# Patient Record
Sex: FEMALE | Race: WHITE | HISPANIC OR LATINO | Employment: OTHER | URBAN - METROPOLITAN AREA
[De-identification: names, ages, dates, MRNs, and addresses within clinical notes are randomized per-mention and may not be internally consistent; named-entity substitution may affect disease eponyms.]

---

## 2023-02-10 PROBLEM — R26.89 DECREASED MOBILITY: Status: ACTIVE | Noted: 2023-02-10

## 2023-02-10 PROBLEM — M54.50 LOWER BACK PAIN: Status: ACTIVE | Noted: 2023-02-10

## 2023-02-10 PROBLEM — M79.605 LEFT LEG PAIN: Status: ACTIVE | Noted: 2023-02-10

## 2023-02-10 PROBLEM — E78.00 HYPERCHOLESTEREMIA: Status: ACTIVE | Noted: 2023-02-10

## 2023-02-10 PROBLEM — L03.90 CELLULITIS: Status: ACTIVE | Noted: 2023-02-10

## 2023-02-10 PROBLEM — H52.03 HYPERMETROPIA OF BOTH EYES: Status: ACTIVE | Noted: 2023-02-10

## 2023-02-10 PROBLEM — N63.10 BREAST MASS, RIGHT: Status: ACTIVE | Noted: 2023-02-10

## 2023-02-10 PROBLEM — N18.30 CKD (CHRONIC KIDNEY DISEASE), STAGE III (MULTI): Status: ACTIVE | Noted: 2023-02-10

## 2023-02-10 PROBLEM — D69.6 THROMBOCYTOPENIA (CMS-HCC): Status: ACTIVE | Noted: 2023-02-10

## 2023-02-10 PROBLEM — E66.01 OBESITY, CLASS III, BMI 40-49.9 (MORBID OBESITY) (MULTI): Status: ACTIVE | Noted: 2023-02-10

## 2023-02-10 PROBLEM — M20.12 HAV (HALLUX ABDUCTO VALGUS), LEFT: Status: ACTIVE | Noted: 2023-02-10

## 2023-02-10 PROBLEM — H52.203 ASTIGMATISM, BILATERAL: Status: ACTIVE | Noted: 2023-02-10

## 2023-02-10 PROBLEM — I83.11 VARICOSE VEINS OF BOTH LOWER EXTREMITIES WITH INFLAMMATION: Status: ACTIVE | Noted: 2023-02-10

## 2023-02-10 PROBLEM — E11.9 DM2 (DIABETES MELLITUS, TYPE 2) (MULTI): Status: ACTIVE | Noted: 2023-02-10

## 2023-02-10 PROBLEM — J30.2 SEASONAL ALLERGIES: Status: ACTIVE | Noted: 2023-02-10

## 2023-02-10 PROBLEM — I87.303 CHRONIC VENOUS HYPERTENSION INVOLVING BOTH SIDES: Status: ACTIVE | Noted: 2023-02-10

## 2023-02-10 PROBLEM — J44.9 COPD (CHRONIC OBSTRUCTIVE PULMONARY DISEASE) (MULTI): Status: ACTIVE | Noted: 2023-02-10

## 2023-02-10 PROBLEM — I83.12 VARICOSE VEINS OF BOTH LOWER EXTREMITIES WITH INFLAMMATION: Status: ACTIVE | Noted: 2023-02-10

## 2023-02-10 PROBLEM — M79.604 BILATERAL LEG PAIN: Status: ACTIVE | Noted: 2023-02-10

## 2023-02-10 PROBLEM — E27.8 LEFT ADRENAL MASS (MULTI): Status: ACTIVE | Noted: 2023-02-10

## 2023-02-10 PROBLEM — R13.10 DIFFICULTY SWALLOWING: Status: ACTIVE | Noted: 2023-02-10

## 2023-02-10 PROBLEM — C64.1 RENAL CELL CARCINOMA OF RIGHT KIDNEY (MULTI): Status: ACTIVE | Noted: 2023-02-10

## 2023-02-10 PROBLEM — F41.9 ANXIETY: Status: ACTIVE | Noted: 2023-02-10

## 2023-02-10 PROBLEM — L02.419 AXILLARY ABSCESS: Status: ACTIVE | Noted: 2023-02-10

## 2023-02-10 PROBLEM — I87.2 CHRONIC VENOUS STASIS DERMATITIS OF BOTH LOWER EXTREMITIES: Status: ACTIVE | Noted: 2023-02-10

## 2023-02-10 PROBLEM — H43.813 PVD (POSTERIOR VITREOUS DETACHMENT), BOTH EYES: Status: ACTIVE | Noted: 2023-02-10

## 2023-02-10 PROBLEM — N25.81 HYPERPARATHYROIDISM, SECONDARY (MULTI): Status: ACTIVE | Noted: 2023-02-10

## 2023-02-10 PROBLEM — K76.9 LIVER LESION: Status: ACTIVE | Noted: 2023-02-10

## 2023-02-10 PROBLEM — E11.41 DIABETIC MONONEUROPATHY ASSOCIATED WITH TYPE 2 DIABETES MELLITUS (MULTI): Status: ACTIVE | Noted: 2023-02-10

## 2023-02-10 PROBLEM — G89.29 CHRONIC NONINTRACTABLE HEADACHE: Status: ACTIVE | Noted: 2023-02-10

## 2023-02-10 PROBLEM — M79.672 FOOT PAIN, BILATERAL: Status: ACTIVE | Noted: 2023-02-10

## 2023-02-10 PROBLEM — L95.9 VASCULITIS OF SKIN: Status: ACTIVE | Noted: 2023-02-10

## 2023-02-10 PROBLEM — M25.532 LEFT WRIST PAIN: Status: ACTIVE | Noted: 2023-02-10

## 2023-02-10 PROBLEM — J45.909 ASTHMA (HHS-HCC): Status: ACTIVE | Noted: 2023-02-10

## 2023-02-10 PROBLEM — H91.90 HEARING LOSS: Status: ACTIVE | Noted: 2023-02-10

## 2023-02-10 PROBLEM — E03.9 HYPOTHYROIDISM, ADULT: Status: ACTIVE | Noted: 2023-02-10

## 2023-02-10 PROBLEM — L60.0 ONYCHOCRYPTOSIS: Status: ACTIVE | Noted: 2023-02-10

## 2023-02-10 PROBLEM — F13.20 BENZODIAZEPINE DEPENDENCE (MULTI): Status: ACTIVE | Noted: 2023-02-10

## 2023-02-10 PROBLEM — I87.8 CHRONIC VENOUS STASIS: Status: ACTIVE | Noted: 2023-02-10

## 2023-02-10 PROBLEM — R21 RASH: Status: ACTIVE | Noted: 2023-02-10

## 2023-02-10 PROBLEM — R54 FRAIL ELDERLY: Status: ACTIVE | Noted: 2023-02-10

## 2023-02-10 PROBLEM — H26.493 PCO (POSTERIOR CAPSULAR OPACIFICATION), BILATERAL: Status: ACTIVE | Noted: 2023-02-10

## 2023-02-10 PROBLEM — M25.562 LEFT KNEE PAIN: Status: ACTIVE | Noted: 2023-02-10

## 2023-02-10 PROBLEM — L03.317 CELLULITIS OF BUTTOCK: Status: ACTIVE | Noted: 2023-02-10

## 2023-02-10 PROBLEM — R51.9 CHRONIC NONINTRACTABLE HEADACHE: Status: ACTIVE | Noted: 2023-02-10

## 2023-02-10 PROBLEM — R06.02 SHORTNESS OF BREATH: Status: ACTIVE | Noted: 2023-02-10

## 2023-02-10 PROBLEM — H34.8120 CENTRAL RETINAL VEIN OCCLUSION WITH MACULAR EDEMA OF LEFT EYE (CMS-HCC): Status: ACTIVE | Noted: 2023-02-10

## 2023-02-10 PROBLEM — R60.0 BILATERAL EDEMA OF LOWER EXTREMITY: Status: ACTIVE | Noted: 2023-02-10

## 2023-02-10 PROBLEM — R23.4 BREAST SKIN CHANGES: Status: ACTIVE | Noted: 2023-02-10

## 2023-02-10 PROBLEM — I95.9 HYPOTENSION: Status: ACTIVE | Noted: 2023-02-10

## 2023-02-10 PROBLEM — E11.9 DIABETES MELLITUS (MULTI): Status: ACTIVE | Noted: 2023-02-10

## 2023-02-10 PROBLEM — E04.1 THYROID NODULE: Status: ACTIVE | Noted: 2023-02-10

## 2023-02-10 PROBLEM — G62.9 PERIPHERAL NEUROPATHY: Status: ACTIVE | Noted: 2023-02-10

## 2023-02-10 PROBLEM — I77.6 VASCULITIS (CMS-HCC): Status: ACTIVE | Noted: 2023-02-10

## 2023-02-10 PROBLEM — Z96.1 PSEUDOPHAKIA OF BOTH EYES: Status: ACTIVE | Noted: 2023-02-10

## 2023-02-10 PROBLEM — K21.9 GERD (GASTROESOPHAGEAL REFLUX DISEASE): Status: ACTIVE | Noted: 2023-02-10

## 2023-02-10 PROBLEM — H52.4 BILATERAL PRESBYOPIA: Status: ACTIVE | Noted: 2023-02-10

## 2023-02-10 PROBLEM — C50.911 BREAST CANCER, RIGHT (MULTI): Status: ACTIVE | Noted: 2023-02-10

## 2023-02-10 PROBLEM — R23.3 PETECHIAL RASH: Status: ACTIVE | Noted: 2023-02-10

## 2023-02-10 PROBLEM — M79.671 FOOT PAIN, BILATERAL: Status: ACTIVE | Noted: 2023-02-10

## 2023-02-10 PROBLEM — M17.9 OSTEOARTHRITIS OF KNEE: Status: ACTIVE | Noted: 2023-02-10

## 2023-02-10 PROBLEM — C64.1: Status: ACTIVE | Noted: 2023-02-10

## 2023-02-10 PROBLEM — M79.605 BILATERAL LEG PAIN: Status: ACTIVE | Noted: 2023-02-10

## 2023-02-10 PROBLEM — L92.9 HYPERGRANULATION: Status: ACTIVE | Noted: 2023-02-10

## 2023-02-10 PROBLEM — M79.89 SWELLING OF LOWER LEG: Status: ACTIVE | Noted: 2023-02-10

## 2023-02-10 PROBLEM — L89.90 PRESSURE ULCER: Status: ACTIVE | Noted: 2023-02-10

## 2023-02-10 RX ORDER — LIDOCAINE 560 MG/1
PATCH PERCUTANEOUS; TOPICAL; TRANSDERMAL
COMMUNITY
Start: 2022-01-11 | End: 2023-04-11 | Stop reason: ALTCHOICE

## 2023-02-10 RX ORDER — CALCIUM CITRATE/VITAMIN D3 200MG-6.25
TABLET ORAL 3 TIMES DAILY
COMMUNITY
Start: 2018-03-20

## 2023-02-10 RX ORDER — LIDOCAINE 560 MG/1
1 PATCH PERCUTANEOUS; TOPICAL; TRANSDERMAL
COMMUNITY
Start: 2017-11-13

## 2023-02-10 RX ORDER — CHOLECALCIFEROL (VITAMIN D3) 25 MCG
1 TABLET ORAL DAILY
COMMUNITY
Start: 2019-03-29 | End: 2023-04-11 | Stop reason: SDUPTHER

## 2023-02-10 RX ORDER — PEN NEEDLE, DIABETIC 29 G X1/2"
NEEDLE, DISPOSABLE MISCELLANEOUS
COMMUNITY
Start: 2017-12-01 | End: 2023-11-30 | Stop reason: WASHOUT

## 2023-02-10 RX ORDER — METHADONE HYDROCHLORIDE 40 MG/1
2 TABLET ORAL DAILY
COMMUNITY
Start: 2018-10-31 | End: 2024-05-13 | Stop reason: ENTERED-IN-ERROR

## 2023-02-10 RX ORDER — ALBUTEROL SULFATE 0.63 MG/3ML
SOLUTION RESPIRATORY (INHALATION)
COMMUNITY
Start: 2018-09-10 | End: 2024-05-13 | Stop reason: ENTERED-IN-ERROR

## 2023-02-10 RX ORDER — LORATADINE 10 MG/1
1 TABLET ORAL DAILY PRN
COMMUNITY
Start: 2019-07-26 | End: 2023-10-16

## 2023-02-10 RX ORDER — FLUTICASONE PROPIONATE 110 UG/1
2 AEROSOL, METERED RESPIRATORY (INHALATION)
COMMUNITY
Start: 2021-08-16 | End: 2023-09-13 | Stop reason: SDUPTHER

## 2023-02-10 RX ORDER — LIDOCAINE 40 MG/G
CREAM TOPICAL
COMMUNITY
Start: 2018-11-09 | End: 2023-04-11 | Stop reason: SDUPTHER

## 2023-02-10 RX ORDER — INSULIN HUMAN 100 [IU]/ML
25 INJECTION, SUSPENSION SUBCUTANEOUS
COMMUNITY
Start: 2017-11-14 | End: 2024-05-13 | Stop reason: ENTERED-IN-ERROR

## 2023-02-10 RX ORDER — ACETAMINOPHEN 500 MG
1000 TABLET ORAL EVERY 6 HOURS
COMMUNITY
Start: 2021-07-30 | End: 2023-05-16 | Stop reason: SDUPTHER

## 2023-02-10 RX ORDER — LEVOTHYROXINE SODIUM 25 UG/1
1 TABLET ORAL
COMMUNITY
Start: 2017-12-15 | End: 2023-04-11 | Stop reason: SDUPTHER

## 2023-02-10 RX ORDER — HYDROXYZINE PAMOATE 25 MG/1
1-2 CAPSULE ORAL 2 TIMES DAILY PRN
COMMUNITY
Start: 2021-03-05 | End: 2023-04-11 | Stop reason: ALTCHOICE

## 2023-02-10 RX ORDER — BLOOD-GLUCOSE CONTROL, NORMAL
EACH MISCELLANEOUS
COMMUNITY
Start: 2017-12-01

## 2023-02-10 RX ORDER — ASPIRIN 81 MG/1
1 TABLET ORAL DAILY
COMMUNITY
Start: 2017-11-13 | End: 2023-04-11 | Stop reason: SDUPTHER

## 2023-02-10 RX ORDER — CAMPHOR
SPIRIT TOPICAL
COMMUNITY
Start: 2022-07-15 | End: 2024-05-13 | Stop reason: ENTERED-IN-ERROR

## 2023-02-10 RX ORDER — GABAPENTIN 300 MG/1
300 CAPSULE ORAL NIGHTLY
COMMUNITY
End: 2023-04-17 | Stop reason: SDUPTHER

## 2023-02-10 RX ORDER — CALCITRIOL 0.25 UG/1
0.25 CAPSULE ORAL
COMMUNITY
Start: 2022-06-09 | End: 2023-04-11 | Stop reason: ALTCHOICE

## 2023-03-07 ENCOUNTER — OFFICE VISIT (OUTPATIENT)
Dept: PRIMARY CARE | Facility: CLINIC | Age: 73
End: 2023-03-07
Payer: COMMERCIAL

## 2023-03-07 VITALS
OXYGEN SATURATION: 92 % | HEART RATE: 71 BPM | TEMPERATURE: 97.7 F | SYSTOLIC BLOOD PRESSURE: 111 MMHG | HEIGHT: 63 IN | DIASTOLIC BLOOD PRESSURE: 71 MMHG | BODY MASS INDEX: 33.66 KG/M2 | WEIGHT: 190 LBS

## 2023-03-07 DIAGNOSIS — G62.9 NEUROPATHY: Primary | ICD-10-CM

## 2023-03-07 LAB
ALANINE AMINOTRANSFERASE (SGPT) (U/L) IN SER/PLAS: 8 U/L (ref 7–45)
ALBUMIN (G/DL) IN SER/PLAS: 3.5 G/DL (ref 3.4–5)
ALKALINE PHOSPHATASE (U/L) IN SER/PLAS: 108 U/L (ref 33–136)
ANION GAP IN SER/PLAS: 10 MMOL/L (ref 10–20)
ASPARTATE AMINOTRANSFERASE (SGOT) (U/L) IN SER/PLAS: 19 U/L (ref 9–39)
BILIRUBIN TOTAL (MG/DL) IN SER/PLAS: 0.4 MG/DL (ref 0–1.2)
CALCIUM (MG/DL) IN SER/PLAS: 9.3 MG/DL (ref 8.6–10.6)
CARBON DIOXIDE, TOTAL (MMOL/L) IN SER/PLAS: 31 MMOL/L (ref 21–32)
CHLORIDE (MMOL/L) IN SER/PLAS: 101 MMOL/L (ref 98–107)
CHOLESTEROL (MG/DL) IN SER/PLAS: 164 MG/DL (ref 0–199)
CHOLESTEROL IN HDL (MG/DL) IN SER/PLAS: 38.6 MG/DL
CHOLESTEROL/HDL RATIO: 4.2
CREATININE (MG/DL) IN SER/PLAS: 1.32 MG/DL (ref 0.5–1.05)
ESTIMATED AVERAGE GLUCOSE FOR HBA1C: 183 MG/DL
GFR FEMALE: 43 ML/MIN/1.73M2
GLUCOSE (MG/DL) IN SER/PLAS: 193 MG/DL (ref 74–99)
HEMOGLOBIN A1C/HEMOGLOBIN TOTAL IN BLOOD: 8 %
NON-HDL CHOLESTEROL: 125 MG/DL
POTASSIUM (MMOL/L) IN SER/PLAS: 4.8 MMOL/L (ref 3.5–5.3)
PROTEIN TOTAL: 7.1 G/DL (ref 6.4–8.2)
SODIUM (MMOL/L) IN SER/PLAS: 137 MMOL/L (ref 136–145)
UREA NITROGEN (MG/DL) IN SER/PLAS: 23 MG/DL (ref 6–23)

## 2023-03-07 PROCEDURE — 1036F TOBACCO NON-USER: CPT | Performed by: STUDENT IN AN ORGANIZED HEALTH CARE EDUCATION/TRAINING PROGRAM

## 2023-03-07 PROCEDURE — 3078F DIAST BP <80 MM HG: CPT | Performed by: STUDENT IN AN ORGANIZED HEALTH CARE EDUCATION/TRAINING PROGRAM

## 2023-03-07 PROCEDURE — 99214 OFFICE O/P EST MOD 30 MIN: CPT | Performed by: STUDENT IN AN ORGANIZED HEALTH CARE EDUCATION/TRAINING PROGRAM

## 2023-03-07 PROCEDURE — 3074F SYST BP LT 130 MM HG: CPT | Performed by: STUDENT IN AN ORGANIZED HEALTH CARE EDUCATION/TRAINING PROGRAM

## 2023-03-07 PROCEDURE — 3052F HG A1C>EQUAL 8.0%<EQUAL 9.0%: CPT | Performed by: STUDENT IN AN ORGANIZED HEALTH CARE EDUCATION/TRAINING PROGRAM

## 2023-03-07 RX ORDER — CAPSAICIN 0.03 G/100G
CREAM TOPICAL 2 TIMES DAILY
Qty: 56.6 G | Refills: 3 | Status: SHIPPED | OUTPATIENT
Start: 2023-03-07 | End: 2024-03-06

## 2023-03-07 ASSESSMENT — PAIN SCALES - GENERAL: PAINLEVEL: 0-NO PAIN

## 2023-03-07 NOTE — PROGRESS NOTES
Subjective   Patient ID: Sara Santiago is a 72 y.o. female who presents for Follow-up (Numb fingers).  HPI  Mrs Santiago is a 71 year old female with PMHx: asthma, insulin-dependent diabetes, diabetic neuropathy substance abuse on methadone, renal cell carcinoma status post right partial nephrectomy in 2018, hypertension, hep C and hypothyroidism, T1c N1a M0, stage IIA invasive ductal carcinoma of the right breast s/p right partial mastectomy with sentinel lymph node biopsy. presenting for follow-up of DM and arm pain:    - Feels tired all the time for about 1.5 month now since decreased Gabapentin due to worsening renal function  - Never had EMG done for arm as recommended  - /71 in office  - Sugar numbers 190 last night, this AM was 120; one time hit 75. BG averaging in 200s. As high as 270s  - When it gets down to 90s, gets sweating  - Only taking insulin NPH like she wants: If glucose is 200s, takes 20U; if highter than than, will take 25U. If glucose is less than 150, won't take insulin at all.    Review of Systems   Constitutional:  Negative for chills and fever.   HENT:  Negative for congestion.    Eyes:  Negative for pain.   Respiratory:  Negative for cough, chest tightness, shortness of breath and wheezing.    Cardiovascular:  Negative for chest pain, palpitations and leg swelling.   Gastrointestinal:  Negative for abdominal distention, abdominal pain, constipation, diarrhea, nausea and vomiting.   Genitourinary:  Negative for dysuria.   Musculoskeletal:  Positive for arthralgias, back pain, joint swelling and myalgias.   Skin:  Negative for color change and rash.   Neurological:  Positive for weakness. Negative for numbness and headaches.   Psychiatric/Behavioral:  Negative for behavioral problems.        Objective   Physical Exam  Constitutional:       Appearance: Normal appearance. She is normal weight.   HENT:      Head: Normocephalic and atraumatic.      Nose: Nose normal.   Eyes:      Extraocular  "Movements: Extraocular movements intact.      Conjunctiva/sclera: Conjunctivae normal.      Pupils: Pupils are equal, round, and reactive to light.   Cardiovascular:      Rate and Rhythm: Normal rate and regular rhythm.      Pulses: Normal pulses.      Heart sounds: Normal heart sounds.   Pulmonary:      Effort: Pulmonary effort is normal.      Breath sounds: Normal breath sounds.   Abdominal:      General: Abdomen is flat. Bowel sounds are normal.      Palpations: Abdomen is soft.   Musculoskeletal:         General: Swelling present. Normal range of motion.      Cervical back: Normal range of motion and neck supple.      Right lower leg: Edema present.      Left lower leg: Edema present.   Skin:     General: Skin is warm and dry.      Capillary Refill: Capillary refill takes less than 2 seconds.      Findings: Bruising present.   Neurological:      General: No focal deficit present.      Mental Status: She is alert and oriented to person, place, and time. Mental status is at baseline.   Psychiatric:         Mood and Affect: Mood normal.         Behavior: Behavior normal.       BP 96/57 (BP Location: Left arm, Patient Position: Sitting)   Pulse 71   Temp 36.5 °C (97.7 °F)   Ht 1.6 m (5' 3\")   SpO2 92%   BMI 36.15 kg/m²      Assessment/Plan   Problem List Items Addressed This Visit    None    Mrs Santiago is a 71 year old female with PMHx: asthma, insulin-dependent diabetes, diabetic neuropathy substance abuse on methadone, renal cell carcinoma status post right partial nephrectomy in 2018, hypertension, hep C and hypothyroidism, T1c N1a M0, stage IIA invasive ductal carcinoma of the right breast s/p right partial mastectomy with sentinel lymph node biopsy. presenting for follow-up of DM and arm pain:    #Right Hand/Arm Weakness  - Discussed that needs to complete EMG as we discussed last time  - Xray of forearm from last visit negative  - C/w Tylenol and Warm compresses as needed for pain  - Ordered Capsaicin " cream for pain PRN    #DM  #Peripheral Neuropathy  - A1c 10/2022 at 9.0%; repeat ordered today  - C/w Humulin 70/30 25U BID with meals  - C/w Gabapentin 100mg TID  - Ordered Capsaicin cream to help with Neurpathic pain  - C/w x3 daily BG checks  - C/w lifestyle modifications and less sweets    Patient seen and discussed with Dr. John Morton MD   Resident Physician, PGY3  Family and Preventive Medicine

## 2023-03-09 ASSESSMENT — ENCOUNTER SYMPTOMS
ABDOMINAL PAIN: 0
EYE PAIN: 0
VOMITING: 0
ABDOMINAL DISTENTION: 0
DYSURIA: 0
BACK PAIN: 1
CHEST TIGHTNESS: 0
CONSTIPATION: 0
CHILLS: 0
NAUSEA: 0
COUGH: 0
HEADACHES: 0
PALPITATIONS: 0
FEVER: 0
ARTHRALGIAS: 1
SHORTNESS OF BREATH: 0
JOINT SWELLING: 1
MYALGIAS: 1
NUMBNESS: 0
WHEEZING: 0
DIARRHEA: 0
WEAKNESS: 1
COLOR CHANGE: 0

## 2023-03-10 NOTE — PROGRESS NOTES
I saw and evaluated the patient, participating in the key portions of the service.  I reviewed the resident’s note.  I agree with the resident’s findings and plan.     Umair Lopez MD

## 2023-04-11 ENCOUNTER — OFFICE VISIT (OUTPATIENT)
Dept: PRIMARY CARE | Facility: CLINIC | Age: 73
End: 2023-04-11
Payer: COMMERCIAL

## 2023-04-11 VITALS
HEART RATE: 69 BPM | WEIGHT: 190.5 LBS | SYSTOLIC BLOOD PRESSURE: 126 MMHG | HEIGHT: 63 IN | DIASTOLIC BLOOD PRESSURE: 74 MMHG | TEMPERATURE: 97.7 F | OXYGEN SATURATION: 97 % | BODY MASS INDEX: 33.75 KG/M2

## 2023-04-11 DIAGNOSIS — G62.9 NEUROPATHY: ICD-10-CM

## 2023-04-11 DIAGNOSIS — G56.11 RIGHT MEDIAN NERVE NEUROPATHY: Primary | ICD-10-CM

## 2023-04-11 DIAGNOSIS — M65.342 TRIGGER FINGER, LEFT RING FINGER: ICD-10-CM

## 2023-04-11 DIAGNOSIS — G45.9 TIA (TRANSIENT ISCHEMIC ATTACK): ICD-10-CM

## 2023-04-11 DIAGNOSIS — E03.9 HYPOTHYROIDISM, UNSPECIFIED TYPE: ICD-10-CM

## 2023-04-11 PROCEDURE — 1159F MED LIST DOCD IN RCRD: CPT | Performed by: STUDENT IN AN ORGANIZED HEALTH CARE EDUCATION/TRAINING PROGRAM

## 2023-04-11 PROCEDURE — 1036F TOBACCO NON-USER: CPT | Performed by: STUDENT IN AN ORGANIZED HEALTH CARE EDUCATION/TRAINING PROGRAM

## 2023-04-11 PROCEDURE — 99213 OFFICE O/P EST LOW 20 MIN: CPT | Performed by: STUDENT IN AN ORGANIZED HEALTH CARE EDUCATION/TRAINING PROGRAM

## 2023-04-11 PROCEDURE — 3052F HG A1C>EQUAL 8.0%<EQUAL 9.0%: CPT | Performed by: STUDENT IN AN ORGANIZED HEALTH CARE EDUCATION/TRAINING PROGRAM

## 2023-04-11 PROCEDURE — 3074F SYST BP LT 130 MM HG: CPT | Performed by: STUDENT IN AN ORGANIZED HEALTH CARE EDUCATION/TRAINING PROGRAM

## 2023-04-11 PROCEDURE — 3078F DIAST BP <80 MM HG: CPT | Performed by: STUDENT IN AN ORGANIZED HEALTH CARE EDUCATION/TRAINING PROGRAM

## 2023-04-11 RX ORDER — LEVOTHYROXINE SODIUM 25 UG/1
25 TABLET ORAL
Qty: 90 TABLET | Refills: 3 | Status: SHIPPED | OUTPATIENT
Start: 2023-04-11 | End: 2023-11-30 | Stop reason: SDUPTHER

## 2023-04-11 RX ORDER — ASPIRIN 81 MG/1
81 TABLET ORAL DAILY
Qty: 90 TABLET | Refills: 3 | Status: SHIPPED | OUTPATIENT
Start: 2023-04-11 | End: 2024-02-29 | Stop reason: SDUPTHER

## 2023-04-11 RX ORDER — LIDOCAINE 40 MG/G
CREAM TOPICAL 2 TIMES DAILY PRN
Qty: 28 G | Refills: 3 | Status: SHIPPED | OUTPATIENT
Start: 2023-04-11

## 2023-04-11 RX ORDER — CHOLECALCIFEROL (VITAMIN D3) 25 MCG
25 TABLET ORAL DAILY
Qty: 90 TABLET | Refills: 3 | Status: SHIPPED | OUTPATIENT
Start: 2023-04-11 | End: 2024-01-24 | Stop reason: SDUPTHER

## 2023-04-11 ASSESSMENT — ENCOUNTER SYMPTOMS
SHORTNESS OF BREATH: 0
ABDOMINAL PAIN: 0
NUMBNESS: 0
HEADACHES: 0
MYALGIAS: 1
CHEST TIGHTNESS: 0
DYSURIA: 0
COUGH: 0
PALPITATIONS: 0
CONSTIPATION: 0
WEAKNESS: 1
ABDOMINAL DISTENTION: 0
CHILLS: 0
COLOR CHANGE: 0
VOMITING: 0
FEVER: 0
DIARRHEA: 0
WHEEZING: 0
JOINT SWELLING: 1
EYE PAIN: 0
ARTHRALGIAS: 1
BACK PAIN: 1
NAUSEA: 0

## 2023-04-11 ASSESSMENT — PAIN SCALES - GENERAL: PAINLEVEL: 8

## 2023-04-11 NOTE — PROGRESS NOTES
Subjective   Patient ID: Sara Santiago is a 72 y.o. female who presents for Follow-up (Med Refill).    HPI  Mrs Santiago is a 71 year old female with PMHx: asthma, insulin-dependent diabetes, diabetic neuropathy substance abuse on methadone, renal cell carcinoma status post right partial nephrectomy in 2018, hypertension, hep C and hypothyroidism, T1c N1a M0, stage IIA invasive ductal carcinoma of the right breast s/p right partial mastectomy with sentinel lymph node biopsy. presenting for follow-up of arm pain:    - Right median nerve neuropathy/moderately severe  - Feels tired all the time for about 1.5 month now since decreased Gabapentin due to worsening renal function  - EMG done for arm as recommended and found to have mod/severe neuropathy of right median nerve requiring intervention    Review of Systems   Constitutional:  Negative for chills and fever.   HENT:  Negative for congestion.    Eyes:  Negative for pain.   Respiratory:  Negative for cough, chest tightness, shortness of breath and wheezing.    Cardiovascular:  Negative for chest pain, palpitations and leg swelling.   Gastrointestinal:  Negative for abdominal distention, abdominal pain, constipation, diarrhea, nausea and vomiting.   Genitourinary:  Negative for dysuria.   Musculoskeletal:  Positive for arthralgias, back pain, joint swelling and myalgias.   Skin:  Negative for color change and rash.   Neurological:  Positive for weakness. Negative for numbness and headaches.   Psychiatric/Behavioral:  Negative for behavioral problems.        Objective   Physical Exam  Constitutional:       Appearance: Normal appearance. She is normal weight.   HENT:      Head: Normocephalic and atraumatic.      Nose: Nose normal.   Eyes:      Extraocular Movements: Extraocular movements intact.      Conjunctiva/sclera: Conjunctivae normal.      Pupils: Pupils are equal, round, and reactive to light.   Cardiovascular:      Rate and Rhythm: Normal rate and regular rhythm.  "     Pulses: Normal pulses.      Heart sounds: Normal heart sounds.   Pulmonary:      Effort: Pulmonary effort is normal.      Breath sounds: Normal breath sounds.   Abdominal:      General: Abdomen is flat. Bowel sounds are normal.      Palpations: Abdomen is soft.   Musculoskeletal:         General: Swelling present. Normal range of motion.      Cervical back: Normal range of motion and neck supple.      Right lower leg: Edema present.      Left lower leg: Edema present.   Skin:     General: Skin is warm and dry.      Capillary Refill: Capillary refill takes less than 2 seconds.      Findings: Bruising present.   Neurological:      General: No focal deficit present.      Mental Status: She is alert and oriented to person, place, and time. Mental status is at baseline.   Psychiatric:         Mood and Affect: Mood normal.         Behavior: Behavior normal.       /74 (BP Location: Left arm, Patient Position: Sitting)   Pulse 69   Temp 36.5 °C (97.7 °F)   Ht 1.588 m (5' 2.5\")   Wt 86.4 kg (190 lb 8 oz)   SpO2 97%   BMI 34.29 kg/m²      Assessment/Plan   Problem List Items Addressed This Visit    None    Mrs Santiago is a 71 year old female with PMHx: asthma, insulin-dependent diabetes, diabetic neuropathy substance abuse on methadone, renal cell carcinoma status post right partial nephrectomy in 2018, hypertension, hep C and hypothyroidism, T1c N1a M0, stage IIA invasive ductal carcinoma of the right breast s/p right partial mastectomy with sentinel lymph node biopsy. presenting for follow-up of arm pain:    #Right Median Nerve Neuropathy  - EMG found to have mod to severe right median nerve neuropathy  - C/w Tylenol and Warm compresses as needed for pain  - C/w Capsaicin cream for pain PRN  - Ordered wrist splint  - Referral placed to Ortho [facility]    #Peripheral Neuropathy  - Refilled Gabapentin 100mg TID  - Refilled Lidocaine 4% cream    #Hypothyroidism  - Ordered TSH with free T4  - Refill Synthroid 25mcg " PO daily    Plan to follow-up in 1 month    Patient discussed with Dr. Veronica Morton MD   Resident Physician, PGY3  Family and Preventive Medicine

## 2023-04-14 DIAGNOSIS — K21.9 GASTRO-ESOPHAGEAL REFLUX DISEASE WITHOUT ESOPHAGITIS: ICD-10-CM

## 2023-04-17 RX ORDER — GABAPENTIN 300 MG/1
300 CAPSULE ORAL NIGHTLY
Qty: 90 CAPSULE | Refills: 3 | Status: SHIPPED | OUTPATIENT
Start: 2023-04-17 | End: 2023-07-24 | Stop reason: SDUPTHER

## 2023-04-17 RX ORDER — FAMOTIDINE 10 MG/1
TABLET ORAL
Qty: 30 TABLET | Refills: 11 | Status: SHIPPED | OUTPATIENT
Start: 2023-04-17 | End: 2023-11-01 | Stop reason: ALTCHOICE

## 2023-04-17 NOTE — PROGRESS NOTES
I reviewed with the resident the medical history and the resident’s findings on physical examination.  I discussed with the resident the patient’s diagnosis and concur with the treatment plan as documented in the resident note.     Mohamud Martin MD

## 2023-05-12 ENCOUNTER — LAB (OUTPATIENT)
Dept: LAB | Facility: LAB | Age: 73
End: 2023-05-12
Payer: COMMERCIAL

## 2023-05-12 DIAGNOSIS — E03.9 HYPOTHYROIDISM, UNSPECIFIED TYPE: ICD-10-CM

## 2023-05-12 LAB
ALANINE AMINOTRANSFERASE (SGPT) (U/L) IN SER/PLAS: 9 U/L (ref 7–45)
ALBUMIN (G/DL) IN SER/PLAS: 3.7 G/DL (ref 3.4–5)
ALKALINE PHOSPHATASE (U/L) IN SER/PLAS: 98 U/L (ref 33–136)
ANION GAP IN SER/PLAS: 13 MMOL/L (ref 10–20)
ASPARTATE AMINOTRANSFERASE (SGOT) (U/L) IN SER/PLAS: 15 U/L (ref 9–39)
BASOPHILS (10*3/UL) IN BLOOD BY AUTOMATED COUNT: 0.02 X10E9/L (ref 0–0.1)
BASOPHILS/100 LEUKOCYTES IN BLOOD BY AUTOMATED COUNT: 0.3 % (ref 0–2)
BILIRUBIN TOTAL (MG/DL) IN SER/PLAS: 1.1 MG/DL (ref 0–1.2)
CALCIUM (MG/DL) IN SER/PLAS: 9.6 MG/DL (ref 8.6–10.6)
CARBON DIOXIDE, TOTAL (MMOL/L) IN SER/PLAS: 30 MMOL/L (ref 21–32)
CHLORIDE (MMOL/L) IN SER/PLAS: 100 MMOL/L (ref 98–107)
CREATININE (MG/DL) IN SER/PLAS: 1.38 MG/DL (ref 0.5–1.05)
EOSINOPHILS (10*3/UL) IN BLOOD BY AUTOMATED COUNT: 0.11 X10E9/L (ref 0–0.4)
EOSINOPHILS/100 LEUKOCYTES IN BLOOD BY AUTOMATED COUNT: 1.7 % (ref 0–6)
ERYTHROCYTE DISTRIBUTION WIDTH (RATIO) BY AUTOMATED COUNT: 13.6 % (ref 11.5–14.5)
ERYTHROCYTE MEAN CORPUSCULAR HEMOGLOBIN CONCENTRATION (G/DL) BY AUTOMATED: 32.4 G/DL (ref 32–36)
ERYTHROCYTE MEAN CORPUSCULAR VOLUME (FL) BY AUTOMATED COUNT: 91 FL (ref 80–100)
ERYTHROCYTES (10*6/UL) IN BLOOD BY AUTOMATED COUNT: 4.32 X10E12/L (ref 4–5.2)
GFR FEMALE: 40 ML/MIN/1.73M2
GLUCOSE (MG/DL) IN SER/PLAS: 226 MG/DL (ref 74–99)
HEMATOCRIT (%) IN BLOOD BY AUTOMATED COUNT: 39.5 % (ref 36–46)
HEMOGLOBIN (G/DL) IN BLOOD: 12.8 G/DL (ref 12–16)
HEPATITIS A VIRUS IGM AB PRESENCE IN SER/PLAS BY IMMUNOASSAY: NONREACTIVE
HEPATITIS B VIRUS SURFACE AB (MIU/ML) IN SERUM: 4.7 MIU/ML
HEPATITIS B VIRUS SURFACE AG PRESENCE IN SERUM: NONREACTIVE
HEPATITIS C VIRUS AB PRESENCE IN SERUM: REACTIVE
HIV 1/ 2 AG/AB SCREEN: NONREACTIVE
IMMATURE GRANULOCYTES/100 LEUKOCYTES IN BLOOD BY AUTOMATED COUNT: 0.2 % (ref 0–0.9)
LEUKOCYTES (10*3/UL) IN BLOOD BY AUTOMATED COUNT: 6.4 X10E9/L (ref 4.4–11.3)
LYMPHOCYTES (10*3/UL) IN BLOOD BY AUTOMATED COUNT: 0.78 X10E9/L (ref 0.8–3)
LYMPHOCYTES/100 LEUKOCYTES IN BLOOD BY AUTOMATED COUNT: 12.2 % (ref 13–44)
MONOCYTES (10*3/UL) IN BLOOD BY AUTOMATED COUNT: 0.38 X10E9/L (ref 0.05–0.8)
MONOCYTES/100 LEUKOCYTES IN BLOOD BY AUTOMATED COUNT: 6 % (ref 2–10)
NEUTROPHILS (10*3/UL) IN BLOOD BY AUTOMATED COUNT: 5.08 X10E9/L (ref 1.6–5.5)
NEUTROPHILS/100 LEUKOCYTES IN BLOOD BY AUTOMATED COUNT: 79.6 % (ref 40–80)
NRBC (PER 100 WBCS) BY AUTOMATED COUNT: 0 /100 WBC (ref 0–0)
PLATELETS (10*3/UL) IN BLOOD AUTOMATED COUNT: 109 X10E9/L (ref 150–450)
POTASSIUM (MMOL/L) IN SER/PLAS: 5 MMOL/L (ref 3.5–5.3)
PROTEIN TOTAL: 7.4 G/DL (ref 6.4–8.2)
SODIUM (MMOL/L) IN SER/PLAS: 138 MMOL/L (ref 136–145)
THYROTROPIN (MIU/L) IN SER/PLAS BY DETECTION LIMIT <= 0.05 MIU/L: 3.17 MIU/L (ref 0.44–3.98)
UREA NITROGEN (MG/DL) IN SER/PLAS: 26 MG/DL (ref 6–23)

## 2023-05-12 PROCEDURE — 84443 ASSAY THYROID STIM HORMONE: CPT

## 2023-05-12 PROCEDURE — 36415 COLL VENOUS BLD VENIPUNCTURE: CPT

## 2023-05-13 LAB
RPR TITER: ABNORMAL
RPR: REACTIVE
SYPHILIS TOTAL AB: REACTIVE

## 2023-05-14 LAB
HCV PCR QUANT: NOT DETECTED IU/ML
HCV RNA, PCR LOG: NORMAL LOG10 IU/ML

## 2023-05-16 ENCOUNTER — OFFICE VISIT (OUTPATIENT)
Dept: PRIMARY CARE | Facility: CLINIC | Age: 73
End: 2023-05-16
Payer: COMMERCIAL

## 2023-05-16 VITALS
WEIGHT: 184 LBS | HEART RATE: 72 BPM | SYSTOLIC BLOOD PRESSURE: 104 MMHG | TEMPERATURE: 97.1 F | DIASTOLIC BLOOD PRESSURE: 57 MMHG | BODY MASS INDEX: 32.6 KG/M2 | OXYGEN SATURATION: 93 % | HEIGHT: 63 IN

## 2023-05-16 DIAGNOSIS — M65.342 TRIGGER RING FINGER OF LEFT HAND: ICD-10-CM

## 2023-05-16 DIAGNOSIS — G56.01 CARPAL TUNNEL SYNDROME OF RIGHT WRIST: ICD-10-CM

## 2023-05-16 DIAGNOSIS — Z00.00 HEALTH CARE MAINTENANCE: Primary | ICD-10-CM

## 2023-05-16 PROCEDURE — 1159F MED LIST DOCD IN RCRD: CPT | Performed by: STUDENT IN AN ORGANIZED HEALTH CARE EDUCATION/TRAINING PROGRAM

## 2023-05-16 PROCEDURE — 3052F HG A1C>EQUAL 8.0%<EQUAL 9.0%: CPT | Performed by: STUDENT IN AN ORGANIZED HEALTH CARE EDUCATION/TRAINING PROGRAM

## 2023-05-16 PROCEDURE — 1036F TOBACCO NON-USER: CPT | Performed by: STUDENT IN AN ORGANIZED HEALTH CARE EDUCATION/TRAINING PROGRAM

## 2023-05-16 PROCEDURE — 3074F SYST BP LT 130 MM HG: CPT | Performed by: STUDENT IN AN ORGANIZED HEALTH CARE EDUCATION/TRAINING PROGRAM

## 2023-05-16 PROCEDURE — 3078F DIAST BP <80 MM HG: CPT | Performed by: STUDENT IN AN ORGANIZED HEALTH CARE EDUCATION/TRAINING PROGRAM

## 2023-05-16 PROCEDURE — 99213 OFFICE O/P EST LOW 20 MIN: CPT | Performed by: STUDENT IN AN ORGANIZED HEALTH CARE EDUCATION/TRAINING PROGRAM

## 2023-05-16 RX ORDER — MULTIVITAMIN
1 TABLET ORAL DAILY
Qty: 90 TABLET | Refills: 3 | Status: SHIPPED | OUTPATIENT
Start: 2023-05-16

## 2023-05-16 RX ORDER — ACETAMINOPHEN 500 MG
1000 TABLET ORAL EVERY 6 HOURS
Qty: 180 TABLET | Refills: 3 | Status: SHIPPED | OUTPATIENT
Start: 2023-05-16 | End: 2023-11-11 | Stop reason: SDUPTHER

## 2023-05-16 ASSESSMENT — PAIN SCALES - GENERAL: PAINLEVEL: 0-NO PAIN

## 2023-05-16 NOTE — PROGRESS NOTES
Subjective   Patient ID: Sara Santiago is a 72 y.o. female who presents for Follow-up.     HPI  Mrs Santiago is a 71 year old female with PMHx: asthma, insulin-dependent diabetes, diabetic neuropathy substance abuse on methadone, renal cell carcinoma status post right partial nephrectomy in 2018, hypertension, hep C and hypothyroidism, T1c N1a M0, stage IIA invasive ductal carcinoma of the right breast s/p right partial mastectomy with sentinel lymph node biopsy. presenting for follow-up of arm pain:    - Right median nerve neuropathy/moderately severe  - Feels tired all the time for about 3 month now since decreased Gabapentin due to worsening renal function  - EMG done for arm as recommended and found to have mod/severe neuropathy of right median nerve requiring intervention  - Still has not made appointment with Ortho [facility] or started Occupational therapy    Review of Systems   Constitutional:  Negative for chills and fever.   HENT:  Negative for congestion.    Eyes:  Negative for pain.   Respiratory:  Negative for cough, chest tightness, shortness of breath and wheezing.    Cardiovascular:  Negative for chest pain, palpitations and leg swelling.   Gastrointestinal:  Negative for abdominal distention, abdominal pain, constipation, diarrhea, nausea and vomiting.   Genitourinary:  Negative for dysuria.   Musculoskeletal:  Positive for arthralgias, back pain, joint swelling and myalgias.   Skin:  Negative for color change and rash.   Neurological:  Positive for weakness. Negative for numbness and headaches.   Psychiatric/Behavioral:  Negative for behavioral problems.        Objective   Physical Exam  Constitutional:       Appearance: Normal appearance. She is normal weight.   HENT:      Head: Normocephalic and atraumatic.      Nose: Nose normal.   Eyes:      Extraocular Movements: Extraocular movements intact.      Conjunctiva/sclera: Conjunctivae normal.      Pupils: Pupils are equal, round, and reactive to light.  "  Cardiovascular:      Rate and Rhythm: Normal rate and regular rhythm.      Pulses: Normal pulses.      Heart sounds: Normal heart sounds.   Pulmonary:      Effort: Pulmonary effort is normal.      Breath sounds: Normal breath sounds.   Abdominal:      General: Abdomen is flat. Bowel sounds are normal.      Palpations: Abdomen is soft.   Musculoskeletal:         General: Swelling present. Normal range of motion.      Cervical back: Normal range of motion and neck supple.      Right lower leg: Edema present.      Left lower leg: Edema present.   Skin:     General: Skin is warm and dry.      Capillary Refill: Capillary refill takes less than 2 seconds.      Findings: Bruising present.   Neurological:      General: No focal deficit present.      Mental Status: She is alert and oriented to person, place, and time. Mental status is at baseline.   Psychiatric:         Mood and Affect: Mood normal.         Behavior: Behavior normal.       /57 (BP Location: Left arm, Patient Position: Sitting)   Pulse 72   Temp 36.2 °C (97.1 °F)   Ht 1.6 m (5' 3\")   Wt 83.5 kg (184 lb)   SpO2 93%   BMI 32.59 kg/m²      Assessment/Plan   Problem List Items Addressed This Visit    None  Visit Diagnoses       Health care maintenance    -  Primary    Relevant Medications    multivitamin tablet    Trigger ring finger of left hand        Relevant Medications    acetaminophen (Tylenol) 500 mg tablet    Other Relevant Orders    Referral to Orthopaedic Surgery    Referral to Occupational Therapy    Carpal tunnel syndrome of right wrist        Relevant Medications    acetaminophen (Tylenol) 500 mg tablet    Other Relevant Orders    Referral to Orthopaedic Surgery    Referral to Occupational Therapy            Mrs Santiago is a 71 year old female with PMHx: asthma, insulin-dependent diabetes, diabetic neuropathy substance abuse on methadone, renal cell carcinoma status post right partial nephrectomy in 2018, hypertension, hep C and " hypothyroidism, T1c N1a M0, stage IIA invasive ductal carcinoma of the right breast s/p right partial mastectomy with sentinel lymph node biopsy. presenting for follow-up of arm pain:    #Right Median Nerve Neuropathy  #Trigger Finger of Left Hand  - EMG found to have mod to severe right median nerve neuropathy  - C/w Tylenol and Warm compresses for pain  - C/w Capsaicin cream for pain PRN  - Refilled Tylenol 1000mg TID scheduled  - New referral placed to [facility]  - New referral placed for OT    Plan to follow-up in 1 month    Patient discussed with Dr. Veronica Morton MD   Resident Physician, PGY3  Family and Preventive Medicine

## 2023-05-19 ASSESSMENT — ENCOUNTER SYMPTOMS
NAUSEA: 0
SHORTNESS OF BREATH: 0
BACK PAIN: 1
JOINT SWELLING: 1
CHEST TIGHTNESS: 0
DYSURIA: 0
ABDOMINAL PAIN: 0
NUMBNESS: 0
VOMITING: 0
ARTHRALGIAS: 1
COLOR CHANGE: 0
PALPITATIONS: 0
HEADACHES: 0
ABDOMINAL DISTENTION: 0
CONSTIPATION: 0
COUGH: 0
DIARRHEA: 0
WHEEZING: 0
FEVER: 0
EYE PAIN: 0
MYALGIAS: 1
WEAKNESS: 1
CHILLS: 0

## 2023-05-24 DIAGNOSIS — E78.5 HYPERLIPIDEMIA, UNSPECIFIED HYPERLIPIDEMIA TYPE: Primary | ICD-10-CM

## 2023-05-24 RX ORDER — ATORVASTATIN CALCIUM 40 MG/1
40 TABLET, FILM COATED ORAL DAILY
Qty: 90 TABLET | Refills: 3 | Status: SHIPPED | OUTPATIENT
Start: 2023-05-24 | End: 2023-11-01 | Stop reason: ALTCHOICE

## 2023-06-16 ENCOUNTER — TELEPHONE (OUTPATIENT)
Dept: PRIMARY CARE | Facility: CLINIC | Age: 73
End: 2023-06-16
Payer: COMMERCIAL

## 2023-06-16 NOTE — TELEPHONE ENCOUNTER
Patient needs a letter for her electric wheelchair, hopefully done before her June 20th appointment

## 2023-06-20 ENCOUNTER — OFFICE VISIT (OUTPATIENT)
Dept: PRIMARY CARE | Facility: CLINIC | Age: 73
End: 2023-06-20
Payer: COMMERCIAL

## 2023-06-20 VITALS
HEART RATE: 71 BPM | BODY MASS INDEX: 33.65 KG/M2 | TEMPERATURE: 98 F | OXYGEN SATURATION: 95 % | HEIGHT: 62 IN | DIASTOLIC BLOOD PRESSURE: 75 MMHG | SYSTOLIC BLOOD PRESSURE: 123 MMHG

## 2023-06-20 DIAGNOSIS — G56.01 CARPAL TUNNEL SYNDROME OF RIGHT WRIST: ICD-10-CM

## 2023-06-20 DIAGNOSIS — M65.342 TRIGGER RING FINGER OF LEFT HAND: Primary | ICD-10-CM

## 2023-06-20 PROCEDURE — 3078F DIAST BP <80 MM HG: CPT | Performed by: STUDENT IN AN ORGANIZED HEALTH CARE EDUCATION/TRAINING PROGRAM

## 2023-06-20 PROCEDURE — 1036F TOBACCO NON-USER: CPT | Performed by: STUDENT IN AN ORGANIZED HEALTH CARE EDUCATION/TRAINING PROGRAM

## 2023-06-20 PROCEDURE — 99213 OFFICE O/P EST LOW 20 MIN: CPT | Performed by: STUDENT IN AN ORGANIZED HEALTH CARE EDUCATION/TRAINING PROGRAM

## 2023-06-20 PROCEDURE — 3052F HG A1C>EQUAL 8.0%<EQUAL 9.0%: CPT | Performed by: STUDENT IN AN ORGANIZED HEALTH CARE EDUCATION/TRAINING PROGRAM

## 2023-06-20 PROCEDURE — 3074F SYST BP LT 130 MM HG: CPT | Performed by: STUDENT IN AN ORGANIZED HEALTH CARE EDUCATION/TRAINING PROGRAM

## 2023-06-20 PROCEDURE — 1159F MED LIST DOCD IN RCRD: CPT | Performed by: STUDENT IN AN ORGANIZED HEALTH CARE EDUCATION/TRAINING PROGRAM

## 2023-06-20 RX ORDER — FLASH GLUCOSE SCANNING READER
EACH MISCELLANEOUS
COMMUNITY
Start: 2022-11-08 | End: 2024-02-29 | Stop reason: WASHOUT

## 2023-06-20 RX ORDER — FLASH GLUCOSE SENSOR
KIT MISCELLANEOUS
COMMUNITY
Start: 2022-11-08 | End: 2024-02-29 | Stop reason: WASHOUT

## 2023-06-20 RX ORDER — LANCETS 33 GAUGE
EACH MISCELLANEOUS
COMMUNITY
Start: 2022-07-28 | End: 2023-10-19 | Stop reason: SDUPTHER

## 2023-06-20 RX ORDER — GABAPENTIN 800 MG/1
TABLET ORAL EVERY 8 HOURS
COMMUNITY
Start: 2021-10-01 | End: 2023-07-24 | Stop reason: SDUPTHER

## 2023-06-20 ASSESSMENT — PAIN SCALES - GENERAL: PAINLEVEL: 5

## 2023-06-20 NOTE — PROGRESS NOTES
Subjective   Patient ID: Sara Santiago is a 73 y.o. female who presents for Follow-up.     HPI  Mrs Santiago is a 73 year old female with PMHx: asthma, insulin-dependent diabetes, diabetic neuropathy substance abuse on methadone, renal cell carcinoma status post right partial nephrectomy in 2018, hypertension, hep C and hypothyroidism, T1c N1a M0, stage IIA invasive ductal carcinoma of the right breast s/p right partial mastectomy with sentinel lymph node biopsy. presenting for follow-up of arm pain and finger pain:    - Went through the wall in the Methadone clinic with wheelchair; it is going to fast  - Trigger finger: referral to Ortho for trigger finger release with injections (left hand and median nerve neuropathy); Ortho appt. on 6/28    Review of Systems   Constitutional:  Negative for chills and fever.   HENT:  Negative for congestion.    Eyes:  Negative for pain.   Respiratory:  Negative for cough, chest tightness, shortness of breath and wheezing.    Cardiovascular:  Negative for chest pain, palpitations and leg swelling.   Gastrointestinal:  Negative for abdominal distention, abdominal pain, constipation, diarrhea, nausea and vomiting.   Genitourinary:  Negative for dysuria.   Musculoskeletal:  Positive for arthralgias, back pain, joint swelling and myalgias.   Skin:  Negative for color change and rash.   Neurological:  Positive for weakness. Negative for numbness and headaches.   Psychiatric/Behavioral:  Negative for behavioral problems.        Objective   Physical Exam  Constitutional:       Appearance: Normal appearance. She is normal weight.   HENT:      Head: Normocephalic and atraumatic.      Nose: Nose normal.   Eyes:      Extraocular Movements: Extraocular movements intact.      Conjunctiva/sclera: Conjunctivae normal.      Pupils: Pupils are equal, round, and reactive to light.   Cardiovascular:      Rate and Rhythm: Normal rate and regular rhythm.      Pulses: Normal pulses.      Heart sounds:  "Normal heart sounds.   Pulmonary:      Effort: Pulmonary effort is normal.      Breath sounds: Normal breath sounds.   Abdominal:      General: Abdomen is flat. Bowel sounds are normal.      Palpations: Abdomen is soft.   Musculoskeletal:         General: Swelling present. Normal range of motion.      Cervical back: Normal range of motion and neck supple.      Right lower leg: Edema present.      Left lower leg: Edema present.   Skin:     General: Skin is warm and dry.      Capillary Refill: Capillary refill takes less than 2 seconds.      Findings: Bruising present.   Neurological:      General: No focal deficit present.      Mental Status: She is alert and oriented to person, place, and time. Mental status is at baseline.   Psychiatric:         Mood and Affect: Mood normal.         Behavior: Behavior normal.       /75 (BP Location: Right arm, Patient Position: Sitting, BP Cuff Size: Adult)   Pulse 71   Temp 36.7 °C (98 °F) (Temporal)   Ht 1.575 m (5' 2\")   SpO2 95%   BMI 33.65 kg/m²      Assessment/Plan   Problem List Items Addressed This Visit    None    Mrs Santiago is a 73 year old female with PMHx: asthma, insulin-dependent diabetes, diabetic neuropathy substance abuse on methadone, renal cell carcinoma status post right partial nephrectomy in 2018, hypertension, hep C and hypothyroidism, T1c N1a M0, stage IIA invasive ductal carcinoma of the right breast s/p right partial mastectomy with sentinel lymph node biopsy. presenting for follow-up of arm pain:    #Right Median Nerve Neuropathy  #Trigger Finger of Left and Right Hand  - EMG found to have mod to severe right median nerve neuropathy  - C/w Tylenol and Warm compresses for pain  - C/w Capsaicin cream for pain PRN  - C/w Tylenol 1000mg TID scheduled  - Go to Ortho [facility] Appt. On 6/28  - Schedule appointment with OT as previously discussed    #Malfunctioning Wheelchair  - Letter of support written for patient to receive new motorized wheelchair due " to malfunction    Plan to follow-up in 1 month    Patient discussed with Dr. John Morton MD   Resident Physician, PGY3  Family and Preventive Medicine

## 2023-06-26 ASSESSMENT — ENCOUNTER SYMPTOMS
VOMITING: 0
HEADACHES: 0
DIARRHEA: 0
COLOR CHANGE: 0
MYALGIAS: 1
PALPITATIONS: 0
SHORTNESS OF BREATH: 0
WEAKNESS: 1
JOINT SWELLING: 1
ARTHRALGIAS: 1
DYSURIA: 0
ABDOMINAL DISTENTION: 0
CHEST TIGHTNESS: 0
FEVER: 0
BACK PAIN: 1
NAUSEA: 0
CONSTIPATION: 0
CHILLS: 0
ABDOMINAL PAIN: 0
WHEEZING: 0
EYE PAIN: 0
COUGH: 0
NUMBNESS: 0

## 2023-06-27 NOTE — PROGRESS NOTES
I reviewed with the resident the medical history and the resident’s findings on physical examination.  I discussed with the resident the patient’s diagnosis and concur with the treatment plan as documented in the resident note.     Umair Lopez MD

## 2023-07-24 ENCOUNTER — LAB (OUTPATIENT)
Dept: LAB | Facility: LAB | Age: 73
End: 2023-07-24
Payer: COMMERCIAL

## 2023-07-24 ENCOUNTER — OFFICE VISIT (OUTPATIENT)
Dept: PRIMARY CARE | Facility: CLINIC | Age: 73
End: 2023-07-24
Payer: COMMERCIAL

## 2023-07-24 VITALS
TEMPERATURE: 97.5 F | DIASTOLIC BLOOD PRESSURE: 70 MMHG | BODY MASS INDEX: 34.35 KG/M2 | HEART RATE: 73 BPM | WEIGHT: 187.8 LBS | OXYGEN SATURATION: 94 % | SYSTOLIC BLOOD PRESSURE: 126 MMHG

## 2023-07-24 DIAGNOSIS — M79.605 BILATERAL LEG PAIN: Primary | ICD-10-CM

## 2023-07-24 DIAGNOSIS — M79.604 BILATERAL LEG PAIN: ICD-10-CM

## 2023-07-24 DIAGNOSIS — M79.605 BILATERAL LEG PAIN: ICD-10-CM

## 2023-07-24 DIAGNOSIS — M79.604 BILATERAL LEG PAIN: Primary | ICD-10-CM

## 2023-07-24 LAB — FERRITIN (UG/LL) IN SER/PLAS: 104 UG/L (ref 8–150)

## 2023-07-24 PROCEDURE — 99213 OFFICE O/P EST LOW 20 MIN: CPT

## 2023-07-24 PROCEDURE — 3052F HG A1C>EQUAL 8.0%<EQUAL 9.0%: CPT

## 2023-07-24 PROCEDURE — 1160F RVW MEDS BY RX/DR IN RCRD: CPT

## 2023-07-24 PROCEDURE — 36415 COLL VENOUS BLD VENIPUNCTURE: CPT

## 2023-07-24 PROCEDURE — 82728 ASSAY OF FERRITIN: CPT

## 2023-07-24 PROCEDURE — 1159F MED LIST DOCD IN RCRD: CPT

## 2023-07-24 PROCEDURE — 3078F DIAST BP <80 MM HG: CPT

## 2023-07-24 PROCEDURE — 1125F AMNT PAIN NOTED PAIN PRSNT: CPT

## 2023-07-24 PROCEDURE — 1036F TOBACCO NON-USER: CPT

## 2023-07-24 PROCEDURE — 3074F SYST BP LT 130 MM HG: CPT

## 2023-07-24 RX ORDER — GABAPENTIN 300 MG/1
900 CAPSULE ORAL NIGHTLY
Qty: 90 CAPSULE | Refills: 3 | Status: SHIPPED | OUTPATIENT
Start: 2023-07-24 | End: 2023-11-01

## 2023-07-26 NOTE — PROGRESS NOTES
Subjective   Patient ID: Sara is a 73 y.o. female with PMH of t2dm, chronic bilateral leg pain, and breast cancer who presents for Follow-up.    HPI  - has had worsening bilateral leg pain and tingling, worse at night  - in electric wheelchair, unable to ambulate or get out of chair   - was started on 800 mg gabapentin which she said completely resolved the pain, but was changed to 300 mg at night   - no issues with urination, pain similar to prior in terms of quality and quantity    Patient Active Problem List   Diagnosis    Diabetes mellitus (CMS/HCC)    Decreased mobility    Diabetic mononeuropathy associated with type 2 diabetes mellitus (CMS/HCC)    Difficulty swallowing    DM2 (diabetes mellitus, type 2) (CMS/HCC)    Type 2 DM with CKD and hypertension (CMS/HCC)    Cellulitis of buttock    Central retinal vein occlusion with macular edema of left eye    Chronic nonintractable headache    Chronic venous hypertension involving both sides    Chronic venous stasis    CKD (chronic kidney disease), stage III (CMS/HCC)    COPD (chronic obstructive pulmonary disease) (CMS/HCC)    Renal cell carcinoma of right kidney (CMS/HCC)    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)    Foot pain, bilateral    Left leg pain    Bilateral presbyopia    Breast cancer, right (CMS/HCC)    Breast mass, right    Breast skin changes    Cellulitis    Anxiety    Asthma    Astigmatism, bilateral    Axillary abscess    Benzodiazepine dependence (CMS/HCC)    Bilateral edema of lower extremity    Bilateral leg pain    Left adrenal mass (CMS/HCC)    Hypotension    Hyperparathyroidism, secondary (CMS/HCC)    Pseudophakia of both eyes    Hypermetropia of both eyes    Rash    Petechial rash    Hypergranulation    Frail elderly    GERD (gastroesophageal reflux disease)    Hav (hallux abducto valgus), left    Hearing loss    Hypercholesteremia    Left knee pain    Left wrist pain    Liver lesion    Lower back pain    Obesity, Class III, BMI  40-49.9 (morbid obesity) (CMS/[facility])    Onychocryptosis    Swelling of lower leg    Shortness of breath    Seasonal allergies    PVD (posterior vitreous detachment), both eyes    Pressure ulcer    Peripheral neuropathy    PCO (posterior capsular opacification), bilateral    Osteoarthritis of knee    Thrombocytopenia (CMS/[facility])    Hypothyroidism, adult    Thyroid nodule    Varicose veins of both lower extremities with inflammation    Chronic venous stasis dermatitis of both lower extremities    Vasculitis of skin    Vasculitis (CMS/[facility])      Current Outpatient Medications   Medication Instructions    acetaminophen (TYLENOL) 1,000 mg, oral, Every 6 hours    albuterol 0.63 mg/3 mL nebulizer solution USE 1 UNIT DOSE IN NEBULIZER EVERY 4 TO 6 HOURS AS NEEDED.    aspirin 81 mg, oral, Daily    atorvastatin (LIPITOR) 40 mg, oral, Daily    blood sugar diagnostic (True Metrix Glucose Test Strip) strip 3 times daily    calamine-zinc oxide 8-8 % lotion APPLY 2-3 TIMES DAILY TO AFFECTED AREA(S).    capsaicin (Zostrix) 0.025 % cream Topical, 2 times daily    cholecalciferol (VITAMIN D-3) 25 mcg, oral, Daily, (Please, give a 3 month supply)    diclofenac sodium 1 % kit apply to affected areas 2-3 times daily    fluticasone (Flovent) 110 mcg/actuation inhaler 2 puffs, inhalation, 2 times daily RT, RINSE MOUTH AFTER USE.    FreeStyle Miguel 2 [place] misc     FreeStyle Miguel 2 Sensor kit     gabapentin (NEURONTIN) 900 mg, oral, Nightly    Heartburn Relief, famotidine, 10 mg tablet TAKE 1 TABLET BY MOUTH EVERYDAY AT BEDTIME    insulin NPH and regular human (HumuLIN 70/30 U-100 KwikPen) 100 unit/mL (70-30) injection 25 Units, subcutaneous, 2 times daily with meals    lancets 30 gauge misc USE AS DIRECTED.    lancets 33 gauge misc TEST 3 TIMES A DAY AND AS NEEDED    levothyroxine (SYNTHROID, LEVOXYL) 25 mcg, oral, Every Day    lidocaine (Lidoderm) 5 % patch 1 patch, transdermal, Every Day    lidocaine 4 % cream Topical, 2 times daily PRN     "loratadine (Claritin) 10 mg tablet 1 tablet, oral, Daily PRN    methadone (Methadose) 40 mg dispersible tablet 2 tablets, oral, Daily    multivitamin tablet 1 tablet, oral, Daily    NON FORMULARY Baclofen 2 % CREA    pen needle 1/2\" 29G X 12mm needle USE AS DIRECTED.    pen needle, diabetic (PEN NEEDLE MISC) miscellaneous    white petrolatum 61 % cream APPLY TO BOTH LEGS TWICE A DAY      Past Surgical History:   Procedure Laterality Date    OTHER SURGICAL HISTORY  05/10/2022    Lumpectomy    OTHER SURGICAL HISTORY  01/27/2022    Nephrectomy partial      Allergies   Allergen Reactions    Other Unknown     Sulfa containing compounds      Social History     Tobacco Use    Smoking status: Never    Smokeless tobacco: Never   Substance Use Topics    Alcohol use: Never    Drug use: Never      Family History   Problem Relation Name Age of Onset    Diabetes Other multiple Family Members         Review of Systems  Ten point review of systems negative unless specified in HPI.     Objective   Vitals: /70 (BP Location: Right arm, Patient Position: Sitting, BP Cuff Size: Adult)   Pulse 73   Temp 36.4 °C (97.5 °F) (Temporal)   Wt 85.2 kg (187 lb 12.8 oz)   SpO2 94%   BMI 34.35 kg/m²      Physical Exam  General:  Well developed. Alert. No acute distress. In electric wheelchair  Skin:  Warm, dry. normal skin turgor. no rashes. no lesions.   Head: NCAT  EENT: MMM  Cardiovascular:  Regular rate and rhythm, normal S1 and S2, no gallops, no murmurs and no pericardial rub.  Pulmonary:  CTAB in all fields  Abdomen:  (+) BS. soft. non-tender. non-distended. no abdominal masses. no guarding or rigidity.  Neurologic:  grossly intact  Musculoskeletal: reduced ROM of BLE. Unable to rise out of wheelchair without assistance, unable to ambulate  Psychiatric:  appropriate mood and affect    Assessment/Plan   Diagnoses and all orders for this visit:  Bilateral leg pain  -     will obtain ferritin to r/o restless leg syndrome based on " worsening at night  -     increase gabapentin to 900 mg HS      RTC in 1 month, or earlier as needed.  Attending Supervision: Patient discussed with attending physician Dr Saurav Watters.   Sylvester Mazariegos MD  PGY-2, Family Medicine.

## 2023-07-26 NOTE — PROGRESS NOTES
I reviewed with the resident the medical history and the resident’s findings on physical examination.  I discussed with the resident the patient’s diagnosis and concur with the treatment plan as documented in the resident note.     Saurav Watters MD

## 2023-08-24 ENCOUNTER — OFFICE VISIT (OUTPATIENT)
Dept: PRIMARY CARE | Facility: CLINIC | Age: 73
End: 2023-08-24
Payer: COMMERCIAL

## 2023-08-24 ENCOUNTER — LAB (OUTPATIENT)
Dept: LAB | Facility: LAB | Age: 73
End: 2023-08-24
Payer: COMMERCIAL

## 2023-08-24 VITALS
HEIGHT: 63 IN | TEMPERATURE: 98 F | OXYGEN SATURATION: 100 % | HEART RATE: 73 BPM | DIASTOLIC BLOOD PRESSURE: 75 MMHG | WEIGHT: 189.1 LBS | SYSTOLIC BLOOD PRESSURE: 136 MMHG | RESPIRATION RATE: 18 BRPM | BODY MASS INDEX: 33.5 KG/M2

## 2023-08-24 DIAGNOSIS — R05.8 PRODUCTIVE COUGH: ICD-10-CM

## 2023-08-24 DIAGNOSIS — R06.00 ACUTE DYSPNEA: Primary | ICD-10-CM

## 2023-08-24 DIAGNOSIS — R06.2 WHEEZING: ICD-10-CM

## 2023-08-24 DIAGNOSIS — R06.00 ACUTE DYSPNEA: ICD-10-CM

## 2023-08-24 PROCEDURE — 94640 AIRWAY INHALATION TREATMENT: CPT

## 2023-08-24 PROCEDURE — 99214 OFFICE O/P EST MOD 30 MIN: CPT

## 2023-08-24 PROCEDURE — 1036F TOBACCO NON-USER: CPT

## 2023-08-24 PROCEDURE — 1160F RVW MEDS BY RX/DR IN RCRD: CPT

## 2023-08-24 PROCEDURE — 1125F AMNT PAIN NOTED PAIN PRSNT: CPT

## 2023-08-24 PROCEDURE — 1159F MED LIST DOCD IN RCRD: CPT

## 2023-08-24 PROCEDURE — 3078F DIAST BP <80 MM HG: CPT

## 2023-08-24 PROCEDURE — 3052F HG A1C>EQUAL 8.0%<EQUAL 9.0%: CPT

## 2023-08-24 PROCEDURE — 87635 SARS-COV-2 COVID-19 AMP PRB: CPT

## 2023-08-24 PROCEDURE — 3075F SYST BP GE 130 - 139MM HG: CPT

## 2023-08-24 RX ORDER — IPRATROPIUM BROMIDE AND ALBUTEROL SULFATE 2.5; .5 MG/3ML; MG/3ML
3 SOLUTION RESPIRATORY (INHALATION) ONCE
Status: COMPLETED | OUTPATIENT
Start: 2023-08-24 | End: 2023-08-24

## 2023-08-24 RX ADMIN — IPRATROPIUM BROMIDE AND ALBUTEROL SULFATE 3 ML: 2.5; .5 SOLUTION RESPIRATORY (INHALATION) at 11:14

## 2023-08-24 ASSESSMENT — PAIN SCALES - GENERAL: PAINLEVEL: 8
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
## 2023-08-24 NOTE — PROGRESS NOTES
Subjective   Patient ID: Sara is a 73 y.o. female with PMH of t2dm, breast cancer, and hypothyroidism who presents for Follow-up and Cough (Bad cough, needs a letter for her electric chair. ).    HPI  Cough, sob, for three days   Using albuterol twice a day  Yellowish sputum production   Ears plugged, sore throat  Starting to have diarrhea, having to run to bathroom, no n/v  Also having lightheadedness  No runny nose, no fevers    Requesting letter of support for new motorized wheelchair. Has had difficulties navigating it. Randomly goes faster/slower. Also difficult to control with neuropathy.     Patient Active Problem List   Diagnosis    Diabetes mellitus (CMS/HCC)    Decreased mobility    Diabetic mononeuropathy associated with type 2 diabetes mellitus (CMS/HCC)    Difficulty swallowing    DM2 (diabetes mellitus, type 2) (CMS/HCC)    Type 2 DM with CKD and hypertension    Cellulitis of buttock    Central retinal vein occlusion with macular edema of left eye    Chronic nonintractable headache    Chronic venous hypertension involving both sides    Chronic venous stasis    CKD (chronic kidney disease), stage III (CMS/HCC)    COPD (chronic obstructive pulmonary disease) (CMS/HCC)    Renal cell carcinoma of right kidney (CMS/HCC)    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)    Foot pain, bilateral    Left leg pain    Bilateral presbyopia    Breast cancer, right (CMS/HCC)    Breast mass, right    Breast skin changes    Cellulitis    Anxiety    Asthma    Astigmatism, bilateral    Axillary abscess    Benzodiazepine dependence (CMS/HCC)    Bilateral edema of lower extremity    Bilateral leg pain    Left adrenal mass (CMS/HCC)    Hypotension    Hyperparathyroidism, secondary (CMS/HCC)    Pseudophakia of both eyes    Hypermetropia of both eyes    Rash    Petechial rash    Hypergranulation    Frail elderly    GERD (gastroesophageal reflux disease)    Hav (hallux abducto valgus), left    Hearing loss     Hypercholesteremia    Left knee pain    Left wrist pain    Liver lesion    Lower back pain    Obesity, Class III, BMI 40-49.9 (morbid obesity) (CMS/HCC)    Onychocryptosis    Swelling of lower leg    Shortness of breath    Seasonal allergies    PVD (posterior vitreous detachment), both eyes    Pressure ulcer    Peripheral neuropathy    PCO (posterior capsular opacification), bilateral    Osteoarthritis of knee    Thrombocytopenia (CMS/HCC)    Hypothyroidism, adult    Thyroid nodule    Varicose veins of both lower extremities with inflammation    Chronic venous stasis dermatitis of both lower extremities    Vasculitis of skin    Vasculitis (CMS/HCC)      Current Outpatient Medications   Medication Instructions    acetaminophen (TYLENOL) 1,000 mg, oral, Every 6 hours    albuterol 0.63 mg/3 mL nebulizer solution USE 1 UNIT DOSE IN NEBULIZER EVERY 4 TO 6 HOURS AS NEEDED.    aspirin 81 mg, oral, Daily    atorvastatin (LIPITOR) 40 mg, oral, Daily    blood sugar diagnostic (True Metrix Glucose Test Strip) strip 3 times daily    calamine-zinc oxide 8-8 % lotion APPLY 2-3 TIMES DAILY TO AFFECTED AREA(S).    capsaicin (Zostrix) 0.025 % cream Topical, 2 times daily    cholecalciferol (VITAMIN D-3) 25 mcg, oral, Daily, (Please, give a 3 month supply)    diclofenac sodium 1 % kit apply to affected areas 2-3 times daily    fluticasone (Flovent) 110 mcg/actuation inhaler 2 puffs, inhalation, 2 times daily RT, RINSE MOUTH AFTER USE.    FreeStyle Miguel 2 [place] misc     FreeStyle Miguel 2 Sensor kit     gabapentin (NEURONTIN) 900 mg, oral, Nightly    Heartburn Relief, famotidine, 10 mg tablet TAKE 1 TABLET BY MOUTH EVERYDAY AT BEDTIME    insulin NPH and regular human (HumuLIN 70/30 U-100 KwikPen) 100 unit/mL (70-30) injection 25 Units, subcutaneous, 2 times daily with meals    lancets 30 gauge misc USE AS DIRECTED.    lancets 33 gauge misc TEST 3 TIMES A DAY AND AS NEEDED    levothyroxine (SYNTHROID, LEVOXYL) 25 mcg, oral, Every Day  "   lidocaine (Lidoderm) 5 % patch 1 patch, transdermal, Every Day    lidocaine 4 % cream Topical, 2 times daily PRN    loratadine (Claritin) 10 mg tablet 1 tablet, oral, Daily PRN    methadone (Methadose) 40 mg dispersible tablet 2 tablets, oral, Daily    multivitamin tablet 1 tablet, oral, Daily    NON FORMULARY Baclofen 2 % CREA    pen needle 1/2\" 29G X 12mm needle USE AS DIRECTED.    pen needle, diabetic (PEN NEEDLE MISC) miscellaneous    white petrolatum 61 % cream APPLY TO BOTH LEGS TWICE A DAY      Past Surgical History:   Procedure Laterality Date    OTHER SURGICAL HISTORY  05/10/2022    Lumpectomy    OTHER SURGICAL HISTORY  01/27/2022    Nephrectomy partial      Allergies   Allergen Reactions    Other Unknown     Sulfa containing compounds      Social History     Tobacco Use    Smoking status: Never    Smokeless tobacco: Never   Substance Use Topics    Alcohol use: Never    Drug use: Never      Family History   Problem Relation Name Age of Onset    Diabetes Other multiple Family Members         Review of Systems  Ten point review of systems negative unless specified in HPI.     Objective   Vitals: /75 (BP Location: Right arm, Patient Position: Sitting, BP Cuff Size: Adult)   Pulse 73   Temp 36.7 °C (98 °F) (Temporal)   Resp 18   Ht 1.6 m (5' 3\")   Wt 85.8 kg (189 lb 1.6 oz)   SpO2 100%   BMI 33.50 kg/m²      Physical Exam  General:  Well developed. Alert. No acute distress.   Skin:  Warm, dry. normal skin turgor. no rashes. no lesions.   Head: NCAT  EENT: MMM  Cardiovascular:  Regular rate and rhythm, normal S1 and S2, no gallops, no murmurs and no pericardial rub.  Pulmonary:  Diffuse wheezing and coarse lung sounds present  Abdomen:  (+) BS. soft. non-tender. non-distended. no abdominal masses. no guarding or rigidity.  Neurologic:  grossly intact  Musculoskeletal: in wheelchair.  Psychiatric:  appropriate mood and affect    Assessment/Plan   72 yo F with a PMH of t2dm, hypothyroidism, asthma, " and chronic BLE pain presenting for dyspnea and productive cough for three days. Vitals are stable with 100% O2, but exam with significant wheezing and coarse lung sounds diffusely.     Wheezing  Productive cough  Acute dyspnea  -     ipratropium-albuteroL (Duo-Neb) 0.5-2.5 mg/3 mL nebulizer solution 3 mL administered in office with improvement in symptoms  -     XR chest 2 views; Future  -     patient eligible for COVID treatment - Sars-CoV-2 PCR ordered  - Return/ED precautions provided    Malfunctioning wheelchair  - Letter of support written and provided to patient     Attending Supervision: Patient seen and discussed with attending physician Dr. Mohamud Martin.   Sylvester Mazariegos MD  PGY-2, Family Medicine.

## 2023-08-24 NOTE — Clinical Note
There is a missing charge for the Covid test due to it being out in as clinic to collect instead of lab to collect. Please correct this order so a charge is not needed. Thank you!

## 2023-08-25 LAB — SARS-COV-2 RESULT: NOT DETECTED

## 2023-08-31 NOTE — PROGRESS NOTES
I saw and evaluated the patient. I personally obtained the key and critical portions of the history and physical exam or was physically present for key and critical portions performed by the resident/fellow. I reviewed the resident/fellow's documentation and discussed the patient with the resident/fellow. I agree with the resident/fellow's medical decision making as documented in the note.    Mohamud Martin MD

## 2023-09-13 DIAGNOSIS — J44.9 CHRONIC OBSTRUCTIVE PULMONARY DISEASE, UNSPECIFIED COPD TYPE (MULTI): Primary | ICD-10-CM

## 2023-09-13 RX ORDER — FLUTICASONE PROPIONATE 110 UG/1
2 AEROSOL, METERED RESPIRATORY (INHALATION)
Qty: 12 G | Refills: 11 | Status: SHIPPED | OUTPATIENT
Start: 2023-09-13

## 2023-10-07 DIAGNOSIS — J45.909 ASTHMA, UNSPECIFIED ASTHMA SEVERITY, UNSPECIFIED WHETHER COMPLICATED, UNSPECIFIED WHETHER PERSISTENT (HHS-HCC): Primary | ICD-10-CM

## 2023-10-10 RX ORDER — CALCITRIOL 0.25 UG/1
0.25 CAPSULE ORAL DAILY
Qty: 1 CAPSULE | Refills: 0 | OUTPATIENT
Start: 2023-10-10

## 2023-10-16 RX ORDER — LORATADINE 10 MG/1
10 TABLET ORAL DAILY PRN
Qty: 30 TABLET | Refills: 6 | Status: SHIPPED | OUTPATIENT
Start: 2023-10-16

## 2023-10-19 ENCOUNTER — OFFICE VISIT (OUTPATIENT)
Dept: PRIMARY CARE | Facility: CLINIC | Age: 73
End: 2023-10-19
Payer: COMMERCIAL

## 2023-10-19 VITALS
SYSTOLIC BLOOD PRESSURE: 117 MMHG | TEMPERATURE: 98 F | HEART RATE: 78 BPM | BODY MASS INDEX: 35.14 KG/M2 | WEIGHT: 198.4 LBS | OXYGEN SATURATION: 96 % | DIASTOLIC BLOOD PRESSURE: 66 MMHG

## 2023-10-19 DIAGNOSIS — Z23 IMMUNIZATION DUE: ICD-10-CM

## 2023-10-19 DIAGNOSIS — E11.42 TYPE 2 DIABETES MELLITUS WITH DIABETIC POLYNEUROPATHY, WITH LONG-TERM CURRENT USE OF INSULIN (MULTI): ICD-10-CM

## 2023-10-19 DIAGNOSIS — Z79.4 TYPE 2 DIABETES MELLITUS WITH DIABETIC POLYNEUROPATHY, WITH LONG-TERM CURRENT USE OF INSULIN (MULTI): ICD-10-CM

## 2023-10-19 DIAGNOSIS — R21 RASH OF GENITALIA: Primary | ICD-10-CM

## 2023-10-19 PROCEDURE — 90471 IMMUNIZATION ADMIN: CPT

## 2023-10-19 PROCEDURE — 87529 HSV DNA AMP PROBE: CPT

## 2023-10-19 PROCEDURE — 90662 IIV NO PRSV INCREASED AG IM: CPT

## 2023-10-19 PROCEDURE — 1125F AMNT PAIN NOTED PAIN PRSNT: CPT

## 2023-10-19 PROCEDURE — 1160F RVW MEDS BY RX/DR IN RCRD: CPT

## 2023-10-19 PROCEDURE — 3078F DIAST BP <80 MM HG: CPT

## 2023-10-19 PROCEDURE — 3052F HG A1C>EQUAL 8.0%<EQUAL 9.0%: CPT

## 2023-10-19 PROCEDURE — 3074F SYST BP LT 130 MM HG: CPT

## 2023-10-19 PROCEDURE — 1159F MED LIST DOCD IN RCRD: CPT

## 2023-10-19 PROCEDURE — 99214 OFFICE O/P EST MOD 30 MIN: CPT

## 2023-10-19 PROCEDURE — 1036F TOBACCO NON-USER: CPT

## 2023-10-19 RX ORDER — LANCETS 33 GAUGE
EACH MISCELLANEOUS
Qty: 100 EACH | Refills: 3 | Status: SHIPPED | OUTPATIENT
Start: 2023-10-19 | End: 2023-11-01 | Stop reason: ALTCHOICE

## 2023-10-19 RX ORDER — PEN NEEDLE, DIABETIC 29 G X1/2"
NEEDLE, DISPOSABLE MISCELLANEOUS
Qty: 100 EACH | Refills: 3 | Status: SHIPPED | OUTPATIENT
Start: 2023-10-19 | End: 2023-11-30 | Stop reason: WASHOUT

## 2023-10-19 ASSESSMENT — ENCOUNTER SYMPTOMS
OCCASIONAL FEELINGS OF UNSTEADINESS: 1
DEPRESSION: 1
LOSS OF SENSATION IN FEET: 1

## 2023-10-19 NOTE — PROGRESS NOTES
"Subjective   Patient ID: Sara Santiago is a 73 y.o. female with PMHx of asthma, insulin-dependent diabetes, diabetic neuropathy substance abuse on methadone, renal cell carcinoma status post right partial nephrectomy in 2018, hypertension, hep C and hypothyroidism, T1c N1a M0, stage IIA invasive ductal carcinoma of the right breast s/p right partial mastectomy with sentinel lymph node biopsy who is presenting for follow up visit.    HPI     # T2DM  - last A1c 8 in March 2023   - Pt does not follow insulin regimen as prescribed   - Pt states that if she notices her blood sugar is over 200, she will take 20 or 25 units of insulin, otherwise, does not take insulin.   - Pt reports blood sugar of 285 yesterday, took 20 units of insulin, then woke up at 1 am and was \"shaking\"  - Pt has been experiencing episodes of hypoglycemia that \"do not happen often\"  - One episode of hypoglycemia last week, one episode last night with blood sugar of 40   - Checks blood sugar about four times a day whenever she wants, usually after meals    # Genital Rash  - Describes a rash on the outside of her genitals that has been present for a week and a half  - Describes \"burning\" whenever she sits down  - Rash is not itchy, but pt will itch the rash in the shower  - Wears a pad for  urinary incontinence and notices that the rash will stick to it and bleed from the friction  - Has been treating with corn starch powder  - Denies dysuria    #Malfunctioning wheelchair  - States she needs a new wheelchair that she is able to use with a different control handle  - Experiences pain in her hands with this specific control handle  - Asking for a letter stating this    #HM  - Reports problems with hearing and vision  - Has not followed up with audiology or ophthalmology in a long time  - Pt would like the flu vaccine today     Review of Systems  12 pt ROS negative besides what is listed in HPI.    Objective   /66 (BP Location: Right arm, Patient " "Position: Sitting, BP Cuff Size: Adult)   Pulse 78   Temp 36.7 °C (98 °F) (Temporal)   Wt 90 kg (198 lb 6.4 oz)   SpO2 96%   BMI 35.14 kg/m²     Physical Exam  Constitutional:       Appearance: She is obese.      Comments: Using a wheelchair but able to stand up on her own during examination.    Cardiovascular:      Rate and Rhythm: Normal rate and regular rhythm.   Pulmonary:      Effort: Pulmonary effort is normal.      Breath sounds: Normal breath sounds.   Abdominal:      General: Abdomen is flat. Bowel sounds are normal.      Palpations: Abdomen is soft.   Genitourinary:     Labia:         Left: Rash, tenderness and lesion present.       Comments: Lesions present on left labium majora that appear vesicular with erythema. Slight pinpoint bleeding. Excoriation marks visualized.  Neurological:      Mental Status: She is alert and oriented to person, place, and time.   Psychiatric:         Mood and Affect: Mood normal.       Assessment/Plan   Sara Santiago is a 73 y.o. female with PMHx of asthma, insulin-dependent diabetes, diabetic neuropathy substance abuse on methadone, renal cell carcinoma status post right partial nephrectomy in 2018, hypertension, hep C and hypothyroidism, T1c N1a M0, stage IIA invasive ductal carcinoma of the right breast s/p right partial mastectomy with sentinel lymph node biopsy who is presenting for follow up visit.    Pt's chief complaint today involves episodes of hypoglycemia and a genital rash. Pt has experienced two episodes of hypoglycemia over the past two weeks where she has felt very \"shaky\". The pt also is not compliant with her prescribed insulin regimen and reports taking insulin on her own terms, noncompliant with the insulin regimen that is in her chart. She also reports checking her own blood sugar about 4 times a day whenever she wants to, but usually after meals. She reports a genital rash that started 1.5 weeks ago that burns and bleeds when there Is friction with " "her pad. Clear excoriation marks are seen on exam, as well as concern for vesicular lesions. Plan is as follows:    # T2DM  - Pt with multiple episodes of hypoglycemia with symptoms of jitters  - Non-compliant with current insulin regimen, using it as needed  - Referred to endocrinology for T2DM management that works with patient's wishes to use as needed  - Ordered repeat A1c and CMP   - DM supplies refill sent   - Recommended eye and ear exam (referral provided)     # Genital Rash  - Red, burning rash when sitting down   - Physical exam shows vesicular, erythematous lesions on left labium majora with excoriation marks  - Swabbed lesions for HSV PCR  - Counseled pt to not itch the rash as much as possible  - Recommended pt use a panty liner instead of a pad and use white petrolatum ointment (Aquaphor) on lesion as a barrier method to decrease friction  - Follow up with HSV results    # Malfunctioning wheelchair  - Pt requesting new wheelchair as hers is not conducive to hand pain  - Provided pt with letter to provide wheelchair company with     # HM  - Pt received flu shot today    Ruth Ann Forbes, MS3  [employer] [employer]    I saw and evaluated the patient with the medical student. I personally obtained the key and critical portions of the history and physical exam. I reviewed and modified the student's documentation and discussed patient with the medical student. I agree with the above documentation and medical decision making.    Seen and discussed with Dr Veronica Winkler MD  Family Medicine PGY2     Problem List Items Addressed This Visit             ICD-10-CM       Endocrine/Metabolic    DM2 (diabetes mellitus, type 2) (CMS/[facility]) E11.9    Relevant Medications    lancets 33 gauge misc    pen needle, diabetic (Pen Needle) 31 gauge x 1/4\" needle    Other Relevant Orders    Referral to Endocrinology    Referral to Ophthalmology    Referral to Audiology     Other Visit Diagnoses  "        Codes    Rash of genitalia    -  Primary R21    Relevant Medications    white petrolatum 41 % ointment ointment    Other Relevant Orders    HSV PCR (Completed)    Immunization due     Z23    Relevant Orders    Flu vaccine, quadrivalent, high-dose, preservative free, age 65y+ (FLUZONE) (Completed)

## 2023-10-20 LAB
HSV1 DNA SKIN QL NAA+PROBE: NOT DETECTED
HSV2 DNA SKIN QL NAA+PROBE: NOT DETECTED

## 2023-10-25 ENCOUNTER — NURSE TRIAGE (OUTPATIENT)
Dept: ADMISSION | Facility: HOSPITAL | Age: 73
End: 2023-10-25
Payer: COMMERCIAL

## 2023-10-25 DIAGNOSIS — C50.911 MALIGNANT NEOPLASM OF RIGHT FEMALE BREAST, UNSPECIFIED ESTROGEN RECEPTOR STATUS, UNSPECIFIED SITE OF BREAST (MULTI): Primary | ICD-10-CM

## 2023-10-25 NOTE — TELEPHONE ENCOUNTER
Per MD, pt can see either her or Meme for follow up/to determine POC and what imaging she needs. Imaging from her ER visit isn't visible to us yet. I called the pt back to discuss, no answer. I left her a detailed VM letting her know that I was entering scheduling orders for her to see Dr. Alicea or Meme first available. I also asked her to call us if she developed any new/worsening symptoms.

## 2023-10-25 NOTE — TELEPHONE ENCOUNTER
"Additional Information   Is your chronic cough lasting more than 2 to 3 weeks?     2 weeks or longer - pt said she coughs frequently due to the asthma   Commented on: Review EMR for h/o immunotherapy. If positive, ask if patient is having new or worsening problems breathing - symptoms may be related to immune-related pneumonitis     Pt reports she has asthma and has been SOB intermittently \"for a while.\" She said she uses her inhaler for her asthma. Mainly SOB with activity which is ongoing and not new. She said she hasn't been SOB recently.   Commented on: When did these problems start?     Pt reports she developed a productive cough with yellow sputum and a sore throat/runny nose 2 weeks ago. Her cough is the only remaining symptom but she is still producing yellow mucous. No fever or chest pain.   Commented on: What else do you want to tell me about this problem?     Pt reports that she feels dizzy intermittently when she stands up too fast. She said this hasn't happened recently, but has happened in the past. Also endorses intermittent headaches and nausea. She is not having any of these symptoms today, though. She is staying adequately hydrated, but she feels like she is not eating enough (mainly sandwiches she said).    Protocols used: Cough    Patient initially called stating that she fell yesterday and her daughter took her to the [facility] ER for evaluation. Per the pt, they did imaging and incidentally found a lymph node in her neck that they told her was concerning so they asked her to call her oncologist to make an appt. However, the pt said she has had a cold/upper respiratory infection for the past 2 weeks that is just now getting better. Of all the symptoms mentioned above, she said her cough is the only symptom she is experiencing today (which is ongoing due to her asthma hx). Pt wants to know if she should be concerned about the LN and wants to know if she should see Dr. Alicea earlier than December. " Message sent to the team.

## 2023-10-30 DIAGNOSIS — E21.3 HYPERPARATHYROIDISM (MULTI): Primary | ICD-10-CM

## 2023-11-01 ENCOUNTER — OFFICE VISIT (OUTPATIENT)
Dept: NEPHROLOGY | Facility: CLINIC | Age: 73
End: 2023-11-01
Payer: COMMERCIAL

## 2023-11-01 VITALS — HEART RATE: 70 BPM | DIASTOLIC BLOOD PRESSURE: 63 MMHG | SYSTOLIC BLOOD PRESSURE: 106 MMHG

## 2023-11-01 DIAGNOSIS — I10 ESSENTIAL HYPERTENSION: ICD-10-CM

## 2023-11-01 DIAGNOSIS — M79.605 BILATERAL LEG PAIN: ICD-10-CM

## 2023-11-01 DIAGNOSIS — E08.22 DIABETES MELLITUS DUE TO UNDERLYING CONDITION WITH DIABETIC CHRONIC KIDNEY DISEASE, UNSPECIFIED CKD STAGE, UNSPECIFIED WHETHER LONG TERM INSULIN USE (MULTI): Primary | ICD-10-CM

## 2023-11-01 DIAGNOSIS — E21.3 HYPERPARATHYROIDISM (MULTI): ICD-10-CM

## 2023-11-01 DIAGNOSIS — N18.31 STAGE 3A CHRONIC KIDNEY DISEASE (MULTI): ICD-10-CM

## 2023-11-01 DIAGNOSIS — M79.604 BILATERAL LEG PAIN: ICD-10-CM

## 2023-11-01 PROCEDURE — 1125F AMNT PAIN NOTED PAIN PRSNT: CPT | Performed by: INTERNAL MEDICINE

## 2023-11-01 PROCEDURE — 1160F RVW MEDS BY RX/DR IN RCRD: CPT | Performed by: INTERNAL MEDICINE

## 2023-11-01 PROCEDURE — 3074F SYST BP LT 130 MM HG: CPT | Performed by: INTERNAL MEDICINE

## 2023-11-01 PROCEDURE — 3066F NEPHROPATHY DOC TX: CPT | Performed by: INTERNAL MEDICINE

## 2023-11-01 PROCEDURE — 1036F TOBACCO NON-USER: CPT | Performed by: INTERNAL MEDICINE

## 2023-11-01 PROCEDURE — 3078F DIAST BP <80 MM HG: CPT | Performed by: INTERNAL MEDICINE

## 2023-11-01 PROCEDURE — 3052F HG A1C>EQUAL 8.0%<EQUAL 9.0%: CPT | Performed by: INTERNAL MEDICINE

## 2023-11-01 PROCEDURE — 1159F MED LIST DOCD IN RCRD: CPT | Performed by: INTERNAL MEDICINE

## 2023-11-01 PROCEDURE — 99214 OFFICE O/P EST MOD 30 MIN: CPT | Performed by: INTERNAL MEDICINE

## 2023-11-01 RX ORDER — GABAPENTIN 300 MG/1
900 CAPSULE ORAL NIGHTLY
Qty: 90 CAPSULE | Refills: 3 | Status: SHIPPED | OUTPATIENT
Start: 2023-11-01 | End: 2023-11-30 | Stop reason: SDUPTHER

## 2023-11-01 RX ORDER — CALCITRIOL 0.25 UG/1
0.25 CAPSULE ORAL DAILY
Qty: 30 CAPSULE | Refills: 11 | Status: SHIPPED | OUTPATIENT
Start: 2023-11-01

## 2023-11-01 NOTE — PROGRESS NOTES
Sara Santiago   73 y.o.    @@  [facility]/Room: [id number]/Room/bed info not found    Subjective:   The patient is being seen for a routine clinic follow-up of chronic kidney disease. Recently, the disease has been stable. Disease complications:  No hyperkalemia, no hypocalcemia, no hyperphosphatemia, no metabolic acidosis, no coagulopathy, no uremic encephalopathy, no neuropathy and no renal osteodystrophy. The patient is currently asymptomatic. No associated symptoms are reported.       Meds:   Current Outpatient Medications   Medication Sig Dispense Refill    acetaminophen (Tylenol) 500 mg tablet Take 2 tablets (1,000 mg) by mouth every 6 hours. 180 tablet 3    albuterol 0.63 mg/3 mL nebulizer solution USE 1 UNIT DOSE IN NEBULIZER EVERY 4 TO 6 HOURS AS NEEDED.      aspirin 81 mg EC tablet Take 1 tablet (81 mg) by mouth once daily. 90 tablet 3    blood sugar diagnostic (True Metrix Glucose Test Strip) strip in the morning, at noon, and at bedtime.      calamine-zinc oxide 8-8 % lotion APPLY 2-3 TIMES DAILY TO AFFECTED AREA(S).      capsaicin (Zostrix) 0.025 % cream Apply topically 2 times a day. 56.6 g 3    cholecalciferol (Vitamin D-3) 25 MCG (1000 UT) tablet Take 1 tablet (25 mcg) by mouth once daily. (Please, give a 3 month supply) 90 tablet 3    diclofenac sodium 1 % kit apply to affected areas 2-3 times daily      fluticasone (Flovent) 110 mcg/actuation inhaler Inhale 2 puffs 2 times a day. RINSE MOUTH AFTER USE. 12 g 11    FreeStyle Miguel 2 [place] misc       FreeStyle Miguel 2 Sensor kit       gabapentin (Neurontin) 300 mg capsule Take 3 capsules (900 mg) by mouth once daily at bedtime. 90 capsule 3    insulin NPH and regular human (HumuLIN 70/30 U-100 KwikPen) 100 unit/mL (70-30) injection Inject 25 Units under the skin 2 times a day with meals.      lancets 30 gauge misc USE AS DIRECTED.      levothyroxine (Synthroid, Levoxyl) 25 mcg tablet Take 1 tablet (25 mcg) by mouth once every day. 90 tablet 3    lidocaine  "(Lidoderm) 5 % patch Place 1 patch on the skin once every day.      lidocaine 4 % cream Apply topically 2 times a day as needed for mild pain (1 - 3). 28 g 3    loratadine (Claritin) 10 mg tablet TAKE 1 TABLET BY MOUTH EVERY DAY AS NEEDED 30 tablet 6    methadone (Methadose) 40 mg dispersible tablet Take 2 tablets (80 mg) by mouth once daily.      multivitamin tablet Take 1 tablet by mouth once daily. 90 tablet 3    pen needle 1/2\" 29G X 12mm needle USE AS DIRECTED.      pen needle, diabetic (Pen Needle) 31 gauge x 1/4\" needle Take humulin 25 units  each 3    white petrolatum 41 % ointment ointment Apply 1 Application topically 3 times a day. 85 g 1    atorvastatin (Lipitor) 40 mg tablet Take 1 tablet (40 mg) by mouth once daily. (Patient not taking: Reported on 11/1/2023) 90 tablet 3    Heartburn Relief, famotidine, 10 mg tablet TAKE 1 TABLET BY MOUTH EVERYDAY AT BEDTIME (Patient not taking: Reported on 11/1/2023) 30 tablet 11    lancets 33 gauge misc TEST 3 TIMES A DAY AND AS NEEDED (Patient not taking: Reported on 11/1/2023) 100 each 3    NON FORMULARY Baclofen 2 % CREA      white petrolatum 61 % cream APPLY TO BOTH LEGS TWICE A DAY       No current facility-administered medications for this visit.          ROS:  The patient is awake and oriented. No dizziness or lightheadedness. No chills and no fever. No headaches. No nausea and no vomiting. No shortness of breath. No cough. No sputum. No chest pain. No chest tightness. No abdominal pain. No diarrhea and no constipation. No hematemesis or hemoptysis. No hematuria. No rectal bleeding. No melena. No epistaxis. No urinary symptoms. No flank pain. No leg edema. No leg pain. No weakness. No itching. Overall, the rest of the review of systems is also negative.  12 point review of systems otherwise negative as stated in HPI.        Physical Examination:        Vitals:    11/01/23 0909   BP: 106/63   Pulse: 70     General: The patient is awake, oriented, and is " not in any distress.  Head and Neck: Normocephalic. No periorbital edema.  Eyes: non-icteric  Respiratory: Symmetric air entry. Symmetric chest expansion.No respiratory distress.  Cardiovascular: Symmetric peripheral pulses.  Skin: No maculopapular rash.  Abdomen: soft, nt/nd  Musculoskeletal: No peripheral edema in both left and right upper extremities.  No edema in either left or right lower extremities.  Neuro Exam: Speech is fluent. Moves extremities.    Imaging:  === 06/16/22 ===    US RENAL COMPLETE    - Impression -  1. Status post partial right nephrectomy with bilateral small kidneys  consistent with chronic medical renal disease.  2. No evidence of new mass lesions or hydronephrosis.    I personally reviewed the images/study and I agree with the findings  as stated. This study was interpreted at [place], [place].       Blood Labs:  No results found for this or any previous visit (from the past 24 hour(s)).   Lab Results   Component Value Date    .9 (H) 07/13/2022    PROTUR NEGATIVE 10/20/2022    PROTUR 29 (H) 06/09/2022    PHOS 4.1 11/07/2022      Lab Results   Component Value Date    GLUCOSE 226 (H) 05/12/2023    CALCIUM 9.6 05/12/2023     05/12/2023    K 5.0 05/12/2023    CO2 30 05/12/2023     05/12/2023    BUN 26 (H) 05/12/2023    CREATININE 1.38 (H) 05/12/2023         Assessment and Plan:  The patient is a 72-year-old female with history of renal cell carcinoma status post partial right nephrectomy. She also has diagnosis of invasive ductal breast cancer status postlumpectomy and she had radiation treatment but no  chemotherapy. As per oncology note because of several underlying medical problems she is considered high risk for chemotherapy.      1. Chronic kidney disease stage III. Last creatinine level is 1.28. Normal K and bicarb level. She is a status post right partial nephrectomy because of renal cell carcinoma.      2. Diabetes. No  proteinuria based on the last spot urine protein to creatinine ratio.     3. Breast cancer.     4. Renal cell carcinoma. Status post partial right nephrectomy.     5. Secondary hyperparathyroidism. On calcitriol. PTH level will be checked before the next appointment.        I will see her in about 6 months for follow-up.           Freedom Smith MD

## 2023-11-07 DIAGNOSIS — R26.89 DECREASED MOBILITY: Primary | ICD-10-CM

## 2023-11-10 DIAGNOSIS — G56.01 CARPAL TUNNEL SYNDROME OF RIGHT WRIST: ICD-10-CM

## 2023-11-10 DIAGNOSIS — K21.9 GASTROESOPHAGEAL REFLUX DISEASE, UNSPECIFIED WHETHER ESOPHAGITIS PRESENT: Primary | ICD-10-CM

## 2023-11-10 DIAGNOSIS — M65.342 TRIGGER RING FINGER OF LEFT HAND: ICD-10-CM

## 2023-11-11 RX ORDER — ACETAMINOPHEN 500 MG
1000 TABLET ORAL EVERY 6 HOURS
Qty: 180 TABLET | Refills: 3 | Status: SHIPPED | OUTPATIENT
Start: 2023-11-11 | End: 2023-11-30 | Stop reason: SDUPTHER

## 2023-11-11 RX ORDER — FAMOTIDINE 10 MG/1
10 TABLET ORAL NIGHTLY
Qty: 30 TABLET | Refills: 11 | Status: SHIPPED | OUTPATIENT
Start: 2023-11-11

## 2023-11-30 ENCOUNTER — OFFICE VISIT (OUTPATIENT)
Dept: PRIMARY CARE | Facility: CLINIC | Age: 73
End: 2023-11-30
Payer: COMMERCIAL

## 2023-11-30 VITALS
SYSTOLIC BLOOD PRESSURE: 138 MMHG | TEMPERATURE: 97.8 F | DIASTOLIC BLOOD PRESSURE: 69 MMHG | HEART RATE: 70 BPM | HEIGHT: 63 IN | WEIGHT: 204 LBS | OXYGEN SATURATION: 97 % | BODY MASS INDEX: 36.14 KG/M2

## 2023-11-30 DIAGNOSIS — E11.42 TYPE 2 DIABETES MELLITUS WITH DIABETIC POLYNEUROPATHY, WITH LONG-TERM CURRENT USE OF INSULIN (MULTI): Primary | ICD-10-CM

## 2023-11-30 DIAGNOSIS — E03.9 HYPOTHYROIDISM, UNSPECIFIED TYPE: ICD-10-CM

## 2023-11-30 DIAGNOSIS — M65.342 TRIGGER RING FINGER OF LEFT HAND: ICD-10-CM

## 2023-11-30 DIAGNOSIS — G56.01 CARPAL TUNNEL SYNDROME OF RIGHT WRIST: ICD-10-CM

## 2023-11-30 DIAGNOSIS — Z79.4 TYPE 2 DIABETES MELLITUS WITH DIABETIC POLYNEUROPATHY, WITH LONG-TERM CURRENT USE OF INSULIN (MULTI): Primary | ICD-10-CM

## 2023-11-30 DIAGNOSIS — M79.604 BILATERAL LEG PAIN: ICD-10-CM

## 2023-11-30 DIAGNOSIS — M79.605 BILATERAL LEG PAIN: ICD-10-CM

## 2023-11-30 PROCEDURE — 3052F HG A1C>EQUAL 8.0%<EQUAL 9.0%: CPT

## 2023-11-30 PROCEDURE — 99213 OFFICE O/P EST LOW 20 MIN: CPT

## 2023-11-30 PROCEDURE — 3078F DIAST BP <80 MM HG: CPT

## 2023-11-30 PROCEDURE — 3066F NEPHROPATHY DOC TX: CPT

## 2023-11-30 PROCEDURE — 1036F TOBACCO NON-USER: CPT

## 2023-11-30 PROCEDURE — 1160F RVW MEDS BY RX/DR IN RCRD: CPT

## 2023-11-30 PROCEDURE — 3075F SYST BP GE 130 - 139MM HG: CPT

## 2023-11-30 PROCEDURE — 1125F AMNT PAIN NOTED PAIN PRSNT: CPT

## 2023-11-30 PROCEDURE — 1159F MED LIST DOCD IN RCRD: CPT

## 2023-11-30 RX ORDER — PEN NEEDLE, DIABETIC 29 G X1/2"
NEEDLE, DISPOSABLE MISCELLANEOUS
Qty: 100 EACH | Refills: 11 | Status: SHIPPED | OUTPATIENT
Start: 2023-11-30 | End: 2024-02-29 | Stop reason: SDUPTHER

## 2023-11-30 RX ORDER — ACETAMINOPHEN 500 MG
1000 TABLET ORAL EVERY 6 HOURS
Qty: 180 TABLET | Refills: 3 | Status: SHIPPED | OUTPATIENT
Start: 2023-11-30

## 2023-11-30 RX ORDER — GABAPENTIN 300 MG/1
900 CAPSULE ORAL NIGHTLY
Qty: 90 CAPSULE | Refills: 3 | Status: SHIPPED | OUTPATIENT
Start: 2023-11-30 | End: 2024-02-29 | Stop reason: SDUPTHER

## 2023-11-30 RX ORDER — LEVOTHYROXINE SODIUM 25 UG/1
25 TABLET ORAL
Qty: 90 TABLET | Refills: 3 | Status: SHIPPED | OUTPATIENT
Start: 2023-11-30

## 2023-11-30 ASSESSMENT — PATIENT HEALTH QUESTIONNAIRE - PHQ9
1. LITTLE INTEREST OR PLEASURE IN DOING THINGS: NOT AT ALL
SUM OF ALL RESPONSES TO PHQ9 QUESTIONS 1 AND 2: 0
2. FEELING DOWN, DEPRESSED OR HOPELESS: NOT AT ALL

## 2023-11-30 ASSESSMENT — ENCOUNTER SYMPTOMS
LOSS OF SENSATION IN FEET: 1
DEPRESSION: 1
OCCASIONAL FEELINGS OF UNSTEADINESS: 1

## 2023-11-30 ASSESSMENT — PAIN SCALES - GENERAL: PAINLEVEL: 8

## 2023-11-30 NOTE — PROGRESS NOTES
Subjective   Patient ID: Sara Santiago is a 73 y.o. female with a significant PMH as below who presents for Follow-up (Pt said she needs some paper work done).    HPI  #Fall from chair  - fell asleep in armless chair and fell  - hit head, no neurological symptoms   - bruising over right cheek, and right elbow  - no issues with chewing, or eye pain; no vision changes    #malfunctioning wheelchair  - requesting further documentation  - has been provided with >3 separate letters from different providers, however unclear as to specific needs  - provided phone numbers: [phone] or [phone]    #refill requests:  - tylenol 500: 2 tabs q6h prn  - gabapentin 900 mg HS  - synthroid 25 mcg  - 29G pen needles    Patient Active Problem List   Diagnosis    Diabetes mellitus (CMS/HCC)    Decreased mobility    Diabetic mononeuropathy associated with type 2 diabetes mellitus (CMS/HCC)    Difficulty swallowing    DM2 (diabetes mellitus, type 2) (CMS/HCC)    Type 2 DM with CKD and hypertension    Cellulitis of buttock    Central retinal vein occlusion with macular edema of left eye    Chronic nonintractable headache    Chronic venous hypertension involving both sides    Chronic venous stasis    CKD (chronic kidney disease), stage III (CMS/HCC)    COPD (chronic obstructive pulmonary disease) (CMS/HCC)    Renal cell carcinoma of right kidney (CMS/HCC)    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)    Foot pain, bilateral    Left leg pain    Bilateral presbyopia    Breast cancer, right (CMS/HCC)    Breast mass, right    Breast skin changes    Cellulitis    Anxiety    Asthma    Astigmatism, bilateral    Axillary abscess    Benzodiazepine dependence (CMS/HCC)    Bilateral edema of lower extremity    Bilateral leg pain    Left adrenal mass (CMS/HCC)    Hypotension    Hyperparathyroidism, secondary (CMS/HCC)    Pseudophakia of both eyes    Hypermetropia of both eyes    Rash    Petechial rash    Hypergranulation    Frail elderly     GERD (gastroesophageal reflux disease)    Hav (hallux abducto valgus), left    Hearing loss    Hypercholesteremia    Left knee pain    Left wrist pain    Liver lesion    Lower back pain    Obesity, Class III, BMI 40-49.9 (morbid obesity) (CMS/[facility])    Onychocryptosis    Swelling of lower leg    Shortness of breath    Seasonal allergies    PVD (posterior vitreous detachment), both eyes    Pressure ulcer    Peripheral neuropathy    PCO (posterior capsular opacification), bilateral    Osteoarthritis of knee    Thrombocytopenia (CMS/[facility])    Hypothyroidism, adult    Thyroid nodule    Varicose veins of both lower extremities with inflammation    Chronic venous stasis dermatitis of both lower extremities    Vasculitis of skin    Vasculitis (CMS/[facility])      Current Outpatient Medications   Medication Instructions    acetaminophen (TYLENOL) 1,000 mg, oral, Every 6 hours    albuterol 0.63 mg/3 mL nebulizer solution USE 1 UNIT DOSE IN NEBULIZER EVERY 4 TO 6 HOURS AS NEEDED.    aspirin 81 mg, oral, Daily    blood sugar diagnostic (True Metrix Glucose Test Strip) strip 3 times daily    calamine-zinc oxide 8-8 % lotion APPLY 2-3 TIMES DAILY TO AFFECTED AREA(S).    calcitriol (ROCALTROL) 0.25 mcg, oral, Daily    capsaicin (Zostrix) 0.025 % cream Topical, 2 times daily    cholecalciferol (VITAMIN D-3) 25 mcg, oral, Daily, (Please, give a 3 month supply)    diclofenac sodium 1 % kit apply to affected areas 2-3 times daily    fluticasone (Flovent) 110 mcg/actuation inhaler 2 puffs, inhalation, 2 times daily RT, RINSE MOUTH AFTER USE.    FreeStyle Miguel 2 [place] misc     FreeStyle Miguel 2 Sensor kit     gabapentin (NEURONTIN) 900 mg, oral, Nightly    Heartburn Relief (famotidine) 10 mg, oral, Nightly    insulin NPH and regular human (HumuLIN 70/30 U-100 KwikPen) 100 unit/mL (70-30) injection 25 Units, subcutaneous, 2 times daily with meals    lancets 30 gauge misc USE AS DIRECTED.    levothyroxine (SYNTHROID, LEVOXYL) 25 mcg, oral, Every  "Day    lidocaine (Lidoderm) 5 % patch 1 patch, transdermal, Every Day    lidocaine 4 % cream Topical, 2 times daily PRN    loratadine (CLARITIN) 10 mg, oral, Daily PRN    methadone (Methadose) 40 mg dispersible tablet 2 tablets, oral, Daily    multivitamin tablet 1 tablet, oral, Daily    pen needle 1/2\" 29G X 12mm needle USE AS DIRECTED.    pen needle, diabetic (Pen Needle) 31 gauge x 1/4\" needle Take humulin 25 units BID    white petrolatum 41 % ointment ointment 1 Application, Topical, 3 times daily      Past Surgical History:   Procedure Laterality Date    CT ANGIO NECK  10/24/2023    CT NECK ANGIO W AND WO IV CONTRAST 10/24/2023    OTHER SURGICAL HISTORY  05/10/2022    Lumpectomy    OTHER SURGICAL HISTORY  01/27/2022    Nephrectomy partial      Allergies   Allergen Reactions    Other Unknown     Sulfa containing compounds      Social History     Tobacco Use    Smoking status: Former     Types: Cigarettes    Smokeless tobacco: Never   Substance Use Topics    Alcohol use: Never    Drug use: Not Currently     Types: Marijuana      Family History   Problem Relation Name Age of Onset    Diabetes Other multiple Family Members         Review of Systems  Ten point review of systems negative unless specified in HPI.     Objective   Vitals: /69 (BP Location: Left arm, Patient Position: Sitting)   Pulse 70   Temp 36.6 °C (97.8 °F)   Ht 1.6 m (5' 3\")   Wt 92.5 kg (204 lb)   SpO2 97%   BMI 36.14 kg/m²      Physical Exam  General:  Well developed. Alert. No acute distress, in wheelchair  Skin:  Warm, dry. normal skin turgor. no rashes. no lesions.   Head: small ecchymosis over right mandible, mild TTP;    EENT: MMM; EOM intact   Cardiovascular:  Regular rate and rhythm, normal S1 and S2, no gallops, no murmurs and no pericardial rub.  Pulmonary:  CTAB in all fields  Abdomen:  (+) BS. soft. non-tender. non-distended. no abdominal masses. no guarding or rigidity.  Neurologic:  grossly intact  Musculoskeletal: moves " "all extremities spontaneously, limited BL LE ROM  Psychiatric:  appropriate mood and affect    Assessment/Plan   72 yo F here for fu. Vitals stable, exam unremarkable.     Mechanical fall  - Presented to ED, evaluated by trauma surgery  - No acute findings, some minimal residual bruising  - Continue tylenol PRN    Type 2 diabetes mellitus with diabetic polyneuropathy, with long-term current use of insulin (CMS/[facility])  -     pen needle 1/2\" 29G X 12mm needle; USE AS DIRECTED.    Bilateral leg pain  -     gabapentin (Neurontin) 300 mg capsule; Take 3 capsules (900 mg) by mouth once daily at bedtime.  - Discussed wheelchair with Pamela at [facility] from phone number provided    - all required documentation already submitted, patient is pending appointment with technologist and therapist, which should be made during the next week (12/4-12/9)    Hypothyroidism, unspecified type  -     levothyroxine (Synthroid, Levoxyl) 25 mcg tablet; Take 1 tablet (25 mcg) by mouth once every day.        RTC as needed.  Attending Supervision: discussed with attending physician Dr. Mohamud Martin.  Sylvester Mazariegos MD  PGY-2, Family Medicine.      "

## 2023-12-04 DIAGNOSIS — M79.604 BILATERAL LEG PAIN: Primary | ICD-10-CM

## 2023-12-04 DIAGNOSIS — M79.605 BILATERAL LEG PAIN: Primary | ICD-10-CM

## 2023-12-04 NOTE — PROGRESS NOTES
I reviewed the resident/fellow's documentation and discussed the patient with the resident/fellow. I agree with the resident/fellow's medical decision making as documented in the note.    Mohamud Martin MD

## 2023-12-13 ENCOUNTER — APPOINTMENT (OUTPATIENT)
Dept: HEMATOLOGY/ONCOLOGY | Facility: HOSPITAL | Age: 73
End: 2023-12-13
Payer: COMMERCIAL

## 2024-01-04 ENCOUNTER — CLINICAL SUPPORT (OUTPATIENT)
Dept: PHYSICAL THERAPY | Facility: CLINIC | Age: 74
End: 2024-01-04
Payer: COMMERCIAL

## 2024-01-04 DIAGNOSIS — R26.89 DECREASED MOBILITY: ICD-10-CM

## 2024-01-04 PROCEDURE — 97542 WHEELCHAIR MNGMENT TRAINING: CPT | Mod: GP

## 2024-01-04 NOTE — PROGRESS NOTES
Physical Therapy    Physical Therapy Evaluation and Treatment    Patient Name: Sara Santiago  MRN: [id number]  Today's Date: 1/4/2024  Time Calculation  Start Time: 1345  Stop Time: 1500  Time Calculation (min): 75 min    Insurance   [employer]   Referring Physician: Mohamud Martin MD       Assessment:  Upper body and lower body manual muscle tests all between 4 and 4+ out of 5. Upper and lower body AROM all within normal limit except impaired hip flexion AROM bilaterally. Patient reports pain with bilateral shoulder flexion, extension, abduction, external rotation; and pain with left knee extension. Patient demonstrates impaired  strength: 7lbs on right and 6 lbs on left. She was only able to propel a manual chair using bilateral upper extremities for 30 seconds over 12 feet before being limited by shoulder and back pain, rated 7/10.   Patient only able to ambulate 4 feet with rollator at decreased speed with heavy reliance on upper extremities. After 4 feet, patient needed to sit down due to bilateral leg and knee pain rated 7/10.     Plan:  PT Plan: No Additional PT interventions required at this time      Current Problem:  1. Decreased mobility  Referral to Physical Therapy          Subjective    ORLIN Spence present for session with patient's permission.     History: started losing balance about 5 years ago due to knee pain. Has fallen 5-6 times in the past year. Most recent fall in October 2023 was due to sitting in a chair in her room, reached forward to get something off the floor, and fell off the chair.     CLOF:   Has had current chair for 5 years and was using a power chair before this as well. Has been using a power chair for 9 years total.   Does not use the power chair in her home currently; the chair stays in her garage, per her family's preference.   She uses a rollator in the home. She sits in the rollator seat and scoots around the home. She walks into the bathroom holding on to the sink.    Transfers in and out of the chair from bed/couch/chairs using the rollator.   Has functional incontinence; wears briefs.     PAIN:   Bilateral knee pain is what prevents her from walking  L shoulder pain   Back pain   All 10/10 at the worst.     States she is losing her eye sight because of diabetes.     Precautions/PMH:   Per patient and EMR:   + DM II with neuropathy in BLEs and vision loss  + Hx kidney CA, CKD stage III   + Hx breast CA  + obesity   + cellulitis of buttocks  + pressure ulcer bilateral buttocks   + COPD, +asthma   + hx trigger finger on L hand    Objective   Transfers: sit to stand with BUEs on wheelchair CGA  Gait: rollator, extremely decreased speed, decreased hip flexion bilat     Wheelchair propulsion:   6 minutes push test:  Propelled manual wheelchair with BUEs for 30 seconds, for a distance of  12 feet; pt terminated test due to B shoulder and back pain 7/10.     Baseline Vitals:   BP  111/63   mmHg  HR   68  bpm   SpO2  94   %    Height 5'3  Weight 204 lbs    Pressure relief:   Chair push up: unable due to body habitus/impaired UE strength   Lateral lean: able to hold for 10 seconds before increased LBP and shoulder pain limiting     TUG Timed Score: 41 seconds completed, ambulated 4 feet before needing to sit in rollator seat due to BLE pain, therefore test discontinued     Dermatomes B UE and LE: EC WNL to light touch bilat   Upper Trapezius (C4):  Lateral Deltoid (C5):  Tip of 1st Digit (C6):  Tip of 3rd Digit (C7):  Fifth Digit (C8):  Medial Upper Arm (T1):  Mid-Anterior Thigh (L2):  Medial Knee (L3):  Medial Malleolus (L4):  Dorsal Second Toe Web Space (L5):  Lateral Malleolus (S1):    Myotomes/Strength (MMT in sitting):  Shoulder Elevation (C4) R/L: 4 / 4  Shoulder Abduction (C5) R/L: 4 / 4  Shoulder Flexion R/L: 4 / 4  Shoulder Extension R/L: 4+ / 4 +  Shoulder IR R/L:  4+ / 4+  Shoulder ER R/L: 4+ / 4+  Elbow Flexion/Wrist Ext (C6) R/L: 4 / 4  Elbow Extension/Wrist Flexion (C7)  R/L: 4 / 4  Thumb Ext/Ulnar Deviation (C8) R/L: 4 / 4  Hand Intrinsics- abd/add (T1) R/L: 4 / 4  Hip Flex (L2) R/L: 4 / 4  Hip Add R/L: 4 / 4  Hip Abd R/L: 4+ / 4+    Knee Ext (L3) R/L: 4 / 4 * L knee pain   Knee Flex R/L: 4+ / 4+   Ankle DF (L4) R/L: 4 / 4  Ankle PF (S1) R/L: 4 / 4    Abdominals (sitting unsupported): 3  Back Extensors (sitting unsupported): 4     Range of Motion:  *= B shoulder pain 8/10   Shoulder Flexion R/L: WNL * / WNL *   Shoulder Extension R/L: WNL * / WNL *   Shoulder Abduction R/L: WNL * / WNL *   Shoulder ER R/L:  WNL * / WNL *   Elbow Flexion R/L:  WNL / WNL   Elbow Extension R/L: WNL / WNL   Hip Flex R/L:  min impaired/ min impaired   Hip Add R/L: WNL / WNL  Hip Abd R/L: WNL / WNL  Knee Flex R/L: WNL / WNL  Knee Ext R/L: WNL / WNL  Ankle DF R/L: WNL / WNL  Ankle PF R/L: WNL / WNL     strength R/L: 7 lbs/ 6 lbs     Treatments:  Wheelchair Management: 75 minutes 5 units   See above assessment.     OP EDUCATION:  Offered skilled PT services to patient for pain/gait/balance.     Goals:  No goals set. No future visits planned.

## 2024-01-11 ENCOUNTER — OFFICE VISIT (OUTPATIENT)
Dept: PRIMARY CARE | Facility: CLINIC | Age: 74
End: 2024-01-11
Payer: COMMERCIAL

## 2024-01-11 ENCOUNTER — TELEPHONE (OUTPATIENT)
Dept: PRIMARY CARE | Facility: CLINIC | Age: 74
End: 2024-01-11

## 2024-01-11 VITALS
WEIGHT: 190 LBS | TEMPERATURE: 97.2 F | OXYGEN SATURATION: 97 % | SYSTOLIC BLOOD PRESSURE: 150 MMHG | DIASTOLIC BLOOD PRESSURE: 77 MMHG | BODY MASS INDEX: 33.66 KG/M2 | HEART RATE: 66 BPM | HEIGHT: 63 IN

## 2024-01-11 DIAGNOSIS — R26.89 DECREASED MOBILITY: Primary | ICD-10-CM

## 2024-01-11 DIAGNOSIS — E11.9 TYPE 2 DIABETES MELLITUS WITHOUT COMPLICATION, WITH LONG-TERM CURRENT USE OF INSULIN (MULTI): ICD-10-CM

## 2024-01-11 DIAGNOSIS — J44.9 CHRONIC OBSTRUCTIVE PULMONARY DISEASE, UNSPECIFIED COPD TYPE (MULTI): ICD-10-CM

## 2024-01-11 DIAGNOSIS — Z79.4 TYPE 2 DIABETES MELLITUS WITHOUT COMPLICATION, WITH LONG-TERM CURRENT USE OF INSULIN (MULTI): ICD-10-CM

## 2024-01-11 PROCEDURE — 3077F SYST BP >= 140 MM HG: CPT

## 2024-01-11 PROCEDURE — 1036F TOBACCO NON-USER: CPT

## 2024-01-11 PROCEDURE — 1160F RVW MEDS BY RX/DR IN RCRD: CPT

## 2024-01-11 PROCEDURE — 1159F MED LIST DOCD IN RCRD: CPT

## 2024-01-11 PROCEDURE — 3078F DIAST BP <80 MM HG: CPT

## 2024-01-11 PROCEDURE — 1125F AMNT PAIN NOTED PAIN PRSNT: CPT

## 2024-01-11 PROCEDURE — 99213 OFFICE O/P EST LOW 20 MIN: CPT

## 2024-01-11 PROCEDURE — 3066F NEPHROPATHY DOC TX: CPT

## 2024-01-11 RX ORDER — LANOLIN ALCOHOL/MO/W.PET/CERES
CREAM (GRAM) TOPICAL
COMMUNITY
Start: 2024-01-03 | End: 2024-02-07 | Stop reason: SDUPTHER

## 2024-01-11 RX ORDER — SERTRALINE HYDROCHLORIDE 100 MG/1
200 TABLET, FILM COATED ORAL DAILY
COMMUNITY
Start: 2023-12-23 | End: 2024-02-29 | Stop reason: SDUPTHER

## 2024-01-11 RX ORDER — TRAZODONE HYDROCHLORIDE 50 MG/1
50 TABLET ORAL NIGHTLY
COMMUNITY
Start: 2021-03-05

## 2024-01-11 RX ORDER — KETOCONAZOLE 20 MG/G
15 CREAM TOPICAL EVERY 12 HOURS
COMMUNITY
Start: 2019-04-30 | End: 2024-05-13 | Stop reason: ENTERED-IN-ERROR

## 2024-01-11 RX ORDER — ATORVASTATIN CALCIUM 40 MG/1
TABLET, FILM COATED ORAL
COMMUNITY
Start: 2020-03-23 | End: 2024-05-23

## 2024-01-11 RX ORDER — GUAIFENESIN 100 MG/5ML
SOLUTION ORAL
COMMUNITY
Start: 2019-07-05 | End: 2024-05-13 | Stop reason: ENTERED-IN-ERROR

## 2024-01-11 RX ORDER — EMOLLIENT COMBINATION NO.112
CREAM (GRAM) TOPICAL
COMMUNITY
Start: 2021-04-27 | End: 2024-05-13 | Stop reason: ENTERED-IN-ERROR

## 2024-01-11 RX ORDER — BACLOFEN 100 %
POWDER (GRAM) MISCELLANEOUS
COMMUNITY
Start: 2023-12-29 | End: 2024-05-13 | Stop reason: ENTERED-IN-ERROR

## 2024-01-11 RX ORDER — POLYETHYLENE GLYCOL 3350 17 G/17G
POWDER, FOR SOLUTION ORAL
COMMUNITY
Start: 2019-07-05 | End: 2024-02-29 | Stop reason: SDUPTHER

## 2024-01-11 RX ORDER — NYSTATIN 100000 [USP'U]/G
15 POWDER TOPICAL EVERY 12 HOURS
COMMUNITY
Start: 2019-09-03 | End: 2024-05-13 | Stop reason: ENTERED-IN-ERROR

## 2024-01-11 RX ORDER — LATANOPROST/PF 0.005 %
DROPS OPHTHALMIC (EYE)
COMMUNITY
Start: 2021-04-17 | End: 2024-05-13 | Stop reason: ENTERED-IN-ERROR

## 2024-01-11 ASSESSMENT — PAIN SCALES - GENERAL: PAINLEVEL: 7

## 2024-01-11 NOTE — LETTER
Sara Santiago  : 1950  Juror number: [id number]; Draw number [id number]  2024    To Whom This May Concern:    My patient, Sara Santiago, is unable to perform Jury Duty due to a mental or physical condition that renders the prospective juror unfit for jury service for the remainder of the jury year.    Should you have any questions please do not hesitate to call.    Thank you for your cooperation.    Sincerely,      Sylvester Mazariegos MD  PGY-2, Family Medicine.

## 2024-01-11 NOTE — LETTER
Sara Santiago  1950    1/15/2024    To Whom This May Concern:    My patient, Sara Santiago has a medical history not limited to type 2 diabetes, COPD, and limited mobility as a result of multiple musculoskeletal conditions. She requires medication administration at various times during the day and is at risk of becoming critically ill if not administered appropriately. Her mental and physical conditions render the prospective juror unfit for jury service for the remainder of the jury year.    Should you have any questions please do not hesitate to call.    Thank you for your cooperation.    Sincerely,    Sylvester Mazariegos MD

## 2024-01-11 NOTE — TELEPHONE ENCOUNTER
Call placed to pt to inform letter r/t jury duty is ready for pick-up. No answer, voicemail box full. SMS with nurse callback number sent via pt phone voicemail alternate option.

## 2024-01-15 NOTE — PROGRESS NOTES
Subjective   Patient ID: Sara Santiago is a 73 y.o. female with who presents for paperwork.    HPI  - no concerns today  - would like paperwork filled out for jury duty excuse   - cannot perform due to limited mobility and pain  - also requires frequent medication administration, including insulin during the day  - denies any worsening of pain, no SOB, chest pain, vision changes, or swelling    Patient Active Problem List   Diagnosis    Diabetes mellitus (CMS/HCC)    Decreased mobility    Diabetic mononeuropathy associated with type 2 diabetes mellitus (CMS/HCC)    Difficulty swallowing    DM2 (diabetes mellitus, type 2) (CMS/HCC)    Type 2 DM with CKD and hypertension    Cellulitis of buttock    Central retinal vein occlusion with macular edema of left eye    Chronic nonintractable headache    Chronic venous hypertension involving both sides    Chronic venous stasis    CKD (chronic kidney disease), stage III (CMS/HCC)    COPD (chronic obstructive pulmonary disease) (CMS/HCC)    Renal cell carcinoma of right kidney (CMS/HCC)    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)    Foot pain, bilateral    Left leg pain    Bilateral presbyopia    Breast cancer, right (CMS/HCC)    Breast mass, right    Breast skin changes    Cellulitis    Anxiety    Asthma    Astigmatism, bilateral    Axillary abscess    Benzodiazepine dependence (CMS/HCC)    Bilateral edema of lower extremity    Bilateral leg pain    Left adrenal mass (CMS/HCC)    Hypotension    Hyperparathyroidism, secondary (CMS/HCC)    Pseudophakia of both eyes    Hypermetropia of both eyes    Rash    Petechial rash    Hypergranulation    Frail elderly    GERD (gastroesophageal reflux disease)    Hav (hallux abducto valgus), left    Hearing loss    Hypercholesteremia    Left knee pain    Left wrist pain    Liver lesion    Lower back pain    Obesity, Class III, BMI 40-49.9 (morbid obesity) (CMS/HCC)    Onychocryptosis    Swelling of lower leg    Shortness of breath     Seasonal allergies    PVD (posterior vitreous detachment), both eyes    Pressure ulcer    Peripheral neuropathy    PCO (posterior capsular opacification), bilateral    Osteoarthritis of knee    Thrombocytopenia (CMS/HCC)    Hypothyroidism, adult    Thyroid nodule    Varicose veins of both lower extremities with inflammation    Chronic venous stasis dermatitis of both lower extremities    Vasculitis of skin    Vasculitis (CMS/HCC)      Current Outpatient Medications   Medication Instructions    acetaminophen (TYLENOL) 1,000 mg, oral, Every 6 hours    albuterol 0.63 mg/3 mL nebulizer solution USE 1 UNIT DOSE IN NEBULIZER EVERY 4 TO 6 HOURS AS NEEDED.    aspirin 81 mg, oral, Daily    atorvastatin (Lipitor) 40 mg tablet oral, Daily RT    baclofen (Lioresal) 100 % powder powder     blood sugar diagnostic (True Metrix Glucose Test Strip) strip 3 times daily    calamine-zinc oxide 8-8 % lotion APPLY 2-3 TIMES DAILY TO AFFECTED AREA(S).    calcitriol (ROCALTROL) 0.25 mcg, oral, Daily    capsaicin (Zostrix) 0.025 % cream Topical, 2 times daily    cholecalciferol (VITAMIN D-3) 25 mcg, oral, Daily, (Please, give a 3 month supply)    diclofenac sodium 1 % kit apply to affected areas 2-3 times daily    emollient (Biafine) cream Emollient Base External Cream APPLY TO BOTH LEGS TWICE A DAY Quantity: 20 Refills: 3 Ordered: 27-Apr-2021 Kika Ohara MD Start : 27-Apr-2021 Active    fluticasone (Flovent) 110 mcg/actuation inhaler 2 puffs, inhalation, 2 times daily RT, RINSE MOUTH AFTER USE.    FreeStyle Miguel 2 Dolliver misc     FreeStyle Miguel 2 Sensor kit     gabapentin (NEURONTIN) 900 mg, oral, Nightly    guaiFENesin (Robitussin) 100 mg/5 mL syrup oral, Every 4 hours RT    Heartburn Relief (famotidine) 10 mg, oral, Nightly    insulin NPH and regular human (HumuLIN 70/30 U-100 KwikPen) 100 unit/mL (70-30) injection 25 Units, subcutaneous, 2 times daily with meals    ketoconazole (NIZORAL) 15 g, Every 12 hours    lancets 30  "gauge misc USE AS DIRECTED.    latanoprost, PF, 0.005 % drops ophthalmic (eye)    levothyroxine (SYNTHROID, LEVOXYL) 25 mcg, oral, Every Day    lidocaine (Lidoderm) 5 % patch 1 patch, transdermal, Every Day    lidocaine 4 % cream Topical, 2 times daily PRN    loratadine (CLARITIN) 10 mg, oral, Daily PRN    methadone (Methadose) 40 mg dispersible tablet 2 tablets, oral, Daily    Minerin Creme cream APPLY TO BOTH LEGS TWICE A DAY    mometasone-formoterol (Dulera 200) 200-5 mcg/actuation inhaler inhalation, Every 12 hours    multivitamin tablet 1 tablet, oral, Daily    nystatin (MYCOSTATIN) 15 g, Every 12 hours    pen needle 1/2\" 29G X 12mm needle USE AS DIRECTED.    polyethylene glycol (Miralax) 17 gram/dose powder oral    sertraline (ZOLOFT) 200 mg, oral, Daily    traZODone (Desyrel) 50 mg tablet oral    white petrolatum 41 % ointment ointment 1 Application, Topical, 3 times daily      Past Surgical History:   Procedure Laterality Date    CT ANGIO NECK  10/24/2023    CT NECK ANGIO W AND WO IV CONTRAST 10/24/2023    OTHER SURGICAL HISTORY  05/10/2022    Lumpectomy    OTHER SURGICAL HISTORY  01/27/2022    Nephrectomy partial      Allergies   Allergen Reactions    Other Other and Unknown     Sulfa containing compounds    Sulfa (Sulfonamide Antibiotics) Unknown      Social History     Tobacco Use    Smoking status: Former     Types: Cigarettes    Smokeless tobacco: Never   Substance Use Topics    Alcohol use: Never    Drug use: Not Currently     Types: Marijuana      Family History   Problem Relation Name Age of Onset    Diabetes Other multiple Family Members         Review of Systems  Ten point review of systems negative unless specified in HPI.     Objective   Vitals: /77 (BP Location: Right arm, Patient Position: Sitting)   Pulse 66   Temp 36.2 °C (97.2 °F) (Temporal)   Ht 1.6 m (5' 3\")   Wt 86.2 kg (190 lb)   SpO2 97%   BMI 33.66 kg/m²      Physical Exam  General:  Well developed. Alert. No acute " distress.  Skin:  Warm, dry. normal skin turgor. no rashes. no lesions.   Head: NCAT  EENT: MMM  Cardiovascular:  Regular rate and rhythm, normal S1 and S2, no gallops, no murmurs and no pericardial rub.  Pulmonary:  CTAB in all fields  Abdomen:  (+) BS. soft. non-tender. non-distended. no abdominal masses. no guarding or rigidity.  Neurologic:  grossly intact  Musculoskeletal: in mechanical wheelchair, reduced leg extension ROM bilaterally; 3-4/5 strength  Psychiatric:  appropriate mood and affect    Assessment/Plan   72 yo F here for paperwork.     Jury duty letter was provided for patient. I advised her that she will need to fill out the patient information form prior to letter being faxed. However, she was unaware of it. The information form was subsequently found within the mail packet provided by her, and she was called to have this filled out and signed by her.      Attending Supervision: discussed with attending physician Dr. Mohamud Martin.   Sylvester Mazariegos MD  PGY-2, Family Medicine.

## 2024-01-24 DIAGNOSIS — G62.9 NEUROPATHY: ICD-10-CM

## 2024-01-24 RX ORDER — CHOLECALCIFEROL (VITAMIN D3) 25 MCG
1000 TABLET ORAL DAILY
Qty: 90 TABLET | Refills: 3 | Status: SHIPPED | OUTPATIENT
Start: 2024-01-24

## 2024-02-01 ENCOUNTER — APPOINTMENT (OUTPATIENT)
Dept: PRIMARY CARE | Facility: CLINIC | Age: 74
End: 2024-02-01
Payer: COMMERCIAL

## 2024-02-07 DIAGNOSIS — I87.2 CHRONIC VENOUS STASIS DERMATITIS OF BOTH LOWER EXTREMITIES: Primary | ICD-10-CM

## 2024-02-20 ENCOUNTER — LAB (OUTPATIENT)
Dept: LAB | Facility: LAB | Age: 74
End: 2024-02-20
Payer: COMMERCIAL

## 2024-02-20 DIAGNOSIS — Z20.5 CONTACT WITH AND (SUSPECTED) EXPOSURE TO VIRAL HEPATITIS: ICD-10-CM

## 2024-02-20 DIAGNOSIS — Z20.9 CONTACT WITH AND (SUSPECTED) EXPOSURE TO UNSPECIFIED COMMUNICABLE DISEASE: Primary | ICD-10-CM

## 2024-02-20 LAB
ALBUMIN SERPL BCP-MCNC: 3.7 G/DL (ref 3.4–5)
ALP SERPL-CCNC: 95 U/L (ref 33–136)
ALT SERPL W P-5'-P-CCNC: 8 U/L (ref 7–45)
ANION GAP SERPL CALC-SCNC: 13 MMOL/L (ref 10–20)
AST SERPL W P-5'-P-CCNC: 13 U/L (ref 9–39)
BASOPHILS # BLD AUTO: 0.04 X10*3/UL (ref 0–0.1)
BASOPHILS NFR BLD AUTO: 0.7 %
BILIRUB SERPL-MCNC: 0.5 MG/DL (ref 0–1.2)
BUN SERPL-MCNC: 23 MG/DL (ref 6–23)
CALCIUM SERPL-MCNC: 9.2 MG/DL (ref 8.6–10.6)
CHLORIDE SERPL-SCNC: 103 MMOL/L (ref 98–107)
CO2 SERPL-SCNC: 27 MMOL/L (ref 21–32)
CREAT SERPL-MCNC: 1.28 MG/DL (ref 0.5–1.05)
EGFRCR SERPLBLD CKD-EPI 2021: 44 ML/MIN/1.73M*2
EOSINOPHIL # BLD AUTO: 0.26 X10*3/UL (ref 0–0.4)
EOSINOPHIL NFR BLD AUTO: 4.7 %
ERYTHROCYTE [DISTWIDTH] IN BLOOD BY AUTOMATED COUNT: 13.7 % (ref 11.5–14.5)
GLUCOSE SERPL-MCNC: 174 MG/DL (ref 74–99)
HAV IGM SER QL: NONREACTIVE
HBV CORE AB SER QL: REACTIVE
HBV SURFACE AB SER-ACNC: 4.5 MIU/ML
HBV SURFACE AG SERPL QL IA: NONREACTIVE
HCT VFR BLD AUTO: 35.7 % (ref 36–46)
HCV AB SER QL: REACTIVE
HGB BLD-MCNC: 11.9 G/DL (ref 12–16)
HIV 1+2 AB+HIV1 P24 AG SERPL QL IA: NONREACTIVE
IMM GRANULOCYTES # BLD AUTO: 0.02 X10*3/UL (ref 0–0.5)
IMM GRANULOCYTES NFR BLD AUTO: 0.4 % (ref 0–0.9)
LYMPHOCYTES # BLD AUTO: 0.93 X10*3/UL (ref 0.8–3)
LYMPHOCYTES NFR BLD AUTO: 16.7 %
MCH RBC QN AUTO: 30.8 PG (ref 26–34)
MCHC RBC AUTO-ENTMCNC: 33.3 G/DL (ref 32–36)
MCV RBC AUTO: 93 FL (ref 80–100)
MONOCYTES # BLD AUTO: 0.45 X10*3/UL (ref 0.05–0.8)
MONOCYTES NFR BLD AUTO: 8.1 %
NEUTROPHILS # BLD AUTO: 3.87 X10*3/UL (ref 1.6–5.5)
NEUTROPHILS NFR BLD AUTO: 69.4 %
NRBC BLD-RTO: 0 /100 WBCS (ref 0–0)
PLATELET # BLD AUTO: 138 X10*3/UL (ref 150–450)
POTASSIUM SERPL-SCNC: 4.5 MMOL/L (ref 3.5–5.3)
PROT SERPL-MCNC: 7.6 G/DL (ref 6.4–8.2)
RBC # BLD AUTO: 3.86 X10*6/UL (ref 4–5.2)
SODIUM SERPL-SCNC: 138 MMOL/L (ref 136–145)
TREPONEMA PALLIDUM IGG+IGM AB [PRESENCE] IN SERUM OR PLASMA BY IMMUNOASSAY: REACTIVE
WBC # BLD AUTO: 5.6 X10*3/UL (ref 4.4–11.3)

## 2024-02-20 PROCEDURE — 86318 IA INFECTIOUS AGENT ANTIBODY: CPT

## 2024-02-20 PROCEDURE — 80053 COMPREHEN METABOLIC PANEL: CPT

## 2024-02-20 PROCEDURE — 87389 HIV-1 AG W/HIV-1&-2 AB AG IA: CPT

## 2024-02-20 PROCEDURE — 36415 COLL VENOUS BLD VENIPUNCTURE: CPT

## 2024-02-20 PROCEDURE — 85025 COMPLETE CBC W/AUTO DIFF WBC: CPT

## 2024-02-20 PROCEDURE — 86780 TREPONEMA PALLIDUM: CPT

## 2024-02-20 PROCEDURE — 87340 HEPATITIS B SURFACE AG IA: CPT

## 2024-02-20 PROCEDURE — 86803 HEPATITIS C AB TEST: CPT

## 2024-02-20 PROCEDURE — 86709 HEPATITIS A IGM ANTIBODY: CPT

## 2024-02-20 PROCEDURE — 87522 HEPATITIS C REVRS TRNSCRPJ: CPT

## 2024-02-20 PROCEDURE — 86704 HEP B CORE ANTIBODY TOTAL: CPT

## 2024-02-20 PROCEDURE — 86706 HEP B SURFACE ANTIBODY: CPT

## 2024-02-21 LAB
RPR SER QL: REACTIVE
RPR SER-TITR: ABNORMAL {TITER}

## 2024-02-22 LAB
HCV RNA SERPL NAA+PROBE-ACNC: NOT DETECTED K[IU]/ML
HCV RNA SERPL NAA+PROBE-LOG IU: NORMAL {LOG_IU}/ML

## 2024-02-23 ENCOUNTER — HOSPITAL ENCOUNTER (EMERGENCY)
Facility: HOSPITAL | Age: 74
Discharge: HOME | End: 2024-02-23
Attending: EMERGENCY MEDICINE
Payer: COMMERCIAL

## 2024-02-23 ENCOUNTER — APPOINTMENT (OUTPATIENT)
Dept: RADIOLOGY | Facility: HOSPITAL | Age: 74
End: 2024-02-23
Payer: COMMERCIAL

## 2024-02-23 ENCOUNTER — HOSPITAL ENCOUNTER (OUTPATIENT)
Dept: CARDIOLOGY | Facility: HOSPITAL | Age: 74
Discharge: HOME | End: 2024-02-23
Payer: COMMERCIAL

## 2024-02-23 VITALS
BODY MASS INDEX: 35.44 KG/M2 | HEIGHT: 63 IN | WEIGHT: 200 LBS | RESPIRATION RATE: 20 BRPM | DIASTOLIC BLOOD PRESSURE: 63 MMHG | OXYGEN SATURATION: 95 % | SYSTOLIC BLOOD PRESSURE: 134 MMHG | TEMPERATURE: 97.2 F | HEART RATE: 76 BPM

## 2024-02-23 DIAGNOSIS — R10.30 LOWER ABDOMINAL PAIN: ICD-10-CM

## 2024-02-23 DIAGNOSIS — R31.9 URINARY TRACT INFECTION WITH HEMATURIA, SITE UNSPECIFIED: Primary | ICD-10-CM

## 2024-02-23 DIAGNOSIS — N39.0 URINARY TRACT INFECTION WITH HEMATURIA, SITE UNSPECIFIED: Primary | ICD-10-CM

## 2024-02-23 DIAGNOSIS — I24.9 ACS (ACUTE CORONARY SYNDROME) (MULTI): ICD-10-CM

## 2024-02-23 LAB
ALBUMIN SERPL BCP-MCNC: 3.7 G/DL (ref 3.4–5)
ALP SERPL-CCNC: 110 U/L (ref 33–136)
ALT SERPL W P-5'-P-CCNC: 7 U/L (ref 7–45)
ANION GAP SERPL CALC-SCNC: 10 MMOL/L (ref 10–20)
APPEARANCE UR: CLEAR
AST SERPL W P-5'-P-CCNC: 15 U/L (ref 9–39)
BACTERIA #/AREA URNS AUTO: ABNORMAL /HPF
BASOPHILS # BLD AUTO: 0.02 X10*3/UL (ref 0–0.1)
BASOPHILS NFR BLD AUTO: 0.4 %
BILIRUB SERPL-MCNC: 0.4 MG/DL (ref 0–1.2)
BILIRUB UR STRIP.AUTO-MCNC: NEGATIVE MG/DL
BUN SERPL-MCNC: 21 MG/DL (ref 6–23)
CALCIUM SERPL-MCNC: 8.9 MG/DL (ref 8.6–10.3)
CHLORIDE SERPL-SCNC: 98 MMOL/L (ref 98–107)
CO2 SERPL-SCNC: 25 MMOL/L (ref 21–32)
COLOR UR: YELLOW
CREAT SERPL-MCNC: 1.13 MG/DL (ref 0.5–1.05)
EGFRCR SERPLBLD CKD-EPI 2021: 51 ML/MIN/1.73M*2
EOSINOPHIL # BLD AUTO: 0.17 X10*3/UL (ref 0–0.4)
EOSINOPHIL NFR BLD AUTO: 3.4 %
ERYTHROCYTE [DISTWIDTH] IN BLOOD BY AUTOMATED COUNT: 13.7 % (ref 11.5–14.5)
GLUCOSE SERPL-MCNC: 224 MG/DL (ref 74–99)
GLUCOSE UR STRIP.AUTO-MCNC: NEGATIVE MG/DL
HCT VFR BLD AUTO: 37 % (ref 36–46)
HGB BLD-MCNC: 12.4 G/DL (ref 12–16)
IMM GRANULOCYTES # BLD AUTO: 0.02 X10*3/UL (ref 0–0.5)
IMM GRANULOCYTES NFR BLD AUTO: 0.4 % (ref 0–0.9)
KETONES UR STRIP.AUTO-MCNC: NEGATIVE MG/DL
LEUKOCYTE ESTERASE UR QL STRIP.AUTO: ABNORMAL
LYMPHOCYTES # BLD AUTO: 0.89 X10*3/UL (ref 0.8–3)
LYMPHOCYTES NFR BLD AUTO: 17.6 %
MCH RBC QN AUTO: 31.3 PG (ref 26–34)
MCHC RBC AUTO-ENTMCNC: 33.5 G/DL (ref 32–36)
MCV RBC AUTO: 93 FL (ref 80–100)
MONOCYTES # BLD AUTO: 0.32 X10*3/UL (ref 0.05–0.8)
MONOCYTES NFR BLD AUTO: 6.3 %
MUCOUS THREADS #/AREA URNS AUTO: ABNORMAL /LPF
NEUTROPHILS # BLD AUTO: 3.64 X10*3/UL (ref 1.6–5.5)
NEUTROPHILS NFR BLD AUTO: 71.9 %
NITRITE UR QL STRIP.AUTO: NEGATIVE
NRBC BLD-RTO: 0 /100 WBCS (ref 0–0)
PH UR STRIP.AUTO: 5 [PH]
PLATELET # BLD AUTO: 119 X10*3/UL (ref 150–450)
POTASSIUM SERPL-SCNC: 4.4 MMOL/L (ref 3.5–5.3)
PROT SERPL-MCNC: 7.7 G/DL (ref 6.4–8.2)
PROT UR STRIP.AUTO-MCNC: NEGATIVE MG/DL
RBC # BLD AUTO: 3.96 X10*6/UL (ref 4–5.2)
RBC # UR STRIP.AUTO: ABNORMAL /UL
RBC #/AREA URNS AUTO: ABNORMAL /HPF
SODIUM SERPL-SCNC: 129 MMOL/L (ref 136–145)
SP GR UR STRIP.AUTO: 1.01
SQUAMOUS #/AREA URNS AUTO: ABNORMAL /HPF
UROBILINOGEN UR STRIP.AUTO-MCNC: <2 MG/DL
WBC # BLD AUTO: 5.1 X10*3/UL (ref 4.4–11.3)
WBC #/AREA URNS AUTO: ABNORMAL /HPF
WBC CLUMPS #/AREA URNS AUTO: ABNORMAL /HPF

## 2024-02-23 PROCEDURE — 96361 HYDRATE IV INFUSION ADD-ON: CPT

## 2024-02-23 PROCEDURE — 87086 URINE CULTURE/COLONY COUNT: CPT | Mod: PARLAB | Performed by: EMERGENCY MEDICINE

## 2024-02-23 PROCEDURE — 93005 ELECTROCARDIOGRAM TRACING: CPT

## 2024-02-23 PROCEDURE — 81001 URINALYSIS AUTO W/SCOPE: CPT | Performed by: EMERGENCY MEDICINE

## 2024-02-23 PROCEDURE — 36415 COLL VENOUS BLD VENIPUNCTURE: CPT | Performed by: EMERGENCY MEDICINE

## 2024-02-23 PROCEDURE — 96365 THER/PROPH/DIAG IV INF INIT: CPT

## 2024-02-23 PROCEDURE — 85025 COMPLETE CBC W/AUTO DIFF WBC: CPT | Performed by: EMERGENCY MEDICINE

## 2024-02-23 PROCEDURE — 80053 COMPREHEN METABOLIC PANEL: CPT | Performed by: EMERGENCY MEDICINE

## 2024-02-23 PROCEDURE — 96375 TX/PRO/DX INJ NEW DRUG ADDON: CPT

## 2024-02-23 PROCEDURE — 99284 EMERGENCY DEPT VISIT MOD MDM: CPT | Mod: 25

## 2024-02-23 PROCEDURE — 74176 CT ABD & PELVIS W/O CONTRAST: CPT

## 2024-02-23 PROCEDURE — 74176 CT ABD & PELVIS W/O CONTRAST: CPT | Mod: FOREIGN READ | Performed by: RADIOLOGY

## 2024-02-23 PROCEDURE — 2500000004 HC RX 250 GENERAL PHARMACY W/ HCPCS (ALT 636 FOR OP/ED): Performed by: EMERGENCY MEDICINE

## 2024-02-23 PROCEDURE — 2500000004 HC RX 250 GENERAL PHARMACY W/ HCPCS (ALT 636 FOR OP/ED): Performed by: INTERNAL MEDICINE

## 2024-02-23 RX ORDER — PHENAZOPYRIDINE HYDROCHLORIDE 100 MG/1
100 TABLET, FILM COATED ORAL 3 TIMES DAILY PRN
Qty: 9 TABLET | Refills: 0 | Status: SHIPPED | OUTPATIENT
Start: 2024-02-23 | End: 2024-02-26

## 2024-02-23 RX ORDER — CEFTRIAXONE 1 G/50ML
1 INJECTION, SOLUTION INTRAVENOUS ONCE
Status: COMPLETED | OUTPATIENT
Start: 2024-02-23 | End: 2024-02-23

## 2024-02-23 RX ORDER — MORPHINE SULFATE 4 MG/ML
4 INJECTION, SOLUTION INTRAMUSCULAR; INTRAVENOUS ONCE
Status: COMPLETED | OUTPATIENT
Start: 2024-02-23 | End: 2024-02-23

## 2024-02-23 RX ORDER — CEPHALEXIN 500 MG/1
500 CAPSULE ORAL 3 TIMES DAILY
Qty: 21 CAPSULE | Refills: 0 | Status: SHIPPED | OUTPATIENT
Start: 2024-02-23 | End: 2024-03-01

## 2024-02-23 RX ORDER — ONDANSETRON HYDROCHLORIDE 2 MG/ML
4 INJECTION, SOLUTION INTRAVENOUS ONCE
Status: COMPLETED | OUTPATIENT
Start: 2024-02-23 | End: 2024-02-23

## 2024-02-23 RX ADMIN — ONDANSETRON 4 MG: 2 INJECTION INTRAMUSCULAR; INTRAVENOUS at 16:39

## 2024-02-23 RX ADMIN — SODIUM CHLORIDE 1000 ML: 9 INJECTION, SOLUTION INTRAVENOUS at 16:38

## 2024-02-23 RX ADMIN — MORPHINE SULFATE 4 MG: 4 INJECTION, SOLUTION INTRAMUSCULAR; INTRAVENOUS at 16:39

## 2024-02-23 RX ADMIN — CEFTRIAXONE SODIUM 1 G: 1 INJECTION, SOLUTION INTRAVENOUS at 17:38

## 2024-02-23 ASSESSMENT — PAIN SCALES - GENERAL
PAINLEVEL_OUTOF10: 10 - WORST POSSIBLE PAIN
PAINLEVEL_OUTOF10: 0 - NO PAIN
PAINLEVEL_OUTOF10: 10 - WORST POSSIBLE PAIN

## 2024-02-23 ASSESSMENT — PAIN DESCRIPTION - PAIN TYPE: TYPE: ACUTE PAIN;CHRONIC PAIN

## 2024-02-23 ASSESSMENT — PAIN DESCRIPTION - LOCATION
LOCATION: BACK
LOCATION: BACK

## 2024-02-23 ASSESSMENT — PAIN DESCRIPTION - FREQUENCY: FREQUENCY: CONSTANT/CONTINUOUS

## 2024-02-23 ASSESSMENT — LIFESTYLE VARIABLES
HAVE YOU EVER FELT YOU SHOULD CUT DOWN ON YOUR DRINKING: NO
EVER HAD A DRINK FIRST THING IN THE MORNING TO STEADY YOUR NERVES TO GET RID OF A HANGOVER: NO
EVER FELT BAD OR GUILTY ABOUT YOUR DRINKING: NO
HAVE PEOPLE ANNOYED YOU BY CRITICIZING YOUR DRINKING: NO

## 2024-02-23 ASSESSMENT — COLUMBIA-SUICIDE SEVERITY RATING SCALE - C-SSRS
2. HAVE YOU ACTUALLY HAD ANY THOUGHTS OF KILLING YOURSELF?: NO
1. IN THE PAST MONTH, HAVE YOU WISHED YOU WERE DEAD OR WISHED YOU COULD GO TO SLEEP AND NOT WAKE UP?: NO
6. HAVE YOU EVER DONE ANYTHING, STARTED TO DO ANYTHING, OR PREPARED TO DO ANYTHING TO END YOUR LIFE?: NO

## 2024-02-23 ASSESSMENT — PAIN - FUNCTIONAL ASSESSMENT
PAIN_FUNCTIONAL_ASSESSMENT: 0-10
PAIN_FUNCTIONAL_ASSESSMENT: 0-10

## 2024-02-23 ASSESSMENT — PAIN DESCRIPTION - ORIENTATION
ORIENTATION: RIGHT;LEFT;MID;LOWER
ORIENTATION: LEFT;RIGHT;LOWER;MID

## 2024-02-23 NOTE — PROGRESS NOTES
Emergency Medicine Transition of Care Note.    I received Sara Santiago in signout from Dr. No.  Please see the previous ED provider note for all HPI, PE and MDM up to the time of signout at 1628. This is in addition to the primary record.    In brief Sara Santiago is an 73 y.o. female presenting for No chief complaint on file.    At the time of signout we were awaiting: Labs and CT.     ED Course as of 02/25/24 2007 Fri Feb 23, 2024   1629 EKG interpreted by me. Sinus. 71. No acute ischemic changes. No ST elevation. [CD]   2011 Reevaluated patient.  Abdomen soft nontender.  Discussed findings.  Patient agrees to follow-up as outpatient. [JA]      ED Course User Index  [CD] Jovani No MD  [JA] Merrick Pérez DO         Diagnoses as of 02/25/24 2007   Lower abdominal pain   Urinary tract infection with hematuria, site unspecified       Medical Decision Making    Patient presenting for evaluation of lower abdominal pain.  Abdomen soft and nontender on my exam.  CT scan nontender.  No significant leukocytosis or anemia.  No gross electrolyte abnormality or renal dysfunction.  Urinalysis consistent with urinary tract infection.  IV antibiotic started in the ED.  Patient tolerating p.o.  Will treat outpatient with antibiotics.  Patient agrees to follow-up with her PCP and return to ED if having worsening symptoms or other concerns.  Final diagnoses:   [R10.30] Lower abdominal pain   [N39.0, R31.9] Urinary tract infection with hematuria, site unspecified           Procedure  Procedures    Merrick Pérez DO

## 2024-02-23 NOTE — ED PROVIDER NOTES
HPI   No chief complaint on file.      73-year-old female presents with lower abdominal pain for four days. It is bilateral. It occasionally radiating her back. She also has right flank pain. She was initially constipated. She took milk of magnesia. She then developed diarrhea. She is nauseated but no vomiting. She denies unusual food intake. She denies previous similar symptoms. Denies history of diverticulitis.                           No data recorded                   Patient History   Past Medical History:   Diagnosis Date    Abnormal findings on diagnostic imaging of other abdominal regions, including retroperitoneum     Abnormal CT of the abdomen    Abnormal findings on diagnostic imaging of other specified body structures     Abnormal computed tomography angiography (CTA)    Candidiasis of skin and nail 09/03/2019    Candidal intertrigo    Cellulitis of left lower limb 08/16/2021    Cellulitis of left lower leg    Cellulitis of left toe 06/30/2021    Acute paronychia of toe of left foot    Cellulitis of right lower limb 03/08/2021    Cellulitis of right lower extremity    Central retinal vein occlusion, left eye, stable 03/21/2018    Central retinal vein occlusion of left eye    Cramp and spasm 08/16/2021    Cramps, extremity    Encounter for immunization 03/21/2018    Immunization due    Encounter for immunization 03/21/2018    Need for pneumococcal vaccination    Other conditions influencing health status 03/21/2018    Uncontrolled diabetes mellitus    Other injury of unspecified body region, initial encounter 12/15/2017    Bruising    Other injury of unspecified body region, initial encounter 01/10/2019    Surgical wound present    Other visual disturbances 03/21/2018    Blurry vision    Personal history of diseases of the skin and subcutaneous tissue 11/24/2020    History of skin pruritus    Personal history of other diseases of the respiratory system 07/05/2019    History of sore throat    Personal  history of other medical treatment     H/O Doppler ultrasound    Personal history of other specified conditions 06/03/2020    History of headache    Personal history of other specified conditions 07/07/2020    History of flank pain    Personal history of other specified conditions 09/06/2019    History of fatigue    Personal history of other specified conditions 07/22/2020    History of wheezing    Unspecified fall, initial encounter 06/04/2018    Fall    Vitamin D deficiency, unspecified 06/04/2018    Mild vitamin D deficiency     Past Surgical History:   Procedure Laterality Date    CT ANGIO NECK  10/24/2023    CT NECK ANGIO W AND WO IV CONTRAST 10/24/2023    OTHER SURGICAL HISTORY  05/10/2022    Lumpectomy    OTHER SURGICAL HISTORY  01/27/2022    Nephrectomy partial     Family History   Problem Relation Name Age of Onset    Diabetes Other multiple Family Members      Social History     Tobacco Use    Smoking status: Former     Types: Cigarettes    Smokeless tobacco: Never   Substance Use Topics    Alcohol use: Never    Drug use: Not Currently     Types: Marijuana       Physical Exam   ED Triage Vitals   Temp Pulse Resp BP   -- -- -- --      SpO2 Temp src Heart Rate Source Patient Position   -- -- -- --      BP Location FiO2 (%)     -- --       Physical Exam  Vitals and nursing note reviewed.   Constitutional:       General: She is not in acute distress.     Appearance: She is well-developed.   HENT:      Head: Normocephalic and atraumatic.   Eyes:      Conjunctiva/sclera: Conjunctivae normal.   Cardiovascular:      Rate and Rhythm: Normal rate and regular rhythm.      Heart sounds: No murmur heard.  Pulmonary:      Effort: Pulmonary effort is normal. No respiratory distress.      Breath sounds: Normal breath sounds.   Abdominal:      Palpations: Abdomen is soft.      Tenderness: There is no abdominal tenderness.   Musculoskeletal:         General: No swelling.      Cervical back: Neck supple.   Skin:     General:  Skin is warm and dry.      Capillary Refill: Capillary refill takes less than 2 seconds.   Neurological:      Mental Status: She is alert.   Psychiatric:         Mood and Affect: Mood normal.         ED Course & MDM   ED Course as of 02/28/24 1354   Fri Feb 23, 2024   1629 EKG interpreted by me. Sinus. 71. No acute ischemic changes. No ST elevation. [CD]   2011 Reevaluated patient.  Abdomen soft nontender.  Discussed findings.  Patient agrees to follow-up as outpatient. [JA]      ED Course User Index  [CD] Jovani No MD  [JA] Merrick Pérez,          Diagnoses as of 02/28/24 1354   Lower abdominal pain   Urinary tract infection with hematuria, site unspecified       Medical Decision Making  I ordered labs, 4 mg IV Zofran, 4 mg IV morphine, 1 L normal saline IV. Care turned over to oncoming physician.        Procedure  Procedures     Jovani No MD  02/28/24 9817

## 2024-02-26 LAB
BACTERIA UR CULT: ABNORMAL
BACTERIA UR CULT: ABNORMAL

## 2024-02-28 PROCEDURE — 93005 ELECTROCARDIOGRAM TRACING: CPT

## 2024-02-29 ENCOUNTER — OFFICE VISIT (OUTPATIENT)
Dept: PRIMARY CARE | Facility: CLINIC | Age: 74
End: 2024-02-29
Payer: COMMERCIAL

## 2024-02-29 VITALS
WEIGHT: 184.1 LBS | DIASTOLIC BLOOD PRESSURE: 67 MMHG | TEMPERATURE: 97.1 F | OXYGEN SATURATION: 96 % | BODY MASS INDEX: 34.76 KG/M2 | HEART RATE: 73 BPM | SYSTOLIC BLOOD PRESSURE: 113 MMHG | HEIGHT: 61 IN

## 2024-02-29 DIAGNOSIS — M79.605 BILATERAL LEG PAIN: ICD-10-CM

## 2024-02-29 DIAGNOSIS — F32.A DEPRESSION, UNSPECIFIED DEPRESSION TYPE: Primary | ICD-10-CM

## 2024-02-29 DIAGNOSIS — Z79.4 TYPE 2 DIABETES MELLITUS WITH DIABETIC POLYNEUROPATHY, WITH LONG-TERM CURRENT USE OF INSULIN (MULTI): ICD-10-CM

## 2024-02-29 DIAGNOSIS — E11.42 TYPE 2 DIABETES MELLITUS WITH DIABETIC POLYNEUROPATHY, WITH LONG-TERM CURRENT USE OF INSULIN (MULTI): ICD-10-CM

## 2024-02-29 DIAGNOSIS — K59.00 CONSTIPATION, UNSPECIFIED CONSTIPATION TYPE: ICD-10-CM

## 2024-02-29 DIAGNOSIS — G45.9 TIA (TRANSIENT ISCHEMIC ATTACK): ICD-10-CM

## 2024-02-29 DIAGNOSIS — M79.604 BILATERAL LEG PAIN: ICD-10-CM

## 2024-02-29 DIAGNOSIS — R15.9 INCONTINENCE OF FECES, UNSPECIFIED FECAL INCONTINENCE TYPE: ICD-10-CM

## 2024-02-29 DIAGNOSIS — H91.93 BILATERAL HEARING LOSS, UNSPECIFIED HEARING LOSS TYPE: ICD-10-CM

## 2024-02-29 PROCEDURE — 3074F SYST BP LT 130 MM HG: CPT

## 2024-02-29 PROCEDURE — 1125F AMNT PAIN NOTED PAIN PRSNT: CPT

## 2024-02-29 PROCEDURE — 3078F DIAST BP <80 MM HG: CPT

## 2024-02-29 PROCEDURE — 1159F MED LIST DOCD IN RCRD: CPT

## 2024-02-29 PROCEDURE — 99214 OFFICE O/P EST MOD 30 MIN: CPT

## 2024-02-29 PROCEDURE — 1036F TOBACCO NON-USER: CPT

## 2024-02-29 RX ORDER — DIAPER,BRIEF,ADULT, DISPOSABLE
EACH MISCELLANEOUS
Refills: 0 | Status: CANCELLED | OUTPATIENT
Start: 2024-02-29

## 2024-02-29 RX ORDER — GABAPENTIN 800 MG/1
800 TABLET ORAL 2 TIMES DAILY
Qty: 90 CAPSULE | Refills: 3 | Status: SHIPPED | OUTPATIENT
Start: 2024-02-29 | End: 2024-08-27

## 2024-02-29 RX ORDER — SERTRALINE HYDROCHLORIDE 100 MG/1
200 TABLET, FILM COATED ORAL DAILY
Qty: 90 TABLET | Refills: 3 | Status: SHIPPED | OUTPATIENT
Start: 2024-02-29

## 2024-02-29 RX ORDER — ASPIRIN 81 MG/1
81 TABLET ORAL DAILY
Qty: 90 TABLET | Refills: 3 | Status: SHIPPED | OUTPATIENT
Start: 2024-02-29

## 2024-02-29 RX ORDER — PEN NEEDLE, DIABETIC 29 G X1/2"
NEEDLE, DISPOSABLE MISCELLANEOUS
Qty: 100 EACH | Refills: 11 | Status: SHIPPED | OUTPATIENT
Start: 2024-02-29

## 2024-02-29 RX ORDER — POLYETHYLENE GLYCOL 3350 17 G/17G
17 POWDER, FOR SOLUTION ORAL DAILY PRN
Qty: 510 G | Refills: 11 | Status: SHIPPED | OUTPATIENT
Start: 2024-02-29

## 2024-02-29 ASSESSMENT — PAIN SCALES - GENERAL: PAINLEVEL: 6

## 2024-02-29 NOTE — PROGRESS NOTES
Bilateral Ear irrigation was performed by me on Patient per Physician Order.Patient Inspection was performed,Bilateral Ear cleaned.Tolerated well.

## 2024-03-03 LAB
ATRIAL RATE: 71 BPM
P AXIS: 52 DEGREES
P OFFSET: 180 MS
P ONSET: 151 MS
PR INTERVAL: 156 MS
Q ONSET: 229 MS
QRS COUNT: 11 BEATS
QRS DURATION: 78 MS
QT INTERVAL: 434 MS
QTC CALCULATION(BAZETT): 471 MS
QTC FREDERICIA: 459 MS
R AXIS: -15 DEGREES
T AXIS: 24 DEGREES
T OFFSET: 446 MS
VENTRICULAR RATE: 71 BPM

## 2024-03-04 NOTE — PROGRESS NOTES
Subjective   Patient ID: Sara Santiago is a 73 y.o. female who presents for Follow-up (Refills ).    HPI  #ED/CDU follow-up   - admitted for abdominal pain/constipation, with concern for placement 2/26-2/27  - diagnosed with UTI; symptoms improved, continuing keflex 500 mg BID for 5 days total  - started on miralax PRN which helped with symptoms as well  - was prescribed short course bladder anti-spasmodic to help with pain   - denies current dysuria or hematuria    #Bilateral knee pain   #DM neuropathy  - not controlled with current gabapentin dose   - says she was previously on gabapentin 800 TID, but switched to 900 HS which did not help as much   - requesting 800 mg TID dosage as pain is not controlled during the day    #Hearing loss  - endorses difficulty hearing normal conversation  - denies ear pain or ringing  - feels she may have wax build-up    #Refills  - insulin pen needles  - diapers for incontinence  - aspirin 81 mg daily  - zoloft 200 mg daily    Patient Active Problem List   Diagnosis    Diabetes mellitus (CMS/HCC)    Decreased mobility    Diabetic mononeuropathy associated with type 2 diabetes mellitus (CMS/HCC)    Difficulty swallowing    DM2 (diabetes mellitus, type 2) (CMS/HCC)    Type 2 DM with CKD and hypertension    Cellulitis of buttock    Central retinal vein occlusion with macular edema of left eye    Chronic nonintractable headache    Chronic venous hypertension involving both sides    Chronic venous stasis    CKD (chronic kidney disease), stage III (CMS/HCC)    COPD (chronic obstructive pulmonary disease) (CMS/HCC)    Renal cell carcinoma of right kidney (CMS/HCC)    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)    Foot pain, bilateral    Left leg pain    Bilateral presbyopia    Breast cancer, right (CMS/HCC)    Breast mass, right    Breast skin changes    Cellulitis    Anxiety    Asthma    Astigmatism, bilateral    Axillary abscess    Benzodiazepine dependence (CMS/HCC)    Bilateral  edema of lower extremity    Bilateral leg pain    Left adrenal mass (CMS/HCC)    Hypotension    Hyperparathyroidism, secondary (CMS/HCC)    Pseudophakia of both eyes    Hypermetropia of both eyes    Rash    Petechial rash    Hypergranulation    Frail elderly    GERD (gastroesophageal reflux disease)    Hav (hallux abducto valgus), left    Hearing loss    Hypercholesteremia    Left knee pain    Left wrist pain    Liver lesion    Lower back pain    Obesity, Class III, BMI 40-49.9 (morbid obesity) (CMS/HCC)    Onychocryptosis    Swelling of lower leg    Shortness of breath    Seasonal allergies    PVD (posterior vitreous detachment), both eyes    Pressure ulcer    Peripheral neuropathy    PCO (posterior capsular opacification), bilateral    Osteoarthritis of knee    Thrombocytopenia (CMS/HCC)    Hypothyroidism, adult    Thyroid nodule    Varicose veins of both lower extremities with inflammation    Chronic venous stasis dermatitis of both lower extremities    Vasculitis of skin    Vasculitis (CMS/HCC)      Current Outpatient Medications   Medication Instructions    acetaminophen (TYLENOL) 1,000 mg, oral, Every 6 hours    albuterol 0.63 mg/3 mL nebulizer solution USE 1 UNIT DOSE IN NEBULIZER EVERY 4 TO 6 HOURS AS NEEDED.    aspirin 81 mg, oral, Daily    atorvastatin (Lipitor) 40 mg tablet oral, Daily RT    baclofen (Lioresal) 100 % powder powder     blood sugar diagnostic (True Metrix Glucose Test Strip) strip 3 times daily    calamine-zinc oxide 8-8 % lotion APPLY 2-3 TIMES DAILY TO AFFECTED AREA(S).    calcitriol (ROCALTROL) 0.25 mcg, oral, Daily    capsaicin (Zostrix) 0.025 % cream Topical, 2 times daily    cholecalciferol (VITAMIN D-3) 1,000 Units, oral, Daily    diaper,brief,adult,disposable misc USE AS NEEDED    diclofenac sodium 1 % kit apply to affected areas 2-3 times daily    emollient (Biafine) cream Emollient Base External Cream APPLY TO BOTH LEGS TWICE A DAY Quantity: 20 Refills: 3 Ordered: 27-Apr-2021  "Fredrick Green MD, Kika Start : 27-Apr-2021 Active    eucerin (Minerin Creme) cream APPLY TO BOTH LEGS TWICE A DAY    fluticasone (Flovent) 110 mcg/actuation inhaler 2 puffs, inhalation, 2 times daily RT, RINSE MOUTH AFTER USE.    gabapentin (NEURONTIN) 800 mg, oral, 2 times daily    guaiFENesin (Robitussin) 100 mg/5 mL syrup oral, Every 4 hours RT    Heartburn Relief (famotidine) 10 mg, oral, Nightly    insulin NPH and regular human (HumuLIN 70/30 U-100 KwikPen) 100 unit/mL (70-30) injection 25 Units, subcutaneous, 2 times daily with meals    ketoconazole (NIZORAL) 15 g, Every 12 hours    lancets 30 gauge misc USE AS DIRECTED.    latanoprost, PF, 0.005 % drops ophthalmic (eye)    levothyroxine (SYNTHROID, LEVOXYL) 25 mcg, oral, Every Day    lidocaine (Lidoderm) 5 % patch 1 patch, transdermal, Every Day    lidocaine 4 % cream Topical, 2 times daily PRN    loratadine (CLARITIN) 10 mg, oral, Daily PRN    methadone (Methadose) 40 mg dispersible tablet 2 tablets, oral, Daily    mometasone-formoterol (Dulera 200) 200-5 mcg/actuation inhaler inhalation, Every 12 hours    multivitamin tablet 1 tablet, oral, Daily    nystatin (MYCOSTATIN) 15 g, Every 12 hours    pen needle 1/2\" 29G X 12mm needle USE AS DIRECTED.    polyethylene glycol (MIRALAX) 17 g, oral, Daily PRN    sertraline (ZOLOFT) 200 mg, oral, Daily    traZODone (Desyrel) 50 mg tablet oral    white petrolatum 41 % ointment ointment 1 Application, Topical, 3 times daily      Past Surgical History:   Procedure Laterality Date    CT ANGIO NECK  10/24/2023    CT NECK ANGIO W AND WO IV CONTRAST 10/24/2023    OTHER SURGICAL HISTORY  05/10/2022    Lumpectomy    OTHER SURGICAL HISTORY  01/27/2022    Nephrectomy partial      Allergies   Allergen Reactions    Other Other and Unknown     Sulfa containing compounds    Sulfa (Sulfonamide Antibiotics) Unknown      Social History     Tobacco Use    Smoking status: Former     Types: Cigarettes    Smokeless tobacco: Never " "  Substance Use Topics    Alcohol use: Never    Drug use: Not Currently     Types: Marijuana      Family History   Problem Relation Name Age of Onset    Diabetes Other multiple Family Members         Review of Systems  Ten point review of systems negative unless specified in HPI.         Objective   Vitals: /67 (BP Location: Right arm, Patient Position: Sitting, BP Cuff Size: Adult long)   Pulse 73   Temp 36.2 °C (97.1 °F) (Temporal)   Ht 1.549 m (5' 1\")   Wt 83.5 kg (184 lb 1.6 oz)   SpO2 96%   BMI 34.79 kg/m²      Physical Exam  General:  Well developed. Alert. No acute distress.  Skin:  Warm, dry. normal skin turgor. no rashes. no lesions.   Head: NCAT  EENT: MMM; bilateral impacted cerumen without evidence of erythema or bulging  Cardiovascular:  Regular rate and rhythm, normal S1 and S2, no gallops, no murmurs and no pericardial rub.  Pulmonary:  CTAB in all fields  Abdomen:  (+) BS. soft. non-tender. non-distended. no abdominal masses. no guarding or rigidity.  Neurologic:  grossly intact  Musculoskeletal: in motorized wheelchair  Psychiatric:  appropriate mood and affect    Assessment/Plan     Depression, unspecified depression type  -     refilled sertraline (Zoloft) 100 mg tablet; Take 2 tablets (200 mg) by mouth once daily.    Hx of TIA (transient ischemic attack)  -     refilled aspirin 81 mg EC tablet; Take 1 tablet (81 mg) by mouth once daily.    Type 2 diabetes mellitus with diabetic polyneuropathy, with long-term current use of insulin (CMS/[facility])  -     pen needle 1/2\" 29G X 12mm needle; USE AS DIRECTED.    Bilateral leg pain  -     will adjust gabapentin from 900 mg HS to 800 mg BID    Constipation, unspecified constipation type  -     polyethylene glycol (Miralax) 17 gram/dose powder; Take 17 g by mouth once daily as needed (constipation).    Bilateral hearing loss, unspecified hearing loss type  -     Referral to Audiology; Future  - Bilateral ear irrigation done in " office    Incontinence of feces, unspecified fecal incontinence type  -     diaper,brief,adult,disposable misc; USE AS NEEDED      Attending Supervision: seen and discussed with attending physician Dr. Mohamud Martin.  Sylvester Mazariegos MD  PGY-2, Family Medicine.

## 2024-05-12 ENCOUNTER — HOSPITAL ENCOUNTER (INPATIENT)
Facility: HOSPITAL | Age: 74
LOS: 2 days | Discharge: ANOTHER HOSPITAL | End: 2024-05-16
Attending: EMERGENCY MEDICINE | Admitting: INTERNAL MEDICINE
Payer: COMMERCIAL

## 2024-05-12 DIAGNOSIS — L03.319 CELLULITIS OF TRUNK, UNSPECIFIED SITE OF TRUNK: ICD-10-CM

## 2024-05-12 DIAGNOSIS — T85.598A OBSTRUCTION OF FEEDING TUBE, INITIAL ENCOUNTER: Primary | ICD-10-CM

## 2024-05-12 PROCEDURE — 99285 EMERGENCY DEPT VISIT HI MDM: CPT

## 2024-05-12 RX ORDER — SODIUM CHLORIDE 9 MG/ML
75 INJECTION, SOLUTION INTRAVENOUS CONTINUOUS
Status: DISCONTINUED | OUTPATIENT
Start: 2024-05-12 | End: 2024-05-14

## 2024-05-13 LAB
ALBUMIN SERPL-MCNC: 2.4 G/DL (ref 3.5–5)
ALBUMIN SERPL-MCNC: 2.6 G/DL (ref 3.5–5)
ALP BLD-CCNC: 158 U/L (ref 35–125)
ALP BLD-CCNC: 178 U/L (ref 35–125)
ALT SERPL-CCNC: 11 U/L (ref 5–40)
ALT SERPL-CCNC: 13 U/L (ref 5–40)
AMORPH CRY #/AREA UR COMP ASSIST: ABNORMAL /HPF
ANION GAP SERPL CALC-SCNC: 14 MMOL/L
ANION GAP SERPL CALC-SCNC: 14 MMOL/L
APPEARANCE UR: ABNORMAL
AST SERPL-CCNC: 21 U/L (ref 5–40)
AST SERPL-CCNC: 30 U/L (ref 5–40)
BASOPHILS # BLD AUTO: 0.04 X10*3/UL (ref 0–0.1)
BASOPHILS NFR BLD AUTO: 0.6 %
BILIRUB SERPL-MCNC: 0.3 MG/DL (ref 0.1–1.2)
BILIRUB SERPL-MCNC: 0.4 MG/DL (ref 0.1–1.2)
BILIRUB UR STRIP.AUTO-MCNC: NEGATIVE MG/DL
BUN SERPL-MCNC: 40 MG/DL (ref 8–25)
BUN SERPL-MCNC: 42 MG/DL (ref 8–25)
CALCIUM SERPL-MCNC: 9.2 MG/DL (ref 8.5–10.4)
CALCIUM SERPL-MCNC: 9.6 MG/DL (ref 8.5–10.4)
CHLORIDE SERPL-SCNC: 105 MMOL/L (ref 97–107)
CHLORIDE SERPL-SCNC: 106 MMOL/L (ref 97–107)
CO2 SERPL-SCNC: 16 MMOL/L (ref 24–31)
CO2 SERPL-SCNC: 17 MMOL/L (ref 24–31)
COLOR UR: ABNORMAL
CREAT SERPL-MCNC: 1.7 MG/DL (ref 0.4–1.6)
CREAT SERPL-MCNC: 1.8 MG/DL (ref 0.4–1.6)
EGFRCR SERPLBLD CKD-EPI 2021: 29 ML/MIN/1.73M*2
EGFRCR SERPLBLD CKD-EPI 2021: 32 ML/MIN/1.73M*2
EOSINOPHIL # BLD AUTO: 0.16 X10*3/UL (ref 0–0.4)
EOSINOPHIL NFR BLD AUTO: 2.2 %
ERYTHROCYTE [DISTWIDTH] IN BLOOD BY AUTOMATED COUNT: 16.6 % (ref 11.5–14.5)
ERYTHROCYTE [DISTWIDTH] IN BLOOD BY AUTOMATED COUNT: 16.9 % (ref 11.5–14.5)
GLUCOSE BLD MANUAL STRIP-MCNC: 140 MG/DL (ref 74–99)
GLUCOSE BLD MANUAL STRIP-MCNC: 165 MG/DL (ref 74–99)
GLUCOSE BLD MANUAL STRIP-MCNC: 166 MG/DL (ref 74–99)
GLUCOSE BLD MANUAL STRIP-MCNC: 169 MG/DL (ref 74–99)
GLUCOSE BLD MANUAL STRIP-MCNC: 174 MG/DL (ref 74–99)
GLUCOSE SERPL-MCNC: 162 MG/DL (ref 65–99)
GLUCOSE SERPL-MCNC: 186 MG/DL (ref 65–99)
GLUCOSE UR STRIP.AUTO-MCNC: NORMAL MG/DL
HCT VFR BLD AUTO: 34.4 % (ref 36–46)
HCT VFR BLD AUTO: 38.9 % (ref 36–46)
HGB BLD-MCNC: 10.4 G/DL (ref 12–16)
HGB BLD-MCNC: 12 G/DL (ref 12–16)
HOLD SPECIMEN: NORMAL
IMM GRANULOCYTES # BLD AUTO: 0.02 X10*3/UL (ref 0–0.5)
IMM GRANULOCYTES NFR BLD AUTO: 0.3 % (ref 0–0.9)
KETONES UR STRIP.AUTO-MCNC: ABNORMAL MG/DL
LEUKOCYTE ESTERASE UR QL STRIP.AUTO: ABNORMAL
LYMPHOCYTES # BLD AUTO: 1.39 X10*3/UL (ref 0.8–3)
LYMPHOCYTES NFR BLD AUTO: 19.2 %
MCH RBC QN AUTO: 29.3 PG (ref 26–34)
MCH RBC QN AUTO: 30.2 PG (ref 26–34)
MCHC RBC AUTO-ENTMCNC: 30.2 G/DL (ref 32–36)
MCHC RBC AUTO-ENTMCNC: 30.8 G/DL (ref 32–36)
MCV RBC AUTO: 97 FL (ref 80–100)
MCV RBC AUTO: 98 FL (ref 80–100)
MONOCYTES # BLD AUTO: 0.52 X10*3/UL (ref 0.05–0.8)
MONOCYTES NFR BLD AUTO: 7.2 %
MUCOUS THREADS #/AREA URNS AUTO: ABNORMAL /LPF
NEUTROPHILS # BLD AUTO: 5.11 X10*3/UL (ref 1.6–5.5)
NEUTROPHILS NFR BLD AUTO: 70.5 %
NITRITE UR QL STRIP.AUTO: NEGATIVE
NRBC BLD-RTO: 0 /100 WBCS (ref 0–0)
NRBC BLD-RTO: 0 /100 WBCS (ref 0–0)
PH UR STRIP.AUTO: 6 [PH]
PLATELET # BLD AUTO: 102 X10*3/UL (ref 150–450)
PLATELET # BLD AUTO: 82 X10*3/UL (ref 150–450)
POTASSIUM SERPL-SCNC: 4 MMOL/L (ref 3.4–5.1)
POTASSIUM SERPL-SCNC: 5 MMOL/L (ref 3.4–5.1)
PROT SERPL-MCNC: 8.1 G/DL (ref 5.9–7.9)
PROT SERPL-MCNC: 8.8 G/DL (ref 5.9–7.9)
PROT UR STRIP.AUTO-MCNC: ABNORMAL MG/DL
RBC # BLD AUTO: 3.55 X10*6/UL (ref 4–5.2)
RBC # BLD AUTO: 3.98 X10*6/UL (ref 4–5.2)
RBC # UR STRIP.AUTO: ABNORMAL /UL
RBC #/AREA URNS AUTO: ABNORMAL /HPF
RBC MORPH BLD: NORMAL
SODIUM SERPL-SCNC: 136 MMOL/L (ref 133–145)
SODIUM SERPL-SCNC: 136 MMOL/L (ref 133–145)
SP GR UR STRIP.AUTO: 1.02
SQUAMOUS #/AREA URNS AUTO: ABNORMAL /HPF
UROBILINOGEN UR STRIP.AUTO-MCNC: NORMAL MG/DL
WBC # BLD AUTO: 5.4 X10*3/UL (ref 4.4–11.3)
WBC # BLD AUTO: 7.2 X10*3/UL (ref 4.4–11.3)
WBC #/AREA URNS AUTO: >50 /HPF
WBC CLUMPS #/AREA URNS AUTO: ABNORMAL /HPF

## 2024-05-13 PROCEDURE — G0378 HOSPITAL OBSERVATION PER HR: HCPCS

## 2024-05-13 PROCEDURE — 96375 TX/PRO/DX INJ NEW DRUG ADDON: CPT

## 2024-05-13 PROCEDURE — 96361 HYDRATE IV INFUSION ADD-ON: CPT

## 2024-05-13 PROCEDURE — 96366 THER/PROPH/DIAG IV INF ADDON: CPT

## 2024-05-13 PROCEDURE — 81003 URINALYSIS AUTO W/O SCOPE: CPT | Performed by: EMERGENCY MEDICINE

## 2024-05-13 PROCEDURE — 2500000004 HC RX 250 GENERAL PHARMACY W/ HCPCS (ALT 636 FOR OP/ED): Performed by: INTERNAL MEDICINE

## 2024-05-13 PROCEDURE — 36415 COLL VENOUS BLD VENIPUNCTURE: CPT | Performed by: EMERGENCY MEDICINE

## 2024-05-13 PROCEDURE — 2500000002 HC RX 250 W HCPCS SELF ADMINISTERED DRUGS (ALT 637 FOR MEDICARE OP, ALT 636 FOR OP/ED): Performed by: INTERNAL MEDICINE

## 2024-05-13 PROCEDURE — 84075 ASSAY ALKALINE PHOSPHATASE: CPT | Performed by: EMERGENCY MEDICINE

## 2024-05-13 PROCEDURE — 85025 COMPLETE CBC W/AUTO DIFF WBC: CPT | Performed by: EMERGENCY MEDICINE

## 2024-05-13 PROCEDURE — 2500000004 HC RX 250 GENERAL PHARMACY W/ HCPCS (ALT 636 FOR OP/ED): Performed by: EMERGENCY MEDICINE

## 2024-05-13 PROCEDURE — 96376 TX/PRO/DX INJ SAME DRUG ADON: CPT

## 2024-05-13 PROCEDURE — 96372 THER/PROPH/DIAG INJ SC/IM: CPT | Performed by: INTERNAL MEDICINE

## 2024-05-13 PROCEDURE — 92610 EVALUATE SWALLOWING FUNCTION: CPT | Mod: GN

## 2024-05-13 PROCEDURE — 96365 THER/PROPH/DIAG IV INF INIT: CPT | Mod: 59

## 2024-05-13 PROCEDURE — 80053 COMPREHEN METABOLIC PANEL: CPT | Performed by: INTERNAL MEDICINE

## 2024-05-13 PROCEDURE — 82947 ASSAY GLUCOSE BLOOD QUANT: CPT

## 2024-05-13 PROCEDURE — 87086 URINE CULTURE/COLONY COUNT: CPT | Mod: WESLAB | Performed by: EMERGENCY MEDICINE

## 2024-05-13 PROCEDURE — 85027 COMPLETE CBC AUTOMATED: CPT | Performed by: INTERNAL MEDICINE

## 2024-05-13 RX ORDER — ONDANSETRON HYDROCHLORIDE 2 MG/ML
4 INJECTION, SOLUTION INTRAVENOUS EVERY 4 HOURS PRN
Status: DISCONTINUED | OUTPATIENT
Start: 2024-05-13 | End: 2024-05-16 | Stop reason: HOSPADM

## 2024-05-13 RX ORDER — DEXTROSE 50 % IN WATER (D50W) INTRAVENOUS SYRINGE
25
Status: DISCONTINUED | OUTPATIENT
Start: 2024-05-13 | End: 2024-05-16 | Stop reason: HOSPADM

## 2024-05-13 RX ORDER — CEFEPIME 1 G/50ML
2 INJECTION, SOLUTION INTRAVENOUS EVERY 8 HOURS
COMMUNITY
Start: 2024-05-03 | End: 2024-05-29

## 2024-05-13 RX ORDER — IPRATROPIUM BROMIDE AND ALBUTEROL SULFATE 2.5; .5 MG/3ML; MG/3ML
3 SOLUTION RESPIRATORY (INHALATION) EVERY 6 HOURS PRN
Status: DISCONTINUED | OUTPATIENT
Start: 2024-05-13 | End: 2024-05-16 | Stop reason: HOSPADM

## 2024-05-13 RX ORDER — HEPARIN SODIUM 5000 [USP'U]/ML
5000 INJECTION, SOLUTION INTRAVENOUS; SUBCUTANEOUS EVERY 8 HOURS
Status: DISCONTINUED | OUTPATIENT
Start: 2024-05-13 | End: 2024-05-16 | Stop reason: HOSPADM

## 2024-05-13 RX ORDER — METHOCARBAMOL 750 MG/1
750 TABLET, FILM COATED ORAL
COMMUNITY

## 2024-05-13 RX ORDER — HYDRALAZINE HYDROCHLORIDE 20 MG/ML
10 INJECTION INTRAMUSCULAR; INTRAVENOUS EVERY 4 HOURS PRN
Status: DISCONTINUED | OUTPATIENT
Start: 2024-05-13 | End: 2024-05-14

## 2024-05-13 RX ORDER — FLUCONAZOLE 100 MG/1
100 TABLET ORAL DAILY
COMMUNITY
Start: 2024-05-09 | End: 2024-05-20

## 2024-05-13 RX ORDER — METHADONE HYDROCHLORIDE 10 MG/5ML
80 SOLUTION ORAL DAILY
COMMUNITY

## 2024-05-13 RX ORDER — SENNOSIDES 8.6 MG/1
1 TABLET ORAL DAILY
COMMUNITY

## 2024-05-13 RX ORDER — DEXTROSE 50 % IN WATER (D50W) INTRAVENOUS SYRINGE
12.5
Status: DISCONTINUED | OUTPATIENT
Start: 2024-05-13 | End: 2024-05-16 | Stop reason: HOSPADM

## 2024-05-13 RX ORDER — INSULIN LISPRO 100 [IU]/ML
0-10 INJECTION, SOLUTION INTRAVENOUS; SUBCUTANEOUS
Status: DISCONTINUED | OUTPATIENT
Start: 2024-05-13 | End: 2024-05-16 | Stop reason: HOSPADM

## 2024-05-13 RX ORDER — INSULIN GLARGINE 100 [IU]/ML
17 INJECTION, SOLUTION SUBCUTANEOUS DAILY
COMMUNITY

## 2024-05-13 RX ORDER — IPRATROPIUM BROMIDE AND ALBUTEROL SULFATE 2.5; .5 MG/3ML; MG/3ML
3 SOLUTION RESPIRATORY (INHALATION)
Status: DISCONTINUED | OUTPATIENT
Start: 2024-05-13 | End: 2024-05-13

## 2024-05-13 RX ORDER — ONDANSETRON 4 MG/1
4 TABLET, ORALLY DISINTEGRATING ORAL EVERY 6 HOURS PRN
COMMUNITY

## 2024-05-13 RX ORDER — MORPHINE SULFATE 4 MG/ML
4 INJECTION, SOLUTION INTRAMUSCULAR; INTRAVENOUS
Status: DISCONTINUED | OUTPATIENT
Start: 2024-05-13 | End: 2024-05-16 | Stop reason: HOSPADM

## 2024-05-13 RX ORDER — CETIRIZINE HYDROCHLORIDE 10 MG/1
5 TABLET ORAL DAILY
COMMUNITY

## 2024-05-13 RX ORDER — INSULIN LISPRO 100 [IU]/ML
INJECTION, SOLUTION INTRAVENOUS; SUBCUTANEOUS
COMMUNITY

## 2024-05-13 RX ORDER — MIRTAZAPINE 7.5 MG/1
7.5 TABLET, FILM COATED ORAL NIGHTLY
COMMUNITY

## 2024-05-13 RX ORDER — AMINO ACIDS/PROTEIN HYDROLYS 15G-100/30
LIQUID (ML) ORAL
COMMUNITY
End: 2024-06-04

## 2024-05-13 RX ORDER — GABAPENTIN 400 MG/1
400 CAPSULE ORAL 2 TIMES DAILY
COMMUNITY

## 2024-05-13 RX ORDER — BISACODYL 10 MG/1
SUPPOSITORY RECTAL DAILY PRN
COMMUNITY

## 2024-05-13 RX ORDER — DEXTROSE MONOHYDRATE 50 MG/ML
50 INJECTION, SOLUTION INTRAVENOUS CONTINUOUS
Status: DISCONTINUED | OUTPATIENT
Start: 2024-05-13 | End: 2024-05-14

## 2024-05-13 RX ORDER — CEFEPIME 1 G/50ML
2 INJECTION, SOLUTION INTRAVENOUS EVERY 8 HOURS
Status: DISCONTINUED | OUTPATIENT
Start: 2024-05-13 | End: 2024-05-14

## 2024-05-13 RX ORDER — DOCUSATE SODIUM 100 MG/1
1 CAPSULE, LIQUID FILLED ORAL 2 TIMES DAILY
COMMUNITY

## 2024-05-13 RX ORDER — FLUTICASONE PROPIONATE AND SALMETEROL 500; 50 UG/1; UG/1
1 POWDER RESPIRATORY (INHALATION) EVERY 12 HOURS
COMMUNITY

## 2024-05-13 RX ORDER — HYDROMORPHONE HYDROCHLORIDE 1 MG/ML
1 INJECTION, SOLUTION INTRAMUSCULAR; INTRAVENOUS; SUBCUTANEOUS EVERY 6 HOURS
Status: DISCONTINUED | OUTPATIENT
Start: 2024-05-13 | End: 2024-05-14

## 2024-05-13 RX ORDER — SIMETHICONE 80 MG
80 TABLET,CHEWABLE ORAL EVERY 6 HOURS PRN
COMMUNITY

## 2024-05-13 RX ORDER — LATANOPROST 50 UG/ML
1 SOLUTION/ DROPS OPHTHALMIC NIGHTLY
COMMUNITY

## 2024-05-13 RX ADMIN — CEFEPIME 2 G: 2 INJECTION, POWDER, FOR SOLUTION INTRAVENOUS at 18:21

## 2024-05-13 RX ADMIN — INSULIN LISPRO 2 UNITS: 100 INJECTION, SOLUTION INTRAVENOUS; SUBCUTANEOUS at 18:25

## 2024-05-13 RX ADMIN — SODIUM CHLORIDE 125 ML/HR: 900 INJECTION, SOLUTION INTRAVENOUS at 08:30

## 2024-05-13 RX ADMIN — HEPARIN SODIUM 5000 UNITS: 5000 INJECTION, SOLUTION INTRAVENOUS; SUBCUTANEOUS at 06:52

## 2024-05-13 RX ADMIN — SODIUM CHLORIDE 125 ML/HR: 900 INJECTION, SOLUTION INTRAVENOUS at 01:04

## 2024-05-13 RX ADMIN — HEPARIN SODIUM 5000 UNITS: 5000 INJECTION, SOLUTION INTRAVENOUS; SUBCUTANEOUS at 14:00

## 2024-05-13 RX ADMIN — HYDROMORPHONE HYDROCHLORIDE 1 MG: 1 INJECTION, SOLUTION INTRAMUSCULAR; INTRAVENOUS; SUBCUTANEOUS at 12:09

## 2024-05-13 RX ADMIN — CEFEPIME 2 G: 2 INJECTION, POWDER, FOR SOLUTION INTRAVENOUS at 08:27

## 2024-05-13 RX ADMIN — HEPARIN SODIUM 5000 UNITS: 5000 INJECTION, SOLUTION INTRAVENOUS; SUBCUTANEOUS at 23:08

## 2024-05-13 RX ADMIN — DEXTROSE MONOHYDRATE 50 ML/HR: 5 INJECTION, SOLUTION INTRAVENOUS at 22:28

## 2024-05-13 RX ADMIN — HYDROMORPHONE HYDROCHLORIDE 1 MG: 1 INJECTION, SOLUTION INTRAMUSCULAR; INTRAVENOUS; SUBCUTANEOUS at 06:53

## 2024-05-13 RX ADMIN — CEFEPIME 2 G: 2 INJECTION, POWDER, FOR SOLUTION INTRAVENOUS at 01:00

## 2024-05-13 SDOH — ECONOMIC STABILITY: INCOME INSECURITY: IN THE LAST 12 MONTHS, WAS THERE A TIME WHEN YOU WERE NOT ABLE TO PAY THE MORTGAGE OR RENT ON TIME?: NO

## 2024-05-13 SDOH — SOCIAL STABILITY: SOCIAL NETWORK: HOW OFTEN DO YOU ATTEND CHURCH OR RELIGIOUS SERVICES?: NEVER

## 2024-05-13 SDOH — SOCIAL STABILITY: SOCIAL NETWORK
IN A TYPICAL WEEK, HOW MANY TIMES DO YOU TALK ON THE PHONE WITH FAMILY, FRIENDS, OR NEIGHBORS?: MORE THAN THREE TIMES A WEEK

## 2024-05-13 SDOH — SOCIAL STABILITY: SOCIAL NETWORK: HOW OFTEN DO YOU ATTENT MEETINGS OF THE CLUB OR ORGANIZATION YOU BELONG TO?: NEVER

## 2024-05-13 SDOH — ECONOMIC STABILITY: HOUSING INSECURITY: IN THE LAST 12 MONTHS, HOW MANY PLACES HAVE YOU LIVED?: 1

## 2024-05-13 SDOH — SOCIAL STABILITY: SOCIAL INSECURITY: ARE YOU OR HAVE YOU BEEN THREATENED OR ABUSED PHYSICALLY, EMOTIONALLY, OR SEXUALLY BY ANYONE?: NO

## 2024-05-13 SDOH — ECONOMIC STABILITY: FOOD INSECURITY: WITHIN THE PAST 12 MONTHS, YOU WORRIED THAT YOUR FOOD WOULD RUN OUT BEFORE YOU GOT MONEY TO BUY MORE.: NEVER TRUE

## 2024-05-13 SDOH — SOCIAL STABILITY: SOCIAL NETWORK: HOW OFTEN DO YOU GET TOGETHER WITH FRIENDS OR RELATIVES?: MORE THAN THREE TIMES A WEEK

## 2024-05-13 SDOH — SOCIAL STABILITY: SOCIAL NETWORK: ARE YOU MARRIED, WIDOWED, DIVORCED, SEPARATED, NEVER MARRIED, OR LIVING WITH A PARTNER?: WIDOWED

## 2024-05-13 SDOH — ECONOMIC STABILITY: TRANSPORTATION INSECURITY
IN THE PAST 12 MONTHS, HAS THE LACK OF TRANSPORTATION KEPT YOU FROM MEDICAL APPOINTMENTS OR FROM GETTING MEDICATIONS?: NO

## 2024-05-13 SDOH — SOCIAL STABILITY: SOCIAL INSECURITY
WITHIN THE LAST YEAR, HAVE YOU BEEN KICKED, HIT, SLAPPED, OR OTHERWISE PHYSICALLY HURT BY YOUR PARTNER OR EX-PARTNER?: NO

## 2024-05-13 SDOH — HEALTH STABILITY: MENTAL HEALTH: HOW OFTEN DO YOU HAVE A DRINK CONTAINING ALCOHOL?: NEVER

## 2024-05-13 SDOH — SOCIAL STABILITY: SOCIAL INSECURITY
WITHIN THE LAST YEAR, HAVE TO BEEN RAPED OR FORCED TO HAVE ANY KIND OF SEXUAL ACTIVITY BY YOUR PARTNER OR EX-PARTNER?: NO

## 2024-05-13 SDOH — SOCIAL STABILITY: SOCIAL INSECURITY: DO YOU FEEL ANYONE HAS EXPLOITED OR TAKEN ADVANTAGE OF YOU FINANCIALLY OR OF YOUR PERSONAL PROPERTY?: NO

## 2024-05-13 SDOH — ECONOMIC STABILITY: HOUSING INSECURITY
IN THE LAST 12 MONTHS, WAS THERE A TIME WHEN YOU DID NOT HAVE A STEADY PLACE TO SLEEP OR SLEPT IN A SHELTER (INCLUDING NOW)?: NO

## 2024-05-13 SDOH — HEALTH STABILITY: MENTAL HEALTH
STRESS IS WHEN SOMEONE FEELS TENSE, NERVOUS, ANXIOUS, OR CAN'T SLEEP AT NIGHT BECAUSE THEIR MIND IS TROUBLED. HOW STRESSED ARE YOU?: NOT AT ALL

## 2024-05-13 SDOH — ECONOMIC STABILITY: INCOME INSECURITY: IN THE PAST 12 MONTHS, HAS THE ELECTRIC, GAS, OIL, OR WATER COMPANY THREATENED TO SHUT OFF SERVICE IN YOUR HOME?: NO

## 2024-05-13 SDOH — HEALTH STABILITY: MENTAL HEALTH
HOW OFTEN DO YOU NEED TO HAVE SOMEONE HELP YOU WHEN YOU READ INSTRUCTIONS, PAMPHLETS, OR OTHER WRITTEN MATERIAL FROM YOUR DOCTOR OR PHARMACY?: OFTEN

## 2024-05-13 SDOH — SOCIAL STABILITY: SOCIAL INSECURITY: DO YOU FEEL UNSAFE GOING BACK TO THE PLACE WHERE YOU ARE LIVING?: NO

## 2024-05-13 SDOH — ECONOMIC STABILITY: TRANSPORTATION INSECURITY
IN THE PAST 12 MONTHS, HAS LACK OF TRANSPORTATION KEPT YOU FROM MEETINGS, WORK, OR FROM GETTING THINGS NEEDED FOR DAILY LIVING?: NO

## 2024-05-13 SDOH — SOCIAL STABILITY: SOCIAL INSECURITY: WERE YOU ABLE TO COMPLETE ALL THE BEHAVIORAL HEALTH SCREENINGS?: NO

## 2024-05-13 SDOH — HEALTH STABILITY: MENTAL HEALTH: HOW OFTEN DO YOU HAVE 6 OR MORE DRINKS ON ONE OCCASION?: NEVER

## 2024-05-13 SDOH — ECONOMIC STABILITY: INCOME INSECURITY: HOW HARD IS IT FOR YOU TO PAY FOR THE VERY BASICS LIKE FOOD, HOUSING, MEDICAL CARE, AND HEATING?: NOT HARD AT ALL

## 2024-05-13 SDOH — SOCIAL STABILITY: SOCIAL INSECURITY: ARE THERE ANY APPARENT SIGNS OF INJURIES/BEHAVIORS THAT COULD BE RELATED TO ABUSE/NEGLECT?: NO

## 2024-05-13 SDOH — SOCIAL STABILITY: SOCIAL INSECURITY: WITHIN THE LAST YEAR, HAVE YOU BEEN AFRAID OF YOUR PARTNER OR EX-PARTNER?: NO

## 2024-05-13 SDOH — SOCIAL STABILITY: SOCIAL NETWORK
DO YOU BELONG TO ANY CLUBS OR ORGANIZATIONS SUCH AS CHURCH GROUPS UNIONS, FRATERNAL OR ATHLETIC GROUPS, OR SCHOOL GROUPS?: NO

## 2024-05-13 SDOH — ECONOMIC STABILITY: FOOD INSECURITY: WITHIN THE PAST 12 MONTHS, THE FOOD YOU BOUGHT JUST DIDN'T LAST AND YOU DIDN'T HAVE MONEY TO GET MORE.: NEVER TRUE

## 2024-05-13 SDOH — HEALTH STABILITY: PHYSICAL HEALTH: ON AVERAGE, HOW MANY DAYS PER WEEK DO YOU ENGAGE IN MODERATE TO STRENUOUS EXERCISE (LIKE A BRISK WALK)?: 0 DAYS

## 2024-05-13 SDOH — HEALTH STABILITY: MENTAL HEALTH: HOW MANY STANDARD DRINKS CONTAINING ALCOHOL DO YOU HAVE ON A TYPICAL DAY?: PATIENT DOES NOT DRINK

## 2024-05-13 SDOH — SOCIAL STABILITY: SOCIAL INSECURITY: WITHIN THE LAST YEAR, HAVE YOU BEEN HUMILIATED OR EMOTIONALLY ABUSED IN OTHER WAYS BY YOUR PARTNER OR EX-PARTNER?: NO

## 2024-05-13 SDOH — HEALTH STABILITY: PHYSICAL HEALTH: ON AVERAGE, HOW MANY MINUTES DO YOU ENGAGE IN EXERCISE AT THIS LEVEL?: 0 MIN

## 2024-05-13 SDOH — SOCIAL STABILITY: SOCIAL INSECURITY: HAVE YOU HAD ANY THOUGHTS OF HARMING ANYONE ELSE?: NO

## 2024-05-13 SDOH — SOCIAL STABILITY: SOCIAL INSECURITY: DOES ANYONE TRY TO KEEP YOU FROM HAVING/CONTACTING OTHER FRIENDS OR DOING THINGS OUTSIDE YOUR HOME?: NO

## 2024-05-13 SDOH — SOCIAL STABILITY: SOCIAL INSECURITY: HAVE YOU HAD THOUGHTS OF HARMING ANYONE ELSE?: NO

## 2024-05-13 SDOH — SOCIAL STABILITY: SOCIAL INSECURITY: HAS ANYONE EVER THREATENED TO HURT YOUR FAMILY OR YOUR PETS?: NO

## 2024-05-13 SDOH — SOCIAL STABILITY: SOCIAL INSECURITY: ABUSE: ADULT

## 2024-05-13 ASSESSMENT — LIFESTYLE VARIABLES
SKIP TO QUESTIONS 9-10: 0
TOTAL SCORE: 0
EVER FELT BAD OR GUILTY ABOUT YOUR DRINKING: NO
SKIP TO QUESTIONS 9-10: 1
EVER HAD A DRINK FIRST THING IN THE MORNING TO STEADY YOUR NERVES TO GET RID OF A HANGOVER: NO
HAVE PEOPLE ANNOYED YOU BY CRITICIZING YOUR DRINKING: NO
HAVE YOU EVER FELT YOU SHOULD CUT DOWN ON YOUR DRINKING: NO
HOW OFTEN DO YOU HAVE A DRINK CONTAINING ALCOHOL: PATIENT UNABLE TO ANSWER
AUDIT-C TOTAL SCORE: -1
HOW MANY STANDARD DRINKS CONTAINING ALCOHOL DO YOU HAVE ON A TYPICAL DAY: PATIENT UNABLE TO ANSWER
AUDIT-C TOTAL SCORE: 0
AUDIT-C TOTAL SCORE: -1
HOW OFTEN DO YOU HAVE 6 OR MORE DRINKS ON ONE OCCASION: PATIENT UNABLE TO ANSWER

## 2024-05-13 ASSESSMENT — COLUMBIA-SUICIDE SEVERITY RATING SCALE - C-SSRS
2. HAVE YOU ACTUALLY HAD ANY THOUGHTS OF KILLING YOURSELF?: NO
6. HAVE YOU EVER DONE ANYTHING, STARTED TO DO ANYTHING, OR PREPARED TO DO ANYTHING TO END YOUR LIFE?: NO
1. IN THE PAST MONTH, HAVE YOU WISHED YOU WERE DEAD OR WISHED YOU COULD GO TO SLEEP AND NOT WAKE UP?: NO

## 2024-05-13 ASSESSMENT — PAIN SCALES - GENERAL
PAINLEVEL_OUTOF10: 0 - NO PAIN

## 2024-05-13 ASSESSMENT — ACTIVITIES OF DAILY LIVING (ADL)
DRESSING YOURSELF: DEPENDENT
BATHING: DEPENDENT
GROOMING: DEPENDENT
FEEDING YOURSELF: NEEDS ASSISTANCE
HEARING - RIGHT EAR: FUNCTIONAL
HEARING - LEFT EAR: FUNCTIONAL
ADEQUATE_TO_COMPLETE_ADL: YES
JUDGMENT_ADEQUATE_SAFELY_COMPLETE_DAILY_ACTIVITIES: NO
PATIENT'S MEMORY ADEQUATE TO SAFELY COMPLETE DAILY ACTIVITIES?: NO
ASSISTIVE_DEVICE: WHEELCHAIR
WALKS IN HOME: DEPENDENT
TOILETING: DEPENDENT

## 2024-05-13 ASSESSMENT — COGNITIVE AND FUNCTIONAL STATUS - GENERAL
MOVING FROM LYING ON BACK TO SITTING ON SIDE OF FLAT BED WITH BEDRAILS: TOTAL
PERSONAL GROOMING: TOTAL
TOILETING: TOTAL
DAILY ACTIVITIY SCORE: 6
TURNING FROM BACK TO SIDE WHILE IN FLAT BAD: TOTAL
MOBILITY SCORE: 6
HELP NEEDED FOR BATHING: TOTAL
DRESSING REGULAR UPPER BODY CLOTHING: TOTAL
PATIENT BASELINE BEDBOUND: UNABLE TO ASSESS AT THIS TIME
STANDING UP FROM CHAIR USING ARMS: TOTAL
WALKING IN HOSPITAL ROOM: TOTAL
CLIMB 3 TO 5 STEPS WITH RAILING: TOTAL
MOVING TO AND FROM BED TO CHAIR: TOTAL
EATING MEALS: TOTAL
DRESSING REGULAR LOWER BODY CLOTHING: TOTAL

## 2024-05-13 ASSESSMENT — PATIENT HEALTH QUESTIONNAIRE - PHQ9
SUM OF ALL RESPONSES TO PHQ9 QUESTIONS 1 & 2: 2
1. LITTLE INTEREST OR PLEASURE IN DOING THINGS: SEVERAL DAYS
2. FEELING DOWN, DEPRESSED OR HOPELESS: SEVERAL DAYS

## 2024-05-13 ASSESSMENT — PAIN - FUNCTIONAL ASSESSMENT
PAIN_FUNCTIONAL_ASSESSMENT: 0-10

## 2024-05-13 NOTE — PROGRESS NOTES
Pharmacy Medication History Review    Sara Santiago is a 73 y.o. female admitted for Clogged feeding tube. Pharmacy reviewed the patient's [id number] medications and allergies for accuracy.    Medications ADDED:  Basaglar  Cefepime IV  Bisacodyl suppository  Diff-stat  Advair diskus 500  Methadone liquid  Methocarbamol 750mg  Mirtazapine 7.5mg  Fluconozole 100mg  Senna 8.6mg  Simethicone   Gabapentin 400mg  Medications CHANGED:  Latanoprost   Tylenol 500mg  Aspirin 81mg  Cacitriol 0.25mcg  Diclofenac 1%  Multivitamin  Miralax  Petrolatum ointment  Sertraline 100mg  Medications REMOVED:   Gabapentin 800mg  Famotidine 10mg     The list below reflects the updated PTA list. Comments regarding how patient may be taking medications differently can be found in the Admit Orders Activity  Prior to Admission Medications   Prescriptions Last Dose Informant   Heartburn Relief, famotidine, 10 mg tablet Not Taking Other   Sig: TAKE 1 TABLET BY MOUTH EVERYDAY AT BEDTIME   Patient not taking: Reported on 5/13/2024   Sacch boulardi-Bacill coag-FOS (Diff-Stat) 471 mg capsule  Other   Sig: Take by mouth.   acetaminophen (Tylenol) 500 mg tablet  Other   Sig: Take 2 tablets (1,000 mg) by mouth every 6 hours.   aspirin 81 mg EC tablet  Other   Sig: Take 1 tablet (81 mg) by mouth once daily.   atorvastatin (Lipitor) 40 mg tablet  Other   Sig: Take by mouth once daily.   bisacodyl (Dulcolax) 10 mg suppository  Other   Sig: Insert into the rectum once daily as needed for constipation.   blood sugar diagnostic (True Metrix Glucose Test Strip) strip  Other   Sig: in the morning, at noon, and at bedtime.   calcitriol (Rocaltrol) 0.25 mcg capsule  Other   Sig: TAKE 1 CAPSULE BY MOUTH EVERY DAY   cefepime (Maxipime) IV  Other   Sig: Infuse 100 mL (2 g) into a venous catheter every 8 hours. For infection   cetirizine (ZyrTEC) 10 mg tablet  Other   Sig: Take 0.5 tablets (5 mg) by mouth once daily.   cholecalciferol (Vitamin D-3) 25 MCG  (1000 UT) tablet  Other   Sig: Take 1 tablet (1,000 Units) by mouth once daily.   diaper,brief,adult,disposable misc  Other   Sig: USE AS NEEDED   diclofenac sodium 1 % kit  Other   Sig: apply to affected areas 2-3 times daily   docusate sodium (Colace) 100 mg capsule  Other   Sig: Take 1 mg by mouth 2 times a day.   eucerin (Minerin Creme) cream  Other   Sig: APPLY TO BOTH LEGS TWICE A DAY   fluconazole (Diflucan) 100 mg tablet  Other   Sig: Take 1 tablet (100 mg) by mouth once daily. For 7 days   fluticasone (Flovent) 110 mcg/actuation inhaler  Other   Sig: Inhale 2 puffs 2 times a day. RINSE MOUTH AFTER USE.   fluticasone propion-salmeteroL (Advair Diskus) 500-50 mcg/dose diskus inhaler  Other   Sig: Inhale 1 puff every 12 hours. Rinse mouth with water after use to reduce aftertaste and incidence of candidiasis. Do not swallow.   gabapentin (Neurontin) 400 mg capsule  Other   Sig: Take 1 capsule (400 mg) by mouth 2 times a day.   gabapentin (Neurontin) 800 mg tablet Not Taking Other   Sig: Take 1 tablet (800 mg) by mouth 2 times a day.   Patient not taking: Reported on 5/13/2024   insulin glargine (Basaglar KwikPen U-100 Insulin) 100 unit/mL (3 mL) pen  Other   Sig: Inject 17 Units under the skin once daily. Take as directed per insulin instructions.   insulin lispro (HumaLOG) 100 unit/mL injection  Other   Sig: Inject under the skin 3 times a day with meals. Take as directed per insulin instructions.   lancets 30 gauge misc  Other   Sig: USE AS DIRECTED.   latanoprost (Xalatan) 0.005 % ophthalmic solution  Other   Sig: Administer 1 drop into both eyes once daily at bedtime.   levothyroxine (Synthroid, Levoxyl) 25 mcg tablet  Other   Sig: Take 1 tablet (25 mcg) by mouth once every day.   lidocaine 4 % cream  Other   Sig: Apply topically 2 times a day as needed for mild pain (1 - 3).   lidocaine 4 % patch  Other   Sig: Place 1 patch on the skin once every day.   loratadine (Claritin) 10 mg tablet  Other   Sig:  "TAKE 1 TABLET BY MOUTH EVERY DAY AS NEEDED   methadone (Dolophine) 10 mg/5 mL solution  Other   Sig: Take 80 mL (160 mg) by mouth once daily.   methocarbamol (Robaxin) 750 mg tablet  Other   Sig: Take 1 tablet (750 mg) by mouth every 6 hours during the day.   mirtazapine (Remeron) 7.5 mg tablet  Other   Sig: Take 1 tablet (7.5 mg) by mouth once daily at bedtime.   multivitamin tablet  Other   Sig: Take 1 tablet by mouth once daily.   ondansetron ODT (Zofran-ODT) 4 mg disintegrating tablet  Other   Sig: Take 1 tablet (4 mg) by mouth every 6 hours if needed for nausea or vomiting.   pen needle 1/2\" 29G X 12mm needle  Other   Sig: USE AS DIRECTED.   polyethylene glycol (Miralax) 17 gram/dose powder  Other   Sig: Take 17 g by mouth once daily as needed (constipation).   sennosides (Senokot) 8.6 mg tablet  Other   Sig: Take 1 tablet (8.6 mg) by mouth once daily.   sertraline (Zoloft) 100 mg tablet  Other   Sig: Take 2 tablets (200 mg) by mouth once daily.   Patient taking differently: Take 1 tablet (100 mg) by mouth once daily.   simethicone (Mylicon) 80 mg chewable tablet  Other   Sig: Chew 1 tablet (80 mg) every 6 hours if needed for flatulence.   traZODone (Desyrel) 50 mg tablet  Other   Sig: Take 1 tablet (50 mg) by mouth once daily at bedtime.   white petrolatum 41 % ointment ointment  Other   Sig: Apply 1 Application topically 3 times a day.      Facility-Administered Medications: None        The list below reflects the updated allergy list. Please review each documented allergy for additional clarification and justification.  Allergies  Reviewed by Regla Cisneros on 5/13/2024        Severity Reactions Comments    Other Not Specified Other, Unknown Sulfa containing compounds    Sulfa (sulfonamide Antibiotics) Not Specified Unknown             Pharmacy has been updated to none facility patient.    Sources used to complete the med history include facility medication list.    Below are additional concerns with the " patient's PTA list.  Several changes were made    Regla Cisneros  Please reach out via [employer] Secure Chat for questions

## 2024-05-13 NOTE — PROGRESS NOTES
Speech-Language Pathology    Inpatient Clinical Swallow Evaluation    Patient Name: Sara Santiago  MRN: [id number]  Today's Date: 5/13/2024   Time Calculation  Start Time: 1050  Stop Time: 1149  Time Calculation (min): 59 min          Current Problem:   1. Obstruction of feeding tube, initial encounter              Recommendations:  Recommendations  Additional Recommendations: Modified barium swallow study  Solid Diet Recommendations : Pureed/extremely thick  (IDDSI Level 4)  Liquid Diet Recommendations: Thin (IDDSI Level 0)  Compensatory Swallowing Strategies: Upright 90 degrees as possible for all oral intake  Medication Administration Recommendations: Crushed, With Pureed  Follow up treatments: Diet tolerance monitoring, Patient/family education        Assessment:  Assessment Results: Pt demonstrated timely swallow onset with thin liquids and puree foods.  Occasional cough occurred which did not appear reactive; unable to determine at bedside if related to PO intake or presence of NG tube.  Pt may resume PO diet that she had been receiving at SNF at this time.  Recommend remove NG tube to complete MBSS and assess safety with solid foods before determining if it should be replaced.  Recommend follow-up skilled dysphagia treatment in this setting as well as at next level of care.    Medical Staff Made Aware: Yes      Plan:  Plan  Inpatient/Swing Bed or Outpatient: Inpatient  Treatment/Interventions: Assess diet tolerance, Bolus trials, Diet recommendations, Complete MBSS  SLP Plan: Skilled SLP  SLP Frequency: 3x per week  SLP Discharge Recommendations: Continue skilled SLP services at the next level of care  Diet Recommendations: Solid, Liquid  Solid Consistency: Pureed/extremely thick (IDDSI Level 4)  Liquid Consistency: Thin (IDDSI Level 0)  Next Treatment Priority: MBSS  Discussed POC: Patient, Caregiver/family, Nursing  Discussed Risks/Benefits: Yes  Patient/Caregiver Agreeable: Yes    Dysphagia Goals:  (Established 5/13/24, projected discontinuation 2 weeks)    Pt will safely swallow recommended diet without s/s aspiration for 90% of observed trials in order to maintain adequate nutrition and hydration.  Pt will safely swallow advanced consistency solids without s/s oral dysphagia or aspiration for 90% of trials.  Pt will utilize safe swallow strategies with 90% acc in order to reduce risk of aspiration and s/s dysphagia.  Pt will complete oral and/or pharyngeal ROM and strengthening exercises in order to improve swallowing skills with least restrictive diet.  Pt to participate in assessment of oropharyngeal swallow function via MBSS for assessment of possible aspiration and to determine least restrictive diet to meet nutritional and hydration needs.       Subjective   Pt pleasant and cooperative.  Seen for assessment in ED with her dtr present.  Pt not able to tolerate upright positioning on ED gurney due to wound on back/bottom, so seen in semi-reclined position.  NG tube present.      General Visit Information:  General Information  Ordering Physician: Dr. Delgado    Reason for Referral:   Pt admitted from SNF with NG for nutrition that is clogged.  Pt had been also consuming PO.  Per pt's daughter, pt does not like pureed food and has been hesitant to consume it.  Trials of soft solids had been initiated recently, but diet remains puree/thin at time of admission.  Swallow evaluation to assess safest route for nutrition/hydration.  Past Medical History Relevant to Rehab: Including but not limited to: type 2 diabetes, COPD, CKD, hypothyroidism, depression, kidney cancer status post resection  BaseLine Diet: NPO at time of assessment  Current Diet : Puree/thin with NG feedings at [facility]    Relevant Imaging:   Radiologist's MBSS report from [facility] 4/18/24.  SLP report not available for review.    FL modified barium swallow study  Order: [id number]  Narrative    EXAMINATION: FL MODIFIED BARIUM  SWALLOW 04/18/2024 02:02 PM  CLINICAL HISTORY: dysphagia  ASSOCIATED DIAGNOSIS:  ORDERING PROVIDER: VIMAL LEWIS  TECHNOLOGISTS NOTE:  COMPARISON: None    FLUOROSCOPIST:  MELANIE HUGHES TIME: 3.3 Minutes    TECHNIQUE:  The examination was performed in conjunction with the Dysphagia/Speech Pathology Team. Various consistencies of contrast (thin barium, nectar thickened barium and pureed consistency of barium) were administered and deglutition was evaluated in the lateral projection utilizing video fluoroscopy.    INTRA-PROCEDURE MEDS:  barium Sulfate 40 % 5 oz Route: Oral  barium Sulfate 40 % 5 oz Route: Oral  Barium Sulfate 60 % 5 g Route: Oral    FINDINGS:  Oral phase: Normal.  Pharyngeal phase: Delayed initiation of the pharyngeal phase of swallowing with the head of the contrast column at the level of the valleculae.  Elevation of the larynx and epiglottic inversion: Demonstrated.  Laryngeal penetration, tracheal aspiration or nasopharyngeal reflux: None.  Significant residual pooling of contrast within the valleculae or pyriform: Trace residuals of contrast within the valleculae and piriform sinuses.  Ancillary findings: Enteric feeding tube. Partially visualized of a right internal jugular central venous catheter.    A complete report will also be performed by the Dysphagia/Speech Pathology Team.    IMPRESSION:  1.  No laryngeal penetration or tracheal aspiration.  2.  Delayed initiation of the pharyngeal phase of swallowing with the head of the contrast column at the level of the valleculae.  3.  Enteric feeding tube.    MACRO: None  Exam End: 04/18/24 14:02    Specimen Collected: 04/18/24 15:11 Last Resulted: 04/18/24 17:36   Received From: [employer]  Result Received: 05/12/24 22:28          Objective   Clinical Swallow Evaluation completed consisting of patient/caregiver interview, oral motor assessment, and analysis of swallow abilities with PO trials (3 ice chips,  3 teaspoons water, 3oz  water via straw,  puree)    ORAL PHASE:   Oral mucosa were pink, moist, and free of obvious lesions.   No oral residue noted post swallow with puree.    PHARYNGEAL PHASE:   Laryngeal elevation was visualized with all trials, however adequacy of hyolaryngeal elevation/excursion cannot be determined at bedside.   Occasional cough occurred after puree consistency which did not appear reactive.  Vocal quality clear post all swallows.         Baseline Assessment:  Baseline Assessment  Respiratory Status: Room air  Behavior/Cognition: Alert, Cooperative, Pleasant mood, Confused  Patient Positioning: Partially/Semi Reclined  Baseline Vocal Quality: Normal      Pain:  Pain Assessment  Pain Assessment: 0-10  Pain Score: 0 - No pain      Oral/Motor Assessment:  Oral/Motor Assessment  Oral Hygiene: WFL  Dentition: Edentulous, Other (Comment) (Pt has full dentures but not present at hospital.)  Labial ROM: Within Functional Limits  Lingual ROM: Within Functional Limits  Vocal Quality: Within Functional Limits      Clinical Observations:  Clinical Observations  Patient Positioning: Partially/Semi Reclined  Delayed Cough: Pureed/Extremely Thick (IDDSI Level 4)      Inpatient:  Education Documentation  SLP completed verbal education with patient and her daughter regarding assessment and plan of care.  Both expressed comprehension of discussion and plan going forward.  SLP requested pt's daughter bring in her dentures, which she agreed to do.       Consultations/Referrals/Coordination of Services:   SLP discussed recommendation to pull NG tube and conduct further testing with MBSS before determining if it needs to be replaced with Dr. Delgado, who was in agreement.

## 2024-05-13 NOTE — ED PROVIDER NOTES
HPI   No chief complaint on file.      73-year-old female with complicated past medical history including dysphagia requiring NG tube feedings, type 2 diabetes, COPD, CKD, hypothyroidism, depression, kidney cancer status post resection and advanced directive of DNR CC is brought to the emergency department from her nursing home at [facility] with a chief complaint of clogged feeding tube.  The patient has a Dobbhoff feeding tube that has not been flushing or returning of fluid for a day.  The patient is confused and does not verbalize if she has any pain or discomfort.      History provided by:  Patient  History limited by:  Mental status change   used: No (I am a native Macedonian speaker and attempted to communicate in both Macedonian and English)                        Ankit Coma Scale Score: 14                     Patient History   Past Medical History:   Diagnosis Date    Abnormal findings on diagnostic imaging of other abdominal regions, including retroperitoneum     Abnormal CT of the abdomen    Abnormal findings on diagnostic imaging of other specified body structures     Abnormal computed tomography angiography (CTA)    Candidiasis of skin and nail 09/03/2019    Candidal intertrigo    Cellulitis of left lower limb 08/16/2021    Cellulitis of left lower leg    Cellulitis of left toe 06/30/2021    Acute paronychia of toe of left foot    Cellulitis of right lower limb 03/08/2021    Cellulitis of right lower extremity    Central retinal vein occlusion, left eye, stable (CMS-HCC) 03/21/2018    Central retinal vein occlusion of left eye    Cramp and spasm 08/16/2021    Cramps, extremity    Encounter for immunization 03/21/2018    Immunization due    Encounter for immunization 03/21/2018    Need for pneumococcal vaccination    Other conditions influencing health status 03/21/2018    Uncontrolled diabetes mellitus    Other injury of unspecified body region, initial encounter 12/15/2017     Bruising    Other injury of unspecified body region, initial encounter 01/10/2019    Surgical wound present    Other visual disturbances 03/21/2018    Blurry vision    Personal history of diseases of the skin and subcutaneous tissue 11/24/2020    History of skin pruritus    Personal history of other diseases of the respiratory system 07/05/2019    History of sore throat    Personal history of other medical treatment     H/O Doppler ultrasound    Personal history of other specified conditions 06/03/2020    History of headache    Personal history of other specified conditions 07/07/2020    History of flank pain    Personal history of other specified conditions 09/06/2019    History of fatigue    Personal history of other specified conditions 07/22/2020    History of wheezing    Unspecified fall, initial encounter 06/04/2018    Fall    Vitamin D deficiency, unspecified 06/04/2018    Mild vitamin D deficiency     Past Surgical History:   Procedure Laterality Date    CT ANGIO NECK  10/24/2023    CT NECK ANGIO W AND WO IV CONTRAST 10/24/2023    OTHER SURGICAL HISTORY  05/10/2022    Lumpectomy    OTHER SURGICAL HISTORY  01/27/2022    Nephrectomy partial     Family History   Problem Relation Name Age of Onset    Diabetes Other multiple Family Members      Social History     Tobacco Use    Smoking status: Former     Types: Cigarettes    Smokeless tobacco: Never   Substance Use Topics    Alcohol use: Never    Drug use: Not Currently     Types: Marijuana       Physical Exam   ED Triage Vitals [05/12/24 2235]   Temperature Heart Rate Respirations BP   36.8 °C (98.3 °F) 87 16 136/60      Pulse Ox Temp Source Heart Rate Source Patient Position   94 % Oral -- --      BP Location FiO2 (%)     -- --       Physical Exam  Vitals and nursing note reviewed.   Constitutional:       General: She is not in acute distress.     Appearance: Normal appearance. She is well-developed. She is not toxic-appearing.   HENT:      Head:  Normocephalic and atraumatic.      Nose:      Comments: Dobbhoff nasogastric tube in place without evidence of epistaxis or other discharge  Eyes:      Extraocular Movements: Extraocular movements intact.      Conjunctiva/sclera: Conjunctivae normal.      Pupils: Pupils are equal, round, and reactive to light.   Cardiovascular:      Rate and Rhythm: Normal rate and regular rhythm.   Pulmonary:      Effort: Pulmonary effort is normal. No respiratory distress.      Breath sounds: Normal breath sounds.   Abdominal:      Palpations: Abdomen is soft.      Tenderness: There is no abdominal tenderness.   Musculoskeletal:         General: No swelling.      Cervical back: Neck supple. No rigidity or tenderness.   Skin:     General: Skin is warm and dry.      Capillary Refill: Capillary refill takes less than 2 seconds.   Neurological:      Mental Status: She is alert. She is disoriented.   Psychiatric:         Mood and Affect: Mood normal.         ED Course & MDM   Diagnoses as of 05/12/24 6156   Obstruction of feeding tube, initial encounter       Medical Decision Making    HPI:  As Above  PMHx/PSHx/Meds/Allergies/SH/FH as per nursing documentation and reviewed.  Review of systems: Total of 10 systems reviewed and otherwise negative except as noted elsewhere    DDX: As described in MDM    If performed, radiology listed above interpreted by me and confirmed by the Radiologist.  Medications administered during this visit (name and route): see MAR  Social determinants of health considered for this visit: lives in a nursing home and has DNR CC  If performed, EKG interpreted by me and detailed above    MDM Summary/considerations:  73-year-old female presenting with clogged nasogastric tube.  Flushing was unsuccessful.  As I am not familiar with the Dobbhoff and the procedure to place or discontinue, I spoke with the patient's primary physician at the nursing home who agrees to admit for observation and replacement of the tube  versus other modality.  Although the patient is altered, this may be her baseline and she is being admitted to her primary care provider.  She is neither febrile, tachycardic or tachypneic and not meeting SIRS criteria.  Physical exam is benign.    Prescriptions provided include: ###    The patient was seen and triaged by our nursing/medic staff, their vitals were taken and the staff notes were reviewed.  If the patient arrived by an EMS squad or an outside agency, we discussed the case with transporting EMS medic, police, or other historians. My initial assessment was attention to their airway, breathing, and circulatory status.  We addressed any immediate or life threatening findings and completed a medical history and a physical exam if the patient or those legally responsible were in agreement with this.     **Disclaimer:  This note was dictated by speech recognition technology.  Minor errors in transcription may be present.  Please contact for clarification or corrections.      Amount and/or Complexity of Data Reviewed  Labs: ordered.        Procedure  Procedures     Yi Wagoner MD  05/12/24 6178

## 2024-05-13 NOTE — ED NOTES
Spoke with Dr. Delgado on the phone, ok to pull NG tube.  10Fr NG pulled at this time, brown substance noted at bottom.      Tuyet Gore RN  05/13/24 4337

## 2024-05-13 NOTE — PROGRESS NOTES
Sara Santiago is a 73 y.o. female on day 0 of admission presenting with Clogged feeding tube.       05/13/24 1237   Discharge Planning   Living Arrangements Other (Comment)   Support Systems Children   Assistance Needed PT/OT   Type of Residence Skilled nursing facility;Inpatient rehab facility   Care Facility Name [facility]   Who is requesting discharge planning? Provider   Home or Post Acute Services Post acute facilities (Rehab/SNF/etc)   Type of Post Acute Facility Services Skilled nursing;Rehab   Patient expects to be discharged to: return to [facility]   Patient Choice   Provider Choice list and CMS website (https://medicare.gov/care-compare#search) for post-acute Quality and Resource Measure Data were provided and reviewed with: Other (Comment)  (return to sending facility)   Patient / Family choosing to utilize agency / facility established prior to hospitalization Yes         Krystle Hernandez RN

## 2024-05-13 NOTE — PROGRESS NOTES
Sara Santiago is a 73 y.o. female on day 0 of admission presenting with Clogged feeding tube.       05/13/24 1243   Current Planned Discharge Disposition   Current Planned Discharge Disposition Inter     Patient is a bed hold from [facility]. Patient altered but interactive, her daughter Lisa provided information.   Patient lived at home with her daughter Lisa until a fall in March followed by a spinal fusion. Spent most of April in hospital. She was discharged to SNF at [facility], transferred to [facility].   She uses a walker, NG tube feedings.   Daughter stresses that patient is not to receive Sertraline or trazodone routinely, PRN only as they sedate her.  ADOD 05/14/2024  Patient will return to [facility], she is a bed hold, no auth needed. They would need any therapy notes if being seen by therapy, RNCC advised no therapy is ordered.    Krystle Hernandez RN

## 2024-05-13 NOTE — H&P
HISTORY AND PHYSICAL EXAMINATION    PATIENT NAME: Sara Santiago    MRN: [id number]  SERVICE DATE: 5/13/2024       PRIMARY CARE PHYSICIAN: Simon Delgado MD          ASSESSMENT AND PLAN     # Dysphagia  -MBS tomorrow  -May need NG to be placed    # 1. S/p enterobacter surgical site infection 4/12/2024  _L2 pedicle fracture, s/p T11-L4 fusion  _ Lumbar osteomyelitis   -On IV antibiotics will continue the same    #DM2 / neuropathy  -Insulin sliding scale  -Patient is n.p.o. and hence cannot get the gabapentin    # Opoid use disorder on methadone   -Patient has chronic pain also  -Has been on methadone for several years    # Hypertension  -IV hydralazine as needed    # CKD/THAD  -IV fluids            Discussed with nurses/case management team and the specialists involved in this patient's care. Reviewed the EMR and documentation from other care-givers.    SUBJECTIVE  CHIEF COMPLAINT:      HPI: This is a 73 y.o. female who was sent to the ER for a clogged NG tube.      72 y/o woman pmhx sig for T2DM last A1c 6.7, COPD, hx renal Ca s/p partial nephrectomy, hx breast Ca s/p b/l implants, recent admission 3/14-4/1/24 to [facility] after fall from chair w/ L2 b/l pedicle fracture and underwent T11-L4 reduction and fusion 3/22. Discharged to SNF 4/1,     Patient has had the NG tube the last few weeks as her swallowing was very poor following the surgery to her back.  In the last week patient has been eating better per daughter who was at bedside.  Speech therapy had seen the patient discussed with the speech therapist will discontinue the NG tube and wait for a MBS to be done tomorrow and determine the need for the NGT after that.  Patient is a very poor historian unable to offer any history    ED HPI : 73-year-old female with complicated past medical history including dysphagia requiring NG tube feedings, type 2 diabetes, COPD, CKD, hypothyroidism, depression, kidney cancer status post resection and advanced directive of DNR CC  is brought to the emergency department from her nursing home at [facility] with a chief complaint of clogged feeding tube. The patient has a Dobbhoff feeding tube that has not been flushing or returning of fluid for a day. The patient is confused and does not verbalize if she has any pain or discomfort.     PAST MEDICAL HISTORY:   Past Medical History:   Diagnosis Date    Abnormal findings on diagnostic imaging of other abdominal regions, including retroperitoneum     Abnormal CT of the abdomen    Abnormal findings on diagnostic imaging of other specified body structures     Abnormal computed tomography angiography (CTA)    Candidiasis of skin and nail 09/03/2019    Candidal intertrigo    Cellulitis of left lower limb 08/16/2021    Cellulitis of left lower leg    Cellulitis of left toe 06/30/2021    Acute paronychia of toe of left foot    Cellulitis of right lower limb 03/08/2021    Cellulitis of right lower extremity    Central retinal vein occlusion, left eye, stable (CMS-[facility]) 03/21/2018    Central retinal vein occlusion of left eye    Cramp and spasm 08/16/2021    Cramps, extremity    Encounter for immunization 03/21/2018    Immunization due    Encounter for immunization 03/21/2018    Need for pneumococcal vaccination    Other conditions influencing health status 03/21/2018    Uncontrolled diabetes mellitus    Other injury of unspecified body region, initial encounter 12/15/2017    Bruising    Other injury of unspecified body region, initial encounter 01/10/2019    Surgical wound present    Other visual disturbances 03/21/2018    Blurry vision    Personal history of diseases of the skin and subcutaneous tissue 11/24/2020    History of skin pruritus    Personal history of other diseases of the respiratory system 07/05/2019    History of sore throat    Personal history of other medical treatment     H/O Doppler ultrasound    Personal history of other specified conditions 06/03/2020    History of  headache    Personal history of other specified conditions 07/07/2020    History of flank pain    Personal history of other specified conditions 09/06/2019    History of fatigue    Personal history of other specified conditions 07/22/2020    History of wheezing    Unspecified fall, initial encounter 06/04/2018    Fall    Vitamin D deficiency, unspecified 06/04/2018    Mild vitamin D deficiency     PAST SURGICAL HISTORY:   Past Surgical History:   Procedure Laterality Date    CT ANGIO NECK  10/24/2023    CT NECK ANGIO W AND WO IV CONTRAST 10/24/2023    OTHER SURGICAL HISTORY  05/10/2022    Lumpectomy    OTHER SURGICAL HISTORY  01/27/2022    Nephrectomy partial     FAMILY HISTORY:   Family History   Problem Relation Name Age of Onset    Diabetes Other multiple Family Members      SOCIAL HISTORY:   Social History     Tobacco Use    Smoking status: Former     Types: Cigarettes    Smokeless tobacco: Never   Substance Use Topics    Alcohol use: Never    Drug use: Not Currently     Types: Marijuana       MEDICATIONS: Prior to Admission Medications  (Not in a hospital admission)     CURRENT ALLERGIES:   Allergies   Allergen Reactions    Other Other and Unknown     Sulfa containing compounds    Sulfa (Sulfonamide Antibiotics) Unknown       COMPLETE REVIEW OF SYSTEMS:      GENERAL: No fever, appetite stable.  HEENT: No epistaxis, no mouth ulcers  NECK: no neck pain  RESPIRATORY: No new resp symptoms.  CARDIOVASCULAR: No cp, no leg edema, No orthopnea  GI: No NVD, no GI Bleed  : No hematuria, no dysuria  MUSCULOSKELETAL: No new jt pains or swelling  SKIN: No rashes, no ulcers  PSYCH: Denies feeling anxious or depressed.   HEMATOLOGY/LYMPHOLOGY: No bruising, no hx of VTE  ENDOCRINE: No hx of DM  NEURO: No hx of seizures or syncope, No hx of CVA      OBJECTIVE  PHYSICAL EXAM:   Heart Rate:  [84-87]   Temperature:  [36.5 °C (97.7 °F)-36.8 °C (98.3 °F)]   Respirations:  [16-17]   BP: (108-136)/(60-63)   Height:  [157.5 cm (5'  "2\")]   Weight:  [83.5 kg (184 lb)]   Pulse Ox:  [94 %-97 %]     Body mass index is 33.65 kg/m².    GENERAL: awake, alert, Ox3, cooperative resting comfortably  SKIN: Skin turgor normal. No rashes  HEENT: no epistaxis, Moist mucosa.  NECK: supple  BACK: spine nontender to palpation, No CVAT.  LUNGS: Vesicular breath sounds, with no wheeze, no crepitations.   CARDIAC: REGULAR. S1 and S2; no rubs, no murmur  ABDOMEN: Abdomen soft, non-tender. BS+  EXTREMITIES: No edema, Good capillary refill.   NEURO: Insight GOOD. Motor and sensory exam wnl. No invol movements. No ataxia.  WOUND:   MUSCULOSKELETAL: No acute inflammation       .  Current Facility-Administered Medications:     cefepime (Maxipime) IV 2 g, 2 g, intravenous, q8h, Simon Delgado MD, Stopped at 05/13/24 0857    dextrose 50 % injection 12.5 g, 12.5 g, intravenous, q15 min PRN, Simon Delgado MD    dextrose 50 % injection 25 g, 25 g, intravenous, q15 min PRN, Simon Delgado MD    glucagon (Glucagen) injection 1 mg, 1 mg, intramuscular, q15 min PRN, Simon Delgado MD    glucagon (Glucagen) injection 1 mg, 1 mg, intramuscular, q15 min PRN, Simon Delgado MD    heparin (porcine) injection 5,000 Units, 5,000 Units, subcutaneous, q8h, Simon Delgado MD, 5,000 Units at 05/13/24 0652    hydrALAZINE (Apresoline) injection 10 mg, 10 mg, intravenous, q4h PRN, Simon Delgado MD    HYDROmorphone (Dilaudid) injection 1 mg, 1 mg, intravenous, q6h, Simon Delgado MD, 1 mg at 05/13/24 0653    insulin lispro (HumaLOG) injection 0-10 Units, 0-10 Units, subcutaneous, TID with meals, Simon Delgado MD    ipratropium-albuteroL (Duo-Neb) 0.5-2.5 mg/3 mL nebulizer solution 3 mL, 3 mL, nebulization, q6h PRN, Simon Delgado MD    morphine injection 4 mg, 4 mg, intravenous, q3h PRN, Simon Delgado MD    ondansetron (Zofran) injection 4 mg, 4 mg, intravenous, q4h PRN, Simon Delgado MD    sodium chloride 0.9% infusion, 125 " mL/hr, intravenous, Continuous, Yi Wagoner MD, Last Rate: 125 mL/hr at 05/13/24 0830, 125 mL/hr at 05/13/24 0830    Current Outpatient Medications:     acetaminophen (Tylenol) 500 mg tablet, Take 2 tablets (1,000 mg) by mouth every 6 hours., Disp: 180 tablet, Rfl: 3    albuterol 0.63 mg/3 mL nebulizer solution, USE 1 UNIT DOSE IN NEBULIZER EVERY 4 TO 6 HOURS AS NEEDED., Disp: , Rfl:     aspirin 81 mg EC tablet, Take 1 tablet (81 mg) by mouth once daily., Disp: 90 tablet, Rfl: 3    atorvastatin (Lipitor) 40 mg tablet, Take by mouth once daily., Disp: , Rfl:     baclofen (Lioresal) 100 % powder powder, , Disp: , Rfl:     blood sugar diagnostic (True Metrix Glucose Test Strip) strip, in the morning, at noon, and at bedtime., Disp: , Rfl:     calamine-zinc oxide 8-8 % lotion, APPLY 2-3 TIMES DAILY TO AFFECTED AREA(S)., Disp: , Rfl:     calcitriol (Rocaltrol) 0.25 mcg capsule, TAKE 1 CAPSULE BY MOUTH EVERY DAY, Disp: 30 capsule, Rfl: 11    cholecalciferol (Vitamin D-3) 25 MCG (1000 UT) tablet, Take 1 tablet (1,000 Units) by mouth once daily., Disp: 90 tablet, Rfl: 3    diaper,brief,adult,disposable misc, USE AS NEEDED, Disp: 96 each, Rfl: 11    diclofenac sodium 1 % kit, apply to affected areas 2-3 times daily, Disp: 1 each, Rfl: 11    emollient (Biafine) cream, Emollient Base External Cream APPLY TO BOTH LEGS TWICE A DAY Quantity: 20 Refills: 3 Ordered: 27-Apr-2021 Kika Ohara MD Start : 27-Apr-2021 Active, Disp: , Rfl:     eucerin (Minerin Creme) cream, APPLY TO BOTH LEGS TWICE A DAY, Disp: 113 g, Rfl: 0    fluticasone (Flovent) 110 mcg/actuation inhaler, Inhale 2 puffs 2 times a day. RINSE MOUTH AFTER USE., Disp: 12 g, Rfl: 11    gabapentin (Neurontin) 800 mg tablet, Take 1 tablet (800 mg) by mouth 2 times a day., Disp: 90 capsule, Rfl: 3    guaiFENesin (Robitussin) 100 mg/5 mL syrup, Take by mouth every 4 hours., Disp: , Rfl:     Heartburn Relief, famotidine, 10 mg tablet, TAKE 1 TABLET BY  "MOUTH EVERYDAY AT BEDTIME, Disp: 30 tablet, Rfl: 11    insulin NPH and regular human (HumuLIN 70/30 U-100 KwikPen) 100 unit/mL (70-30) injection, Inject 25 Units under the skin 2 times a day with meals., Disp: , Rfl:     ketoconazole (NIZOral) 2 % cream, 150 Applications every 12 hours., Disp: , Rfl:     lancets 30 gauge misc, USE AS DIRECTED., Disp: , Rfl:     latanoprost, PF, 0.005 % drops, Administer into affected eye(s)., Disp: , Rfl:     levothyroxine (Synthroid, Levoxyl) 25 mcg tablet, Take 1 tablet (25 mcg) by mouth once every day., Disp: 90 tablet, Rfl: 3    lidocaine (Lidoderm) 5 % patch, Place 1 patch on the skin once every day., Disp: , Rfl:     lidocaine 4 % cream, Apply topically 2 times a day as needed for mild pain (1 - 3)., Disp: 28 g, Rfl: 3    loratadine (Claritin) 10 mg tablet, TAKE 1 TABLET BY MOUTH EVERY DAY AS NEEDED, Disp: 30 tablet, Rfl: 6    methadone (Methadose) 40 mg dispersible tablet, Take 2 tablets (80 mg) by mouth once daily., Disp: , Rfl:     mometasone-formoterol (Dulera 200) 200-5 mcg/actuation inhaler, Inhale every 12 hours., Disp: , Rfl:     multivitamin tablet, Take 1 tablet by mouth once daily., Disp: 90 tablet, Rfl: 3    nystatin (Mycostatin) 100,000 unit/gram powder, 30 Applications every 12 hours., Disp: , Rfl:     pen needle 1/2\" 29G X 12mm needle, USE AS DIRECTED., Disp: 100 each, Rfl: 11    polyethylene glycol (Miralax) 17 gram/dose powder, Take 17 g by mouth once daily as needed (constipation)., Disp: 510 g, Rfl: 11    sertraline (Zoloft) 100 mg tablet, Take 2 tablets (200 mg) by mouth once daily., Disp: 90 tablet, Rfl: 3    traZODone (Desyrel) 50 mg tablet, Take by mouth., Disp: , Rfl:     white petrolatum 41 % ointment ointment, Apply 1 Application topically 3 times a day., Disp: 85 g, Rfl: 1    DATA:   Diagnostic tests reviewed for today's visit:    Most recent labs  Admission on 05/12/2024   Component Date Value Ref Range Status    WBC 05/13/2024 7.2  4.4 - 11.3 " x10*3/uL Final    nRBC 05/13/2024 0.0  0.0 - 0.0 /100 WBCs Final    RBC 05/13/2024 3.98 (L)  4.00 - 5.20 x10*6/uL Final    Hemoglobin 05/13/2024 12.0  12.0 - 16.0 g/dL Final    Hematocrit 05/13/2024 38.9  36.0 - 46.0 % Final    MCV 05/13/2024 98  80 - 100 fL Final    MCH 05/13/2024 30.2  26.0 - 34.0 pg Final    MCHC 05/13/2024 30.8 (L)  32.0 - 36.0 g/dL Final    RDW 05/13/2024 16.9 (H)  11.5 - 14.5 % Final    Platelets 05/13/2024 102 (L)  150 - 450 x10*3/uL Final    Platelet count verified by smear review.    Neutrophils % 05/13/2024 70.5  40.0 - 80.0 % Final    Immature Granulocytes %, Automated 05/13/2024 0.3  0.0 - 0.9 % Final    Immature Granulocyte Count (IG) includes promyelocytes, myelocytes and metamyelocytes but does not include bands. Percent differential counts (%) should be interpreted in the context of the absolute cell counts (cells/UL).    Lymphocytes % 05/13/2024 19.2  13.0 - 44.0 % Final    Monocytes % 05/13/2024 7.2  2.0 - 10.0 % Final    Eosinophils % 05/13/2024 2.2  0.0 - 6.0 % Final    Basophils % 05/13/2024 0.6  0.0 - 2.0 % Final    Neutrophils Absolute 05/13/2024 5.11  1.60 - 5.50 x10*3/uL Final    Percent differential counts (%) should be interpreted in the context of the absolute cell counts (cells/uL).    Immature Granulocytes Absolute, Au* 05/13/2024 0.02  0.00 - 0.50 x10*3/uL Final    Lymphocytes Absolute 05/13/2024 1.39  0.80 - 3.00 x10*3/uL Final    Monocytes Absolute 05/13/2024 0.52  0.05 - 0.80 x10*3/uL Final    Eosinophils Absolute 05/13/2024 0.16  0.00 - 0.40 x10*3/uL Final    Basophils Absolute 05/13/2024 0.04  0.00 - 0.10 x10*3/uL Final    Glucose 05/13/2024 162 (H)  65 - 99 mg/dL Final    Sodium 05/13/2024 136  133 - 145 mmol/L Final    Potassium 05/13/2024 5.0  3.4 - 5.1 mmol/L Final    Sample slightly hemolyzed results may be affected    Chloride 05/13/2024 105  97 - 107 mmol/L Final    Bicarbonate 05/13/2024 17 (L)  24 - 31 mmol/L Final    Urea Nitrogen 05/13/2024 40 (H)  8 -  25 mg/dL Final    Creatinine 05/13/2024 1.70 (H)  0.40 - 1.60 mg/dL Final    eGFR 05/13/2024 32 (L)  >60 mL/min/1.73m*2 Final    Calculations of estimated GFR are performed using the 2021 CKD-EPI Study Refit equation without the race variable for the IDMS-Traceable creatinine methods.  https://jasn.asnjournals.org/content/early/2021/09/22/ASN.4845471338    Calcium 05/13/2024 9.6  8.5 - 10.4 mg/dL Final    Albumin 05/13/2024 2.6 (L)  3.5 - 5.0 g/dL Final    Alkaline Phosphatase 05/13/2024 178 (H)  35 - 125 U/L Final    Total Protein 05/13/2024 8.8 (H)  5.9 - 7.9 g/dL Final    AST 05/13/2024 30  5 - 40 U/L Final    Sample slightly hemolyzed results may be affected    Bilirubin, Total 05/13/2024 0.4  0.1 - 1.2 mg/dL Final    ALT 05/13/2024 13  5 - 40 U/L Final    Sample slightly hemolyzed results may be affected    Anion Gap 05/13/2024 14  <=19 mmol/L Final    Color, Urine 05/13/2024 Light-Yellow  Light-Yellow, Yellow, Dark-Yellow Final    Appearance, Urine 05/13/2024 Turbid (N)  Clear Final    Specific Gravity, Urine 05/13/2024 1.018  1.005 - 1.035 Final    pH, Urine 05/13/2024 6.0  5.0, 5.5, 6.0, 6.5, 7.0, 7.5, 8.0 Final    Protein, Urine 05/13/2024 100 (2+) (A)  NEGATIVE, 10 (TRACE), 20 (TRACE) mg/dL Final    Glucose, Urine 05/13/2024 Normal  Normal mg/dL Final    Blood, Urine 05/13/2024 0.1 (1+) (A)  NEGATIVE Final    Ketones, Urine 05/13/2024 TRACE (A)  NEGATIVE mg/dL Final    Bilirubin, Urine 05/13/2024 NEGATIVE  NEGATIVE Final    Urobilinogen, Urine 05/13/2024 Normal  Normal mg/dL Final    Nitrite, Urine 05/13/2024 NEGATIVE  NEGATIVE Final    Leukocyte Esterase, Urine 05/13/2024 250 Pravin/µL (A)  NEGATIVE Final    WBC 05/13/2024 5.4  4.4 - 11.3 x10*3/uL Final    nRBC 05/13/2024 0.0  0.0 - 0.0 /100 WBCs Final    RBC 05/13/2024 3.55 (L)  4.00 - 5.20 x10*6/uL Final    Hemoglobin 05/13/2024 10.4 (L)  12.0 - 16.0 g/dL Final    Hematocrit 05/13/2024 34.4 (L)  36.0 - 46.0 % Final    MCV 05/13/2024 97  80 - 100 fL Final  "   MCH 05/13/2024 29.3  26.0 - 34.0 pg Final    MCHC 05/13/2024 30.2 (L)  32.0 - 36.0 g/dL Final    RDW 05/13/2024 16.6 (H)  11.5 - 14.5 % Final    Platelets 05/13/2024 82 (L)  150 - 450 x10*3/uL Final    Glucose 05/13/2024 186 (H)  65 - 99 mg/dL Final    Sodium 05/13/2024 136  133 - 145 mmol/L Final    Potassium 05/13/2024 4.0  3.4 - 5.1 mmol/L Final    Chloride 05/13/2024 106  97 - 107 mmol/L Final    Bicarbonate 05/13/2024 16 (L)  24 - 31 mmol/L Final    Urea Nitrogen 05/13/2024 42 (H)  8 - 25 mg/dL Final    Creatinine 05/13/2024 1.80 (H)  0.40 - 1.60 mg/dL Final    eGFR 05/13/2024 29 (L)  >60 mL/min/1.73m*2 Final    Calculations of estimated GFR are performed using the 2021 CKD-EPI Study Refit equation without the race variable for the IDMS-Traceable creatinine methods.  https://jasn.asnjournals.org/content/early/2021/09/22/ASN.5152017529    Calcium 05/13/2024 9.2  8.5 - 10.4 mg/dL Final    Albumin 05/13/2024 2.4 (L)  3.5 - 5.0 g/dL Final    Alkaline Phosphatase 05/13/2024 158 (H)  35 - 125 U/L Final    Total Protein 05/13/2024 8.1 (H)  5.9 - 7.9 g/dL Final    AST 05/13/2024 21  5 - 40 U/L Final    Bilirubin, Total 05/13/2024 0.3  0.1 - 1.2 mg/dL Final    ALT 05/13/2024 11  5 - 40 U/L Final    Anion Gap 05/13/2024 14  <=19 mmol/L Final    WBC, Urine 05/13/2024 >50 (A)  1-5, NONE /HPF Final    WBC Clumps, Urine 05/13/2024 OCCASIONAL  Reference range not established. /HPF Final    RBC, Urine 05/13/2024 6-10 (A)  NONE, 1-2, 3-5 /HPF Final    Squamous Epithelial Cells, Urine 05/13/2024 1-9 (SPARSE)  Reference range not established. /HPF Final    Mucus, Urine 05/13/2024 FEW  Reference range not established. /LPF Final    Amorphous Crystals, Urine 05/13/2024 1+  NONE, 1+, 2+ /HPF Final    RBC Morphology 05/13/2024 No significant RBC morphology present   Final    POCT Glucose 05/13/2024 169 (H)  74 - 99 mg/dL Final    POCT Glucose 05/13/2024 166 (H)  74 - 99 mg/dL Final       No results found for: \"GLU\"     ECG 12 lead " (Ancillary Performed)  Normal sinus rhythm  Normal ECG  When compared with ECG of 03-NOV-2022 17:08,  No significant change was found  See ED provider note for full interpretation and clinical correlation  Confirmed by Ashley Lacy ([id number]) on 3/3/2024 3:02:12 PM        EKG:   Tele:     SIGNATURE: Simon Delgado MD  DATE: May 13, 2024  TIME: 10:25 AM

## 2024-05-13 NOTE — PROGRESS NOTES
Sara Santiago is a 73 y.o. female on day 0 of admission presenting with Clogged feeding tube.       05/13/24 1233   Physical Activity   On average, how many days per week do you engage in moderate to strenuous exercise (like a brisk walk)? 0 days   On average, how many minutes do you engage in exercise at this level? 0 min   Financial Resource Strain   How hard is it for you to pay for the very basics like food, housing, medical care, and heating? Not hard   Housing Stability   In the last 12 months, was there a time when you were not able to pay the mortgage or rent on time? N   In the last 12 months, how many places have you lived? 1   In the last 12 months, was there a time when you did not have a steady place to sleep or slept in a shelter (including now)? N   Transportation Needs   In the past 12 months, has lack of transportation kept you from medical appointments or from getting medications? no   In the past 12 months, has lack of transportation kept you from meetings, work, or from getting things needed for daily living? No   Food Insecurity   Within the past 12 months, you worried that your food would run out before you got the money to buy more. Never true   Within the past 12 months, the food you bought just didn't last and you didn't have money to get more. Never true   Stress   Do you feel stress - tense, restless, nervous, or anxious, or unable to sleep at night because your mind is troubled all the time - these days? Not at all   Social Connections   In a typical week, how many times do you talk on the phone with family, friends, or neighbors? More than 3   How often do you get together with friends or relatives? More than 3   How often do you attend Confucianism or Muslim services? Never   Do you belong to any clubs or organizations such as Confucianism groups, unions, fraternal or athletic groups, or school groups? No   How often do you attend meetings of the clubs or organizations you belong to? Never   Are  you , , , , never , or living with a partner?    Intimate Partner Violence   Within the last year, have you been afraid of your partner or ex-partner? No   Within the last year, have you been humiliated or emotionally abused in other ways by your partner or ex-partner? No   Within the last year, have you been kicked, hit, slapped, or otherwise physically hurt by your partner or ex-partner? No   Within the last year, have you been raped or forced to have any kind of sexual activity by your partner or ex-partner? No   Alcohol Use   Q1: How often do you have a drink containing alcohol? Never   Q2: How many drinks containing alcohol do you have on a typical day when you are drinking? None   Q3: How often do you have six or more drinks on one occasion? Never   Utilities   In the past 12 months has the electric, gas, oil, or water company threatened to shut off services in your home? No   Health Literacy   How often do you need to have someone help you when you read instructions, pamphlets, or other written material from your doctor or pharmacy? Often         Krystle Hernandez RN

## 2024-05-13 NOTE — PROGRESS NOTES
Sara Santiago is a 73 y.o. female on day 0 of admission presenting with Clogged feeding tube.       05/13/24 1242   ACS Disability Status   Are you deaf or do you have serious difficulty hearing? N   Are you blind or do you have serious difficulty seeing, even when wearing glasses? N   Because of a physical, mental, or emotional condition, do you have serious difficulty concentrating, remembering, or making decisions? (5 years old or older) Y   Do you have serious difficulty walking or climbing stairs? Y   Do you have serious difficulty dressing or bathing? Y   Because of a physical, mental, or emotional condition, do you have serious difficulty doing errands alone such as visiting the doctor? Y         Krystle Hernandez RN

## 2024-05-13 NOTE — ED NOTES
Spoke with IR that patient will need IR to do the dobhoff tube, she will speak with the Dr. There.     Tuyet Gore, MIKE  05/13/24 0102

## 2024-05-14 ENCOUNTER — DOCUMENTATION (OUTPATIENT)
Dept: RESEARCH | Age: 74
End: 2024-05-14
Payer: COMMERCIAL

## 2024-05-14 PROBLEM — T85.598A: Status: ACTIVE | Noted: 2024-05-14

## 2024-05-14 LAB
ALBUMIN SERPL-MCNC: 2.2 G/DL (ref 3.5–5)
ALP BLD-CCNC: 136 U/L (ref 35–125)
ALT SERPL-CCNC: 10 U/L (ref 5–40)
ANION GAP BLDA CALCULATED.4IONS-SCNC: 11 MMO/L (ref 10–25)
ANION GAP SERPL CALC-SCNC: 13 MMOL/L
ARTERIAL PATENCY WRIST A: POSITIVE
AST SERPL-CCNC: 20 U/L (ref 5–40)
BASE EXCESS BLDA CALC-SCNC: -9.5 MMOL/L (ref -2–3)
BILIRUB SERPL-MCNC: 0.3 MG/DL (ref 0.1–1.2)
BODY TEMPERATURE: 37 DEGREES CELSIUS
BUN SERPL-MCNC: 40 MG/DL (ref 8–25)
CA-I BLDA-SCNC: 1.31 MMOL/L (ref 1.1–1.33)
CALCIUM SERPL-MCNC: 8.7 MG/DL (ref 8.5–10.4)
CHLORIDE BLDA-SCNC: 111 MMOL/L (ref 98–107)
CHLORIDE SERPL-SCNC: 110 MMOL/L (ref 97–107)
CK SERPL-CCNC: <10 U/L (ref 24–195)
CO2 SERPL-SCNC: 16 MMOL/L (ref 24–31)
CREAT SERPL-MCNC: 2 MG/DL (ref 0.4–1.6)
EGFRCR SERPLBLD CKD-EPI 2021: 26 ML/MIN/1.73M*2
ERYTHROCYTE [DISTWIDTH] IN BLOOD BY AUTOMATED COUNT: 16.3 % (ref 11.5–14.5)
GLUCOSE BLD MANUAL STRIP-MCNC: 143 MG/DL (ref 74–99)
GLUCOSE BLD MANUAL STRIP-MCNC: 181 MG/DL (ref 74–99)
GLUCOSE BLD MANUAL STRIP-MCNC: 191 MG/DL (ref 74–99)
GLUCOSE BLD MANUAL STRIP-MCNC: 79 MG/DL (ref 74–99)
GLUCOSE BLDA-MCNC: 322 MG/DL (ref 74–99)
GLUCOSE SERPL-MCNC: 175 MG/DL (ref 65–99)
HCO3 BLDA-SCNC: 15.5 MMOL/L (ref 22–26)
HCT VFR BLD AUTO: 31.2 % (ref 36–46)
HCT VFR BLD EST: 31 % (ref 36–46)
HGB BLD-MCNC: 9.8 G/DL (ref 12–16)
HGB BLDA-MCNC: 10.2 G/DL (ref 12–16)
INHALED O2 CONCENTRATION: 21 %
LACTATE BLDA-SCNC: 1.1 MMOL/L (ref 0.4–2)
MCH RBC QN AUTO: 29.8 PG (ref 26–34)
MCHC RBC AUTO-ENTMCNC: 31.4 G/DL (ref 32–36)
MCV RBC AUTO: 95 FL (ref 80–100)
NRBC BLD-RTO: 0 /100 WBCS (ref 0–0)
OXYHGB MFR BLDA: 93.9 % (ref 94–98)
PCO2 BLDA: 30 MM HG (ref 38–42)
PH BLDA: 7.32 PH (ref 7.38–7.42)
PLATELET # BLD AUTO: 67 X10*3/UL (ref 150–450)
PO2 BLDA: 91 MM HG (ref 85–95)
POTASSIUM BLDA-SCNC: 3.4 MMOL/L (ref 3.5–5.3)
POTASSIUM SERPL-SCNC: 3.4 MMOL/L (ref 3.4–5.1)
PROT SERPL-MCNC: 7.3 G/DL (ref 5.9–7.9)
RBC # BLD AUTO: 3.29 X10*6/UL (ref 4–5.2)
SAO2 % BLDA: 100 % (ref 94–100)
SODIUM BLDA-SCNC: 134 MMOL/L (ref 136–145)
SODIUM SERPL-SCNC: 139 MMOL/L (ref 133–145)
SPECIMEN DRAWN FROM PATIENT: ABNORMAL
WBC # BLD AUTO: 3.6 X10*3/UL (ref 4.4–11.3)

## 2024-05-14 PROCEDURE — 2500000004 HC RX 250 GENERAL PHARMACY W/ HCPCS (ALT 636 FOR OP/ED): Performed by: INTERNAL MEDICINE

## 2024-05-14 PROCEDURE — 87040 BLOOD CULTURE FOR BACTERIA: CPT | Mod: WESLAB | Performed by: NURSE PRACTITIONER

## 2024-05-14 PROCEDURE — 99222 1ST HOSP IP/OBS MODERATE 55: CPT | Performed by: STUDENT IN AN ORGANIZED HEALTH CARE EDUCATION/TRAINING PROGRAM

## 2024-05-14 PROCEDURE — 82947 ASSAY GLUCOSE BLOOD QUANT: CPT

## 2024-05-14 PROCEDURE — 1210000001 HC SEMI-PRIVATE ROOM DAILY

## 2024-05-14 PROCEDURE — 36600 WITHDRAWAL OF ARTERIAL BLOOD: CPT

## 2024-05-14 PROCEDURE — 9420000001 HC RT PATIENT EDUCATION 5 MIN

## 2024-05-14 PROCEDURE — 2500000001 HC RX 250 WO HCPCS SELF ADMINISTERED DRUGS (ALT 637 FOR MEDICARE OP): Performed by: INTERNAL MEDICINE

## 2024-05-14 PROCEDURE — 2500000004 HC RX 250 GENERAL PHARMACY W/ HCPCS (ALT 636 FOR OP/ED): Performed by: NURSE PRACTITIONER

## 2024-05-14 PROCEDURE — 82550 ASSAY OF CK (CPK): CPT | Performed by: NURSE PRACTITIONER

## 2024-05-14 PROCEDURE — 2500000002 HC RX 250 W HCPCS SELF ADMINISTERED DRUGS (ALT 637 FOR MEDICARE OP, ALT 636 FOR OP/ED): Performed by: INTERNAL MEDICINE

## 2024-05-14 PROCEDURE — 96372 THER/PROPH/DIAG INJ SC/IM: CPT | Performed by: INTERNAL MEDICINE

## 2024-05-14 PROCEDURE — 84132 ASSAY OF SERUM POTASSIUM: CPT | Performed by: INTERNAL MEDICINE

## 2024-05-14 PROCEDURE — S0109 METHADONE ORAL 5MG: HCPCS | Performed by: INTERNAL MEDICINE

## 2024-05-14 PROCEDURE — 85027 COMPLETE CBC AUTOMATED: CPT | Performed by: INTERNAL MEDICINE

## 2024-05-14 PROCEDURE — 80053 COMPREHEN METABOLIC PANEL: CPT | Performed by: INTERNAL MEDICINE

## 2024-05-14 PROCEDURE — 36415 COLL VENOUS BLD VENIPUNCTURE: CPT | Performed by: NURSE PRACTITIONER

## 2024-05-14 RX ORDER — SIMETHICONE 80 MG
80 TABLET,CHEWABLE ORAL EVERY 6 HOURS PRN
Status: DISCONTINUED | OUTPATIENT
Start: 2024-05-14 | End: 2024-05-16 | Stop reason: HOSPADM

## 2024-05-14 RX ORDER — DAPTOMYCIN IN SODIUM CHLORIDE 500 MG/50ML
8 INJECTION, SOLUTION INTRAVENOUS
Status: COMPLETED | OUTPATIENT
Start: 2024-05-14 | End: 2024-05-14

## 2024-05-14 RX ORDER — METHOCARBAMOL 750 MG/1
750 TABLET, FILM COATED ORAL
Status: DISCONTINUED | OUTPATIENT
Start: 2024-05-14 | End: 2024-05-16 | Stop reason: HOSPADM

## 2024-05-14 RX ORDER — SENNOSIDES 8.6 MG/1
1 TABLET ORAL DAILY
Status: DISCONTINUED | OUTPATIENT
Start: 2024-05-14 | End: 2024-05-16 | Stop reason: HOSPADM

## 2024-05-14 RX ORDER — GABAPENTIN 400 MG/1
400 CAPSULE ORAL 2 TIMES DAILY
Status: DISCONTINUED | OUTPATIENT
Start: 2024-05-14 | End: 2024-05-15

## 2024-05-14 RX ORDER — METHADONE HYDROCHLORIDE 10 MG/1
40 TABLET ORAL EVERY 12 HOURS
Status: DISCONTINUED | OUTPATIENT
Start: 2024-05-14 | End: 2024-05-16 | Stop reason: HOSPADM

## 2024-05-14 RX ORDER — LORATADINE 10 MG/1
10 TABLET ORAL DAILY
Status: DISCONTINUED | OUTPATIENT
Start: 2024-05-14 | End: 2024-05-16 | Stop reason: HOSPADM

## 2024-05-14 RX ORDER — MIRTAZAPINE 15 MG/1
7.5 TABLET, FILM COATED ORAL NIGHTLY
Status: DISCONTINUED | OUTPATIENT
Start: 2024-05-14 | End: 2024-05-16 | Stop reason: HOSPADM

## 2024-05-14 RX ORDER — CEFEPIME 1 G/50ML
2 INJECTION, SOLUTION INTRAVENOUS EVERY 24 HOURS
Status: DISCONTINUED | OUTPATIENT
Start: 2024-05-15 | End: 2024-05-16 | Stop reason: HOSPADM

## 2024-05-14 RX ORDER — LATANOPROST 50 UG/ML
1 SOLUTION/ DROPS OPHTHALMIC NIGHTLY
Status: DISCONTINUED | OUTPATIENT
Start: 2024-05-14 | End: 2024-05-16 | Stop reason: HOSPADM

## 2024-05-14 RX ORDER — FLUTICASONE FUROATE AND VILANTEROL 100; 25 UG/1; UG/1
1 POWDER RESPIRATORY (INHALATION)
Status: DISCONTINUED | OUTPATIENT
Start: 2024-05-15 | End: 2024-05-16 | Stop reason: HOSPADM

## 2024-05-14 RX ORDER — DEXTROSE MONOHYDRATE, SODIUM CHLORIDE, AND POTASSIUM CHLORIDE 50; 1.49; 4.5 G/1000ML; G/1000ML; G/1000ML
80 INJECTION, SOLUTION INTRAVENOUS CONTINUOUS
Status: DISCONTINUED | OUTPATIENT
Start: 2024-05-14 | End: 2024-05-14

## 2024-05-14 RX ADMIN — HEPARIN SODIUM 5000 UNITS: 5000 INJECTION, SOLUTION INTRAVENOUS; SUBCUTANEOUS at 06:34

## 2024-05-14 RX ADMIN — METHOCARBAMOL TABLETS 750 MG: 750 TABLET, COATED ORAL at 18:10

## 2024-05-14 RX ADMIN — MIRTAZAPINE 7.5 MG: 15 TABLET, FILM COATED ORAL at 21:05

## 2024-05-14 RX ADMIN — HYDROMORPHONE HYDROCHLORIDE 1 MG: 1 INJECTION, SOLUTION INTRAMUSCULAR; INTRAVENOUS; SUBCUTANEOUS at 12:04

## 2024-05-14 RX ADMIN — INSULIN LISPRO 2 UNITS: 100 INJECTION, SOLUTION INTRAVENOUS; SUBCUTANEOUS at 09:28

## 2024-05-14 RX ADMIN — HEPARIN SODIUM 5000 UNITS: 5000 INJECTION, SOLUTION INTRAVENOUS; SUBCUTANEOUS at 21:05

## 2024-05-14 RX ADMIN — LORATADINE 10 MG: 10 TABLET ORAL at 18:09

## 2024-05-14 RX ADMIN — CEFEPIME 2 G: 2 INJECTION, POWDER, FOR SOLUTION INTRAVENOUS at 01:44

## 2024-05-14 RX ADMIN — SENNOSIDES 8.6 MG: 8.6 TABLET, FILM COATED ORAL at 18:09

## 2024-05-14 RX ADMIN — INSULIN LISPRO 2 UNITS: 100 INJECTION, SOLUTION INTRAVENOUS; SUBCUTANEOUS at 12:13

## 2024-05-14 RX ADMIN — SODIUM BICARBONATE 80 ML/HR: 84 INJECTION, SOLUTION INTRAVENOUS at 14:38

## 2024-05-14 RX ADMIN — HEPARIN SODIUM 5000 UNITS: 5000 INJECTION, SOLUTION INTRAVENOUS; SUBCUTANEOUS at 14:39

## 2024-05-14 RX ADMIN — CEFEPIME 2 G: 2 INJECTION, POWDER, FOR SOLUTION INTRAVENOUS at 09:22

## 2024-05-14 RX ADMIN — DAPTOMYCIN IN SODIUM CHLORIDE 500 MG: 500 INJECTION, SOLUTION INTRAVENOUS at 18:10

## 2024-05-14 RX ADMIN — METHADONE HYDROCHLORIDE 40 MG: 10 TABLET ORAL at 14:39

## 2024-05-14 RX ADMIN — GABAPENTIN 400 MG: 400 CAPSULE ORAL at 21:05

## 2024-05-14 ASSESSMENT — COGNITIVE AND FUNCTIONAL STATUS - GENERAL
MOVING FROM LYING ON BACK TO SITTING ON SIDE OF FLAT BED WITH BEDRAILS: TOTAL
HELP NEEDED FOR BATHING: TOTAL
DRESSING REGULAR UPPER BODY CLOTHING: TOTAL
MOVING TO AND FROM BED TO CHAIR: TOTAL
TOILETING: TOTAL
CLIMB 3 TO 5 STEPS WITH RAILING: TOTAL
DAILY ACTIVITIY SCORE: 6
DRESSING REGULAR LOWER BODY CLOTHING: TOTAL
MOBILITY SCORE: 6
EATING MEALS: TOTAL
WALKING IN HOSPITAL ROOM: TOTAL
STANDING UP FROM CHAIR USING ARMS: TOTAL
PERSONAL GROOMING: TOTAL
TURNING FROM BACK TO SIDE WHILE IN FLAT BAD: TOTAL

## 2024-05-14 ASSESSMENT — PAIN SCALES - GENERAL
PAINLEVEL_OUTOF10: 8
PAINLEVEL_OUTOF10: 7

## 2024-05-14 ASSESSMENT — ENCOUNTER SYMPTOMS
ENDOCRINE NEGATIVE: 1
MUSCULOSKELETAL NEGATIVE: 1
EYES NEGATIVE: 1
NEUROLOGICAL NEGATIVE: 1
WOUND: 1
CARDIOVASCULAR NEGATIVE: 1
RESPIRATORY NEGATIVE: 1
CONSTITUTIONAL NEGATIVE: 1
GASTROINTESTINAL NEGATIVE: 1

## 2024-05-14 ASSESSMENT — PAIN - FUNCTIONAL ASSESSMENT
PAIN_FUNCTIONAL_ASSESSMENT: 0-10
PAIN_FUNCTIONAL_ASSESSMENT: WONG-BAKER FACES

## 2024-05-14 ASSESSMENT — PAIN DESCRIPTION - LOCATION: LOCATION: BACK

## 2024-05-14 ASSESSMENT — PAIN SCALES - WONG BAKER: WONGBAKER_NUMERICALRESPONSE: HURTS EVEN MORE

## 2024-05-14 NOTE — PROGRESS NOTES
Sara Santiago is a 73 y.o. female on day 0 of admission presenting with Clogged feeding tube.      Subjective   HPI: This is a 73 y.o. female who was sent to the ER for a clogged NG tube.       74 y/o woman pmhx sig for T2DM last A1c 6.7, COPD, hx renal Ca s/p partial nephrectomy, hx breast Ca s/p b/l implants, recent metro admission 3/14-4/1/24 to [facility] after fall from chair w/ L2 b/l pedicle fracture and underwent T11-L4 reduction and fusion 3/22. Discharged to SNF 4/1,      Patient has had the NG tube the last few weeks as her swallowing was very poor following the surgery to her back.  In the last week patient has been eating better per daughter who was at bedside.  Speech therapy had seen the patient discussed with the speech therapist will discontinue the NG tube and wait for a MBS to be done tomorrow and determine the need for the NGT after that.  Patient is a very poor historian unable to offer any history     ED HPI : 73-year-old female with complicated past medical history including dysphagia requiring NG tube feedings, type 2 diabetes, COPD, CKD, hypothyroidism, depression, kidney cancer status post resection and advanced directive of DNR CC is brought to the emergency department from her nursing home at [facility] with a chief complaint of clogged feeding tube. The patient has a Dobbhoff feeding tube that has not been flushing or returning of fluid for a day. The patient is confused and does not verbalize if she has any pain or discomfort.    Objective     Last Recorded Vitals  /53 (BP Location: Left arm, Patient Position: Lying)   Pulse 82   Temp 36.7 °C (98.1 °F) (Oral)   Resp 18   Wt 68.8 kg (151 lb 10.8 oz)   SpO2 99%   Intake/Output last 3 Shifts:    Intake/Output Summary (Last 24 hours) at 5/14/2024 1541  Last data filed at 5/14/2024 1419  Gross per 24 hour   Intake 3180 ml   Output 1025 ml   Net 2155 ml       Admission Weight  Weight: 83.5 kg (184 lb) (05/12/24  2235)    Daily Weight  05/13/24 : 68.8 kg (151 lb 10.8 oz)    Image Results  ECG 12 lead (Ancillary Performed)  Normal sinus rhythm  Normal ECG  When compared with ECG of 03-NOV-2022 17:08,  No significant change was found  See ED provider note for full interpretation and clinical correlation  Confirmed by Ashley Lacy ([id number]) on 3/3/2024 3:02:12 PM      Physical Exam  GENERAL: awake,  Ox2, confused   SKIN: Skin turgor normal. No rashes  HEENT: no epistaxis, Moist mucosa.  NECK: supple  BACK: spine nontender to palpation, No CVAT.  LUNGS: Vesicular breath sounds, with no wheeze, no crepitations.   CARDIAC: REGULAR. S1 and S2; no rubs, no murmur  ABDOMEN: Abdomen soft, non-tender. BS+  EXTREMITIES: No edema, Good capillary refill.   NEURO: Insight GOOD. Motor and sensory exam wnl. No invol movements. No ataxia.  WOUND: Per wound care  MUSCULOSKELETAL: No acute inflammation    Relevant Results  Results for orders placed or performed during the hospital encounter of 05/12/24 (from the past 24 hour(s))   POCT GLUCOSE   Result Value Ref Range    POCT Glucose 174 (H) 74 - 99 mg/dL   POCT GLUCOSE   Result Value Ref Range    POCT Glucose 140 (H) 74 - 99 mg/dL   CBC   Result Value Ref Range    WBC 3.6 (L) 4.4 - 11.3 x10*3/uL    nRBC 0.0 0.0 - 0.0 /100 WBCs    RBC 3.29 (L) 4.00 - 5.20 x10*6/uL    Hemoglobin 9.8 (L) 12.0 - 16.0 g/dL    Hematocrit 31.2 (L) 36.0 - 46.0 %    MCV 95 80 - 100 fL    MCH 29.8 26.0 - 34.0 pg    MCHC 31.4 (L) 32.0 - 36.0 g/dL    RDW 16.3 (H) 11.5 - 14.5 %    Platelets 67 (L) 150 - 450 x10*3/uL   Comprehensive Metabolic Panel   Result Value Ref Range    Glucose 175 (H) 65 - 99 mg/dL    Sodium 139 133 - 145 mmol/L    Potassium 3.4 3.4 - 5.1 mmol/L    Chloride 110 (H) 97 - 107 mmol/L    Bicarbonate 16 (L) 24 - 31 mmol/L    Urea Nitrogen 40 (H) 8 - 25 mg/dL    Creatinine 2.00 (H) 0.40 - 1.60 mg/dL    eGFR 26 (L) >60 mL/min/1.73m*2    Calcium 8.7 8.5 - 10.4 mg/dL    Albumin 2.2 (L) 3.5 - 5.0 g/dL     Alkaline Phosphatase 136 (H) 35 - 125 U/L    Total Protein 7.3 5.9 - 7.9 g/dL    AST 20 5 - 40 U/L    Bilirubin, Total 0.3 0.1 - 1.2 mg/dL    ALT 10 5 - 40 U/L    Anion Gap 13 <=19 mmol/L   POCT GLUCOSE   Result Value Ref Range    POCT Glucose 181 (H) 74 - 99 mg/dL   Blood Gas Arterial Full Panel   Result Value Ref Range    POCT pH, Arterial 7.32 (L) 7.38 - 7.42 pH    POCT pCO2, Arterial 30 (L) 38 - 42 mm Hg    POCT pO2, Arterial 91 85 - 95 mm Hg    POCT SO2, Arterial 100 94 - 100 %    POCT Oxy Hemoglobin, Arterial 93.9 (L) 94.0 - 98.0 %    POCT Hematocrit Calculated, Arterial 31.0 (L) 36.0 - 46.0 %    POCT Sodium, Arterial 134 (L) 136 - 145 mmol/L    POCT Potassium, Arterial 3.4 (L) 3.5 - 5.3 mmol/L    POCT Chloride, Arterial 111 (H) 98 - 107 mmol/L    POCT Ionized Calcium, Arterial 1.31 1.10 - 1.33 mmol/L    POCT Glucose, Arterial 322 (H) 74 - 99 mg/dL    POCT Lactate, Arterial 1.1 0.4 - 2.0 mmol/L    POCT Base Excess, Arterial -9.5 (L) -2.0 - 3.0 mmol/L    POCT HCO3 Calculated, Arterial 15.5 (L) 22.0 - 26.0 mmol/L    POCT Hemoglobin, Arterial 10.2 (L) 12.0 - 16.0 g/dL    POCT Anion Gap, Arterial 11 10 - 25 mmo/L    Patient Temperature 37.0 degrees Celsius    FiO2 21 %    Site of Arterial Puncture Radial Right     Eusebio's Test Positive    POCT GLUCOSE   Result Value Ref Range    POCT Glucose 191 (H) 74 - 99 mg/dL   POCT GLUCOSE   Result Value Ref Range    POCT Glucose 79 74 - 99 mg/dL     Scheduled medications  [START ON 5/15/2024] cefepime, 2 g, intravenous, q24h  daptomycin, 8 mg, intravenous, q48h  heparin (porcine), 5,000 Units, subcutaneous, q8h  insulin lispro, 0-10 Units, subcutaneous, TID  methadone, 40 mg, oral, q12h      Continuous medications  dextrose 5%-0.45 % sodium chloride 1,000 mL with potassium chloride 20 mEq, sodium bicarbonate 75 mEq infusion, 80 mL/hr, Last Rate: 80 mL/hr (05/14/24 1438)      PRN medications  PRN medications: dextrose, dextrose, glucagon, glucagon, ipratropium-albuteroL,  morphine, ondansetron            Assessment/Plan        # Dysphagia  -MBS tomorrow  -May need NG to be placed     # 1. S/p enterobacter surgical site infection 4/12/2024  _L2 pedicle fracture, s/p T11-L4 fusion  _ Lumbar osteomyelitis   -On IV antibiotics will continue the same     #DM2 / neuropathy  -Insulin sliding scale  -Patient is n.p.o. and hence cannot get the gabapentin     # Opoid use disorder on methadone   -Patient has chronic pain also  -Has been on methadone for several years     # Hypertension  -IV hydralazine as needed     # CKD/HTAD  -IV fluids       5/14-wound care input noted, patient has lumbar spine incision dehiscence, seen by surgery, recommend transfer to [facility] where the surgery was initially done.  Called daughter to obtain consent, unable to reach her and left a message will try again later.  Patient has been eating well, she has not been cooperative for MBS assessment by speech, but per RN and staff patient has been safe to eat.  May not need NG tube replaced, will plan to transfer to [facility] when I obtain consent for the same from the daughter.  Simon Delgado MD

## 2024-05-14 NOTE — CONSULTS
Inpatient consult to Infectious Diseases  Consult performed by: Consuelo Tuttle, APRN-CNP  Consult ordered by: Simon Delgado MD  Reason for consult: Lumbar osteomyelits          Primary MD: Simon Delgado MD        History Of Present Illness  Sara Santiago is a 73 y.o. female with past medical history of type 2 diabetes mellitus, renal cancer, status post partial nephrectomy, breast cancer, status post bilateral mastectomy with reconstruction, spinal surgery in March after fall.  Subsequent thoracolumbar infection in April, underwent wash out, culture grew pseudomonas, Citrobacter, Proteus Mirabilis, Enterococcus faecalis.  She was discharged on IV cefepime till 5/28/2024.  History of dysphagia requiring feeding tube.  She presented to the emergency room with malfunctioning NG tube.  She was found to have spinal wound dehiscence.  She is awake, confused, cooperative, restrained.  She is afebrile, on room air with normal oxygenation.  She denies shortness of breath, does have mild nonproductive cough.  She has sacral pressure injury with nonblanchable erythema.  She has back incision with  wound dehiscence, yellow slough, ronald wound erythema.  She has bilateral inner thigh wounds.  Labs with Leukopenia.  Acute on Chronic kidney disease.  Urine culture growing E. Faecium.   She is on IV cefepime.     Past Medical History  She has a past medical history of Abnormal findings on diagnostic imaging of other abdominal regions, including retroperitoneum, Abnormal findings on diagnostic imaging of other specified body structures, Candidiasis of skin and nail (09/03/2019), Cellulitis of left lower limb (08/16/2021), Cellulitis of left toe (06/30/2021), Cellulitis of right lower limb (03/08/2021), Central retinal vein occlusion, left eye, stable (CMS-HCC) (03/21/2018), Cramp and spasm (08/16/2021), Encounter for immunization (03/21/2018), Encounter for immunization (03/21/2018), Other conditions influencing health  status (03/21/2018), Other injury of unspecified body region, initial encounter (12/15/2017), Other injury of unspecified body region, initial encounter (01/10/2019), Other visual disturbances (03/21/2018), Personal history of diseases of the skin and subcutaneous tissue (11/24/2020), Personal history of other diseases of the respiratory system (07/05/2019), Personal history of other medical treatment, Personal history of other specified conditions (06/03/2020), Personal history of other specified conditions (07/07/2020), Personal history of other specified conditions (09/06/2019), Personal history of other specified conditions (07/22/2020), Unspecified fall, initial encounter (06/04/2018), and Vitamin D deficiency, unspecified (06/04/2018).    Surgical History  She has a past surgical history that includes Other surgical history (05/10/2022); Other surgical history (01/27/2022); and CT angio neck (10/24/2023).     Social History     Occupational History    Not on file   Tobacco Use    Smoking status: Former     Types: Cigarettes    Smokeless tobacco: Never   Substance and Sexual Activity    Alcohol use: Never    Drug use: Not Currently     Types: Marijuana    Sexual activity: Not on file     Travel History   Travel since 04/14/24    No documented travel since 04/14/24              Family History  Family History   Problem Relation Name Age of Onset    Diabetes Other multiple Family Members      Allergies  Other and Sulfa (sulfonamide antibiotics)     Immunization History   Administered Date(s) Administered    Flu vaccine (IIV4), preservative free *Check age/dose* 11/09/2017    Flu vaccine, quadrivalent, high-dose, preservative free, age 65y+ (FLUZONE) 10/22/2018, 10/29/2019, 10/19/2023    Hepatitis A vaccine, age 19 years and greater (HAVRIX) 04/16/2019    Influenza, High Dose Seasonal, Preservative Free 10/22/2018    Pfizer Purple Cap SARS-CoV-2 03/18/2021, 04/08/2021    Pneumococcal polysaccharide vaccine,  23-valent, age 2 years and older (PNEUMOVAX 23) 06/26/2022    Tdap vaccine, age 7 year and older (BOOSTRIX, ADACEL) 02/07/2022     Medications  Home medications:  Medications Prior to Admission   Medication Sig Dispense Refill Last Dose    acetaminophen (Tylenol) 500 mg tablet Take 2 tablets (1,000 mg) by mouth every 6 hours. 180 tablet 3     aspirin 81 mg EC tablet Take 1 tablet (81 mg) by mouth once daily. 90 tablet 3     atorvastatin (Lipitor) 40 mg tablet Take by mouth once daily.       bisacodyl (Dulcolax) 10 mg suppository Insert into the rectum once daily as needed for constipation.       blood sugar diagnostic (True Metrix Glucose Test Strip) strip in the morning, at noon, and at bedtime.       calcitriol (Rocaltrol) 0.25 mcg capsule TAKE 1 CAPSULE BY MOUTH EVERY DAY 30 capsule 11     cefepime (Maxipime) IV Infuse 100 mL (2 g) into a venous catheter every 8 hours. For infection       cetirizine (ZyrTEC) 10 mg tablet Take 0.5 tablets (5 mg) by mouth once daily.       cholecalciferol (Vitamin D-3) 25 MCG (1000 UT) tablet Take 1 tablet (1,000 Units) by mouth once daily. 90 tablet 3     diaper,brief,adult,disposable misc USE AS NEEDED 96 each 11     diclofenac sodium 1 % kit apply to affected areas 2-3 times daily 1 each 11     docusate sodium (Colace) 100 mg capsule Take 1 mg by mouth 2 times a day.       eucerin (Minerin Creme) cream APPLY TO BOTH LEGS TWICE A  g 0     fluconazole (Diflucan) 100 mg tablet Take 1 tablet (100 mg) by mouth once daily. For 7 days       fluticasone (Flovent) 110 mcg/actuation inhaler Inhale 2 puffs 2 times a day. RINSE MOUTH AFTER USE. 12 g 11     fluticasone propion-salmeteroL (Advair Diskus) 500-50 mcg/dose diskus inhaler Inhale 1 puff every 12 hours. Rinse mouth with water after use to reduce aftertaste and incidence of candidiasis. Do not swallow.       gabapentin (Neurontin) 400 mg capsule Take 1 capsule (400 mg) by mouth 2 times a day.       gabapentin (Neurontin) 800  "mg tablet Take 1 tablet (800 mg) by mouth 2 times a day. (Patient not taking: Reported on 5/13/2024) 90 capsule 3 Not Taking    Heartburn Relief, famotidine, 10 mg tablet TAKE 1 TABLET BY MOUTH EVERYDAY AT BEDTIME (Patient not taking: Reported on 5/13/2024) 30 tablet 11 Not Taking    insulin glargine (Basaglar KwikPen U-100 Insulin) 100 unit/mL (3 mL) pen Inject 17 Units under the skin once daily. Take as directed per insulin instructions.       insulin lispro (HumaLOG) 100 unit/mL injection Inject under the skin 3 times a day with meals. Take as directed per insulin instructions.       lancets 30 gauge misc USE AS DIRECTED.       latanoprost (Xalatan) 0.005 % ophthalmic solution Administer 1 drop into both eyes once daily at bedtime.       levothyroxine (Synthroid, Levoxyl) 25 mcg tablet Take 1 tablet (25 mcg) by mouth once every day. 90 tablet 3     lidocaine 4 % cream Apply topically 2 times a day as needed for mild pain (1 - 3). 28 g 3     lidocaine 4 % patch Place 1 patch on the skin once every day.       loratadine (Claritin) 10 mg tablet TAKE 1 TABLET BY MOUTH EVERY DAY AS NEEDED 30 tablet 6     methadone (Dolophine) 10 mg/5 mL solution Take 80 mL (160 mg) by mouth once daily.       methocarbamol (Robaxin) 750 mg tablet Take 1 tablet (750 mg) by mouth every 6 hours during the day.       mirtazapine (Remeron) 7.5 mg tablet Take 1 tablet (7.5 mg) by mouth once daily at bedtime.       multivitamin tablet Take 1 tablet by mouth once daily. 90 tablet 3     ondansetron ODT (Zofran-ODT) 4 mg disintegrating tablet Take 1 tablet (4 mg) by mouth every 6 hours if needed for nausea or vomiting.       pen needle 1/2\" 29G X 12mm needle USE AS DIRECTED. 100 each 11     polyethylene glycol (Miralax) 17 gram/dose powder Take 17 g by mouth once daily as needed (constipation). 510 g 11     Sacch boulardi-Bacill coag-FOS (Diff-Stat) 471 mg capsule Take by mouth.       sennosides (Senokot) 8.6 mg tablet Take 1 tablet (8.6 mg) by " "mouth once daily.       sertraline (Zoloft) 100 mg tablet Take 2 tablets (200 mg) by mouth once daily. (Patient taking differently: Take 1 tablet (100 mg) by mouth once daily.) 90 tablet 3     simethicone (Mylicon) 80 mg chewable tablet Chew 1 tablet (80 mg) every 6 hours if needed for flatulence.       traZODone (Desyrel) 50 mg tablet Take 1 tablet (50 mg) by mouth once daily at bedtime.       white petrolatum 41 % ointment ointment Apply 1 Application topically 3 times a day. 85 g 1      Current medications:  Scheduled medications  [START ON 5/15/2024] cefepime, 2 g, intravenous, q24h  heparin (porcine), 5,000 Units, subcutaneous, q8h  insulin lispro, 0-10 Units, subcutaneous, TID  methadone, 40 mg, oral, q12h      Continuous medications  dextrose 5%-0.45 % sodium chloride 1,000 mL with potassium chloride 20 mEq, sodium bicarbonate 75 mEq infusion, 80 mL/hr, Last Rate: 80 mL/hr (05/14/24 1438)      PRN medications  PRN medications: dextrose, dextrose, glucagon, glucagon, ipratropium-albuteroL, morphine, ondansetron    Review of Systems   Constitutional: Negative.    HENT: Negative.     Eyes: Negative.    Respiratory: Negative.     Cardiovascular: Negative.    Gastrointestinal: Negative.    Endocrine: Negative.    Genitourinary: Negative.    Musculoskeletal: Negative.    Skin:  Positive for wound.   Neurological: Negative.         Objective  Range of Vitals (last 24 hours)  Heart Rate:  [80-97]   Temp:  [36.6 °C (97.9 °F)-36.7 °C (98.1 °F)]   Resp:  [18]   BP: (111-146)/(47-69)   Height:  [157.5 cm (5' 2\")]   Weight:  [68.8 kg (151 lb 10.8 oz)]   SpO2:  [97 %-99 %]   Daily Weight  05/13/24 : 68.8 kg (151 lb 10.8 oz)    Body mass index is 27.74 kg/m².     Physical Exam  Constitutional:       Appearance: Normal appearance. She is ill-appearing.   HENT:      Head: Normocephalic and atraumatic.      Nose: Nose normal.      Mouth/Throat:      Mouth: Mucous membranes are moist.      Pharynx: Oropharynx is clear.   Eyes: "      Conjunctiva/sclera: Conjunctivae normal.   Cardiovascular:      Rate and Rhythm: Normal rate and regular rhythm.   Pulmonary:      Effort: Pulmonary effort is normal.      Breath sounds: Normal breath sounds.   Abdominal:      General: Bowel sounds are normal.      Palpations: Abdomen is soft.   Musculoskeletal:         General: Normal range of motion.      Cervical back: Normal range of motion and neck supple.      Comments: Bilateral soft wrist restraints    Skin:     Comments: Picture in EPIC reviewed -sacral pressure injury with nonblanchable erythema.  Thoracolumbar  incision with wound dehiscence, yellow slough, ronald wound erythema.  bilateral inner thigh wounds, excoriation    Neurological:      Mental Status: She is alert.      Comments: Awake, confused   Psychiatric:      Comments: Cooperative           Relevant Results  Outside Hospital Results  No  Labs  Results from last 72 hours   Lab Units 05/14/24 0502 05/13/24 0549 05/13/24 0041   WBC AUTO x10*3/uL 3.6* 5.4 7.2   HEMOGLOBIN g/dL 9.8* 10.4* 12.0   HEMATOCRIT % 31.2* 34.4* 38.9   PLATELETS AUTO x10*3/uL 67* 82* 102*   NEUTROS PCT AUTO %  --   --  70.5   LYMPHS PCT AUTO %  --   --  19.2   MONOS PCT AUTO %  --   --  7.2   EOS PCT AUTO %  --   --  2.2     Results from last 72 hours   Lab Units 05/14/24 0502 05/13/24 0549 05/13/24 0041   SODIUM mmol/L 139 136 136   POTASSIUM mmol/L 3.4 4.0 5.0   CHLORIDE mmol/L 110* 106 105   CO2 mmol/L 16* 16* 17*   BUN mg/dL 40* 42* 40*   CREATININE mg/dL 2.00* 1.80* 1.70*   GLUCOSE mg/dL 175* 186* 162*   CALCIUM mg/dL 8.7 9.2 9.6   ANION GAP mmol/L 13 14 14   EGFR mL/min/1.73m*2 26* 29* 32*     Results from last 72 hours   Lab Units 05/14/24 0502 05/13/24  0549 05/13/24 0041   ALK PHOS U/L 136* 158* 178*   BILIRUBIN TOTAL mg/dL 0.3 0.3 0.4   PROTEIN TOTAL g/dL 7.3 8.1* 8.8*   ALT U/L 10 11 13   AST U/L 20 21 30   ALBUMIN g/dL 2.2* 2.4* 2.6*     Estimated Creatinine Clearance: 22.8 mL/min (A) (by C-G formula  based on SCr of 2 mg/dL (H)).  CRP   Date Value Ref Range Status   06/24/2022 0.97 mg/dL Final     Comment:     REF VALUE  < 1.00     03/14/2022 1.04 (A) mg/dL Final     Comment:     REF VALUE  < 1.00     06/02/2020 1.58 (A) mg/dL Final     Comment:     REF VALUE  < 1.00       Sedimentation Rate   Date Value Ref Range Status   06/24/2022 57 (H) 0 - 30 mm/h Final     Comment:     Please note new reference ranges as of 5/9/2022.   03/14/2022 49 (H) 0 - 30 mm/h Final   06/02/2020 54 (H) 0 - 30 mm/h Final     HIV 1/2 Antigen/Antibody Screen with Reflex to Confirmation   Date Value Ref Range Status   02/20/2024 Nonreactive Nonreactive Final     Hepatitis C AB   Date Value Ref Range Status   02/20/2024 Reactive (A) Nonreactive Final     Comment:     HCV antibody detected. HCV RNA PCR has been ordered to evaluate the possibility of a current infection.     Results from patients taking biotin supplements or receiving high-dose biotin therapy should be interpreted with caution due to possible interference with this test. Providers may contact their local laboratory for further information.     HCV PCR Quant   Date Value Ref Range Status   05/12/2023 NOT DETECTED IU/mL Final     Comment:     REF VALUE  NOT DETECTED       Microbiology  Susceptibility data from last 90 days.  Collected Specimen Info Organism Amoxicillin/Clavulanate Ampicillin Ampicillin/Sulbactam Cefazolin Cefazolin (uncomplicated UTIs only) Ciprofloxacin Gentamicin Nitrofurantoin Piperacillin/Tazobactam Trimethoprim/Sulfamethoxazole   05/13/24 Urine from Indwelling ([place]) Catheter Enterococcus faecium             02/23/24 Urine from Clean Catch/Voided Klebsiella pneumoniae/variicola S R S S S S S S S S     Aerococcus urinae                   Imaging  XA PICC W/O PORT INC IMG >4Y/O    Result Date: 4/24/2024  EXAMINATION: XA PICC W/O PORT INC IMG >4Y/O 04/19/2024 05:42 PM CLINICAL HISTORY: Rad Procedure required: = PICC placement,long term abx ASSOCIATED  DIAGNOSIS: ORDERING PROVIDER: JONNY BLACKBURN TECHNOLOGISTS NOTE:  Right arm 29cm single picc FLUOROSCOPIST:  VÍCTOR SMITH TIME: .2 Minutes ATTENDING PHYSICIAN: Leonel Sifuentes RESIDENT/FELLOW PHYSICIAN: Víctor Smith INFORMED CONSENT: Written informed consent was obtained. The procedure, risks, benefits, and alternatives were discussed. All questions were answered. TIMEOUT: Physician led timeout was conducted documenting correct patient, procedure, site, fire risk, antibiotics and allergies. COMPLICATIONS: None ESTIMATED BLOOD LOSS: Less than 10 mL TECHNIQUE: The patient was positioned supine on the angiography table. The right arm was prepped and draped in usual aseptic fashion. Lidocaine was used for local anesthesia. A 21 ga. micro puncture needle was used to access the right basilic vein, using realtime ultrasound guidance. An ultrasound spot image was obtained. A 0.018 inch access wire was passed through the needle and the needle removed. A peel-away introducer sheath was placed over the wire over its matched dilator and the dilator was removed.  A chlorhexidine gluconate-coated catheter was trimmed to an appropriate length and passed through the peel-away sheath. The peel-away was removed. A fluoroscopic spot image was obtained. The catheter was aspirated and flushed with normal saline. The catheter was sutured in place. Sterile dressings were placed. The patient tolerated the procedure well without immediate complication and was transferred from the angiography suite in stable condition. Catheter type: Single lumen 4.5 Tamazight Final catheter length: 29 cm FINDINGS: Ultrasound spot image demonstrates patency of the target vein. Fluoroscopic spot image show the tip of the catheter projecting over the region of the atriocaval junction. IMPRESSION: Technically successful uncomplicated PICC line placement. MACRO: None    XR ABDOMEN AP 1 VIEW    Result Date: 4/23/2024  EXAMINATION: XR ABDOMEN AP 1 VIEW 04/23/2024 03:40  AM CLINICAL HISTORY: Step 2 of Corpak 2 Step process ASSOCIATED DIAGNOSIS: ORDERING PROVIDER: SHYANN WORRELL TECHNOLOGISTS NOTE: COMPARISON: XR ABDOMEN AP 1 VIEW 4/17/2024, 10:25 AM FINDINGS: IMPRESSION: 1.  [place]ak tube projects over the left upper quadrant with metallic weighted tip directed inferiorly, suitable for use. 2.  Nonspecific bowel gas pattern. 3.  Partial visualization of thoracolumbar posterior fusion construct without evidence for complication. MACRO: None    XR chest 1 view    Result Date: 4/23/2024  EXAMINATION: XR CHEST AP OR PA 1 VIEW 04/23/2024 03:13 AM CLINICAL HISTORY: Feeding tube assessment ASSOCIATED DIAGNOSIS: Feeding tube assessment ORDERING PROVIDER: SHYANN WORRELL TECHNOLOGISTS NOTE: COMPARISON: XR CHEST AP OR PA 1 VIEW 4/16/2024, 1:25 PM FINDINGS: Position and projection: AP erect. Limitations: None. Clinical considerations: Obtained from EMR. Lines, tubes, and devices: Enteral feeding tube terminates overlying the region of the mid to distal thoracic esophagus, below the level of the baljeet. Right internal jugular central venous catheter terminates overlying superior right atrial region. Right upper extremity PICC line terminates overlying the proximal superior vena cava region. Cardiomediastinal silhouette: Normal heart size. Aorta: Atherosclerotic calcification. Diaphragm: Elevation of the right hemidiaphragm with the right hemidiaphragm approximately 4 cm higher than the left. Lungs and pleura: Medial basilar left lower lobe atelectasis/consolidation. No sizable pleural effusion or pneumothorax. Osseous structures: Lower thoracic and lumbar posterior spinal fixation hardware. IMPRESSION: 1.  Enteral feeding tube terminates overlying the region of the mid to distal thoracic esophagus, below the level of the baljeet. This should be advanced into the duodenum for optimal position. 2.  Right internal jugular central venous catheter terminates overlying superior right atrial region. This  can be retracted 3 cm for more optimal position. 3.  Right upper extremity PICC line terminates overlying the proximal superior vena cava region. This can be advanced 5 cm for more optimal position. 4.  Medial basilar left lower lobe atelectasis/consolidation. 5.  Elevation of the right hemidiaphragm. 6.  Status post lower thoracic and lumbar posterior spinal fixation hardware. MACRO: None    CT angio chest w and wo IV contrast    Result Date: 4/19/2024  EXAMINATION: CTA CHEST PULMONARY EMBOLISM W/ 04/19/2024 03:29 PM CLINICAL HISTORY: Bedridden at least 3 days or major surgery within 4 weeks; Hypoxia; Pulmonary embolism suspected; S/p surgery (w/ general anesthesia) or trauma within 4 weeks; Tachycardia (HR more than 100 bpm) ASSOCIATED DIAGNOSIS: Bedridden at least 3 days or major surgery within 4 weeks Hypoxia Pulmonary embolism suspected S/p surgery (w/ general anesthesia) or trauma within 4 weeks Tachycardia (HR more than 100 bpm) ORDERING PROVIDER: LORENZO JI TECHNOLOGISTS NOTE: COMPARISON: CT CHEST/ABD/PELVIS W/ CONTRAST 3/14/2024, 7:49 PM CT CHEST/ABD/PELVIS W/ CONTRAST 10/24/2023, 10:58 AM TECHNIQUE: Axial images of the chest were obtained from above the lung apices through the level of the adrenal glands during the bolus administration of intravenous contrast. Multiplanar and 3D maximum intensity projection reformulation's were created from the raw CT data which were interpreted in conjunction with the axial images to render the findings listed below. Before infusion of intravenous contrast, radiology personnel investigated the possibility of an allergic history and of any history of reaction to iodinated contrast material. Contrast Protocol: Omnipaque 350 [>or =100lb] 100 ml [<100 lb] 1 ml per 1 lb. INTRA-PROCEDURE MEDS: iohexol (OMNIPAQUE) 350 MG/ML injection 100 mL Route: Intravenous Push FINDINGS: Exam Quality: Overall exam quality is satisfactory. Pulmonary arterial enhancement is adequate, the  breath hold is adequate, and there are no significant artifacts impacting image quality. Pulmonary Arteries: There are no filling defects within the pulmonary arterial system to suggest pulmonary embolus. Cardiovasculature: The heart is enlarged in size. Atherosclerotic calcifications are present within the coronary arteries. Mediastinum/Pericardium: Unremarkable. Thyroid: Stable hypodense nodules. Pleura: Small left pleural effusion. Trace right pleural effusion. Lungs: Debris within the lower trachea extending to the left mainstem bronchus and occluding the right lower lobe bronchial tree, with associated post obstructive collapse of the left lower lobe. There are new patchy groundglass opacities, primarily in the upper and mid lungs, and right greater than left. Nodules: No nodules are present that require follow up. Lymph Nodes: No thoracic lymphadenopathy is evident. Visualized musculoskeletal structures: No acute fracture or destructive osseous lesion is identified. Included images of the upper abdomen: Please see the separately dictated CT of the abdomen. IMPRESSION: 1.  Negative for acute pulmonary embolus.   2.  Postobstructive collapse of the left lower lobe secondary to occlusive debris in the bronchial tree. Superimposed infectious process cannot be excluded. Small left pleural effusion. 3.  New patchy opacities in the bilateral lungs, right greater than left, and predominantly involving the upper and mid lungs, nonspecific but suggestive of atypical infectious process.   MACRO: None    CT thoracic spine wo IV contrast    Result Date: 4/19/2024  EXAMINATION: CT T-SPINE W/O CONTRASTPRO/PRO   04/19/2024 03:29 PM CLINICAL HISTORY: Tachycardia (HR more than 100 bpm) ASSOCIATED DIAGNOSIS: Tachycardia (HR more than 100 bpm) ORDERING PROVIDER: LORENZO JI TECHNOLOGISTS NOTE: COMPARISON: None TECHNIQUE: Thin axial images were obtained through the thoracic region without intravenous contrast. 2D sagittal and  coronal reconstructions were obtained from the axial data. FINDINGS: Fixation hardware is partially visible from T11 extending inferiorly. The visible portion of the hardware is intact without signs of lucency. Please see the separately dictated CT of the lumbar spine. Thoracic vertebral body height is intact. No fracture or aggressive osseous lesion. Moderate degenerative changes are visible lower cervical spine. Please see the separately dictated CT of the chest for additional findings. IMPRESSION: No acute fracture or malalignment of the thoracic spine. MACRO: None    CT abdomen pelvis w IV contrast    Result Date: 4/19/2024  EXAMINATION: CT ABDOMEN/PELVIS W/ CONTRAST 04/19/2024 03:29 PM CLINICAL HISTORY: Tachycardia (HR more than 100 bpm) ASSOCIATED DIAGNOSIS: Tachycardia (HR more than 100 bpm) ORDERING PROVIDER: LORENZO JI TECHNOLOGISTS NOTE: COMPARISON: CT CHEST/ABD/PELVIS W/ CONTRAST 3/14/2024, 7:49 PM CT ABD/PELVIS ED I/V LUIS W/ 10/16/2018, 12:01 AM XR T-SPINE/L-SPINE JNCT ONLY 2 VIEWS 3/27/2024, 2:23 PM TECHNIQUE: Contiguous axial images were obtained through the abdomen and pelvis from the level of the diaphragmatic domes through the pubic symphysis following bolus administration of intravenous contrast. MPR sagittal and coronal reconstructions were obtained from the axial data. Before infusion of intravenous contrast, radiology personnel investigated the possibility of an allergic history and of any history of reaction to iodinated contrast material. Contrast Protocol: Omnipaque 350 [>or =100lb] 100 ml [<100 lb] 1 ml per 1 lb. INTRA-PROCEDURE MEDS: iohexol (OMNIPAQUE) 350 MG/ML injection 100 mL Route: Intravenous Push FINDINGS: Suboptimal study due to streak artifact from spinal hardware and enteric tube distal tip. Included images of the lower thorax: Please see the separately dictated CT of the chest. Hepatobiliary: Mild intrahepatic and extrahepatic biliary dilatation with the common bile duct  measures up to 8 mm in diameter, grossly unchanged dating back to 2018. Pancreas: Unremarkable Spleen: The spleen is enlarged measuring 14 cm. Stable hypodensity at the inferior aspect, likely a cyst or hemangioma. Adrenal Glands: Stable left adrenal nodule, incompletely characterized on this study but previously described as an adenoma. Kidneys, ureters, and bladder: No calculi or hydroureteronephrosis. Stable left renal hypodensities, too small to characterize but likely reflective of cysts. Tiny air bubble within the urinary bladder may relate to recent instrumentation. Abdominal and pelvic vasculature: Atherosclerotic wall calcifications are present without abdominal aortic aneurysm. GI tract: No evidence of obstruction. Peritoneum and retroperitoneum: No free fluid or free air. Lymph Nodes: No abdominal or pelvic lymphadenopathy. Uterus and adnexa: Not well evaluated by CT. Visualized musculoskeletal structures: No acute fracture or destructive osseous lesion is identified. Posterior fusion T11 L4 without evidence of hardware failure. Stable fat-containing umbilical hernia and fat-containing left inguinal hernia. Stable sheetlike dystrophic calcifications in the superficial subcutaneous tissues of the bilateral gluteal regions and the lower abdominal wall. IMPRESSION: No acute process. Stable chronic ancillary findings as above. MACRO: None    CT lumbar spine wo IV contrast    Result Date: 4/19/2024  EXAMINATION: CT L-SPINE W/O CONTRASTPRO/PRO   04/19/2024 03:29 PM CLINICAL HISTORY: Tachycardia (HR more than 100 bpm) ASSOCIATED DIAGNOSIS: Tachycardia (HR more than 100 bpm) ORDERING PROVIDER: LORENZO JI TECHNOLOGISTS NOTE: COMPARISON: CT T-SPINE/L-SPINE W/O CONTRAST 3/14/2024, 7:49 PM TECHNIQUE: Thin axial images were obtained through the lumbar region without intravenous contrast. 2D sagittal and coronal reconstructions were obtained from the axial data. FINDINGS: There is posterior fusion hardware  extending from T11 through L4. From at least T12-L3 there is patchy demineralization at lucency and erosion of the vertebral bodies, with relatively prominent involvement of the endplate inferiorly at L2.. No perihardware lucency. Hardware is intact. Grade 3 spondylolytic L5-S1 spondylolisthesis is again noted. There is solid osseous fusion across the L5-S1 disc space. The bowel L2 pedicle fractures are noted. There is left lower lobe collapse. Trace bilateral pleural effusions. IMPRESSION: No new fracture. Patchy demineralization of the upper lumbar spine with prominent endplate involvement, raise the possibility of osteomyelitis/discitis. Further evaluation with MRI should be considered although imaging be limited by artifact from hardware. Nuclear medicine bone scan could also be considered. MACRO: None    FL modified barium swallow study    Result Date: 4/18/2024  EXAMINATION: FL MODIFIED BARIUM SWALLOW 04/18/2024 02:02 PM CLINICAL HISTORY: dysphagia ASSOCIATED DIAGNOSIS: ORDERING PROVIDER: VIMAL LEWIS TECHNOLOGISTS NOTE: COMPARISON: None FLUOROSCOPIST:  MELANIE HUGHES    FLUORO TIME: 3.3 Minutes TECHNIQUE: The examination was performed in conjunction with the Dysphagia/Speech Pathology Team. Various consistencies of contrast (thin barium, nectar thickened barium and pureed consistency of barium) were administered and deglutition was evaluated in the lateral projection utilizing video fluoroscopy. INTRA-PROCEDURE MEDS: barium Sulfate 40 % 5 oz Route: Oral barium Sulfate 40 % 5 oz Route: Oral Barium Sulfate 60 % 5 g Route: Oral FINDINGS: Oral phase: Normal. Pharyngeal phase: Delayed initiation of the pharyngeal phase of swallowing with the head of the contrast column at the level of the valleculae. Elevation of the larynx and epiglottic inversion: Demonstrated. Laryngeal penetration, tracheal aspiration or nasopharyngeal reflux: None. Significant residual pooling of contrast within the valleculae or pyriform:  Trace residuals of contrast within the valleculae and piriform sinuses. Ancillary findings: Enteric feeding tube. Partially visualized of a right internal jugular central venous catheter. A complete report will also be performed by the Dysphagia/Speech Pathology Team. IMPRESSION: 1.  No laryngeal penetration or tracheal aspiration. 2.  Delayed initiation of the pharyngeal phase of swallowing with the head of the contrast column at the level of the valleculae. 3.  Enteric feeding tube. MACRO: None    XR ABDOMEN AP 1 VIEW    Result Date: 4/17/2024  EXAMINATION: XR ABDOMEN AP 1 VIEW 04/17/2024 10:23 AM CLINICAL HISTORY: bilious vomiting. Tube feeds ASSOCIATED DIAGNOSIS: ORDERING PROVIDER: VIMAL LEWIS TECHNOLOGISTS NOTE: COMPARISON: XR ABDOMEN AP 1 VIEW 4/16/2024, 1:25 PM IMPRESSION: Corpak distal tip overlies the expected position of the distal stomach. Nonspecific, nonobstructive bowel gas pattern. MACRO: None    XR ABDOMEN AP 1 VIEW    Result Date: 4/16/2024  EXAMINATION: XR CHEST AP OR PA 1 VIEW, EXAMINATION: XR ABDOMEN AP 1 VIEW 04/16/2024 01:20 PM (accession [id number]), 04/16/2024 01:21 PM (accession [id number]) CLINICAL HISTORY: Feeding tube assessment ASSOCIATED DIAGNOSIS: Feeding tube assessment ORDERING PROVIDER: JONNY BLACKBURN COMPARISON: Chest x-ray on 4/15/2024. FINDINGS: The chest x-ray was obtained to evaluate step 1 of enteral feeding tube placement. There is a enteral feeding tube in place with expected course above and below the diaphragm; the tip is not included into the field-of-view. The abdominal x-ray was obtained to evaluate step 2 of enteral feeding tube placement. The enteral feeding tube is visualized with expected course above and below the diaphragm, the tip is projecting over the gastric antrum. IMPRESSION: Enteral feeding tube in place with the tip projecting over the gastric antrum. MACRO: None    XR chest 1 view    Result Date: 4/16/2024  EXAMINATION: XR CHEST AP OR PA 1 VIEW,  EXAMINATION: XR ABDOMEN AP 1 VIEW 04/16/2024 01:20 PM (accession [id number]), 04/16/2024 01:21 PM (accession [id number]) CLINICAL HISTORY: Feeding tube assessment ASSOCIATED DIAGNOSIS: Feeding tube assessment ORDERING PROVIDER: JONNY BLACKBURN COMPARISON: Chest x-ray on 4/15/2024. FINDINGS: The chest x-ray was obtained to evaluate step 1 of enteral feeding tube placement. There is a enteral feeding tube in place with expected course above and below the diaphragm; the tip is not included into the field-of-view. The abdominal x-ray was obtained to evaluate step 2 of enteral feeding tube placement. The enteral feeding tube is visualized with expected course above and below the diaphragm, the tip is projecting over the gastric antrum. IMPRESSION: Enteral feeding tube in place with the tip projecting over the gastric antrum. MACRO: None    XR chest 1 view    Result Date: 4/15/2024  EXAMINATION: XR CHEST AP OR PA 1 VIEW 04/15/2024 06:33 AM CLINICAL HISTORY: Respiratory failure ASSOCIATED DIAGNOSIS: Respiratory failure ORDERING PROVIDER: RACHID KHANNA TECHNOLOGISTS NOTE: COMPARISON: XR CHEST AP OR PA 1 VIEW 4/14/2024, 5:46 AM FINDINGS: Position and projection: AP semirecumbent. Limitations: None. Clinical considerations: Obtained from EMR. Lines, tubes, and devices: Right internal jugular central venous catheter terminating overlying superior cavoatrial junction region. Cardiomediastinal silhouette: Normal heart size. Aorta: Atherosclerotic calcification. Diaphragm: Elevation of the right hemidiaphragm with the right hemidiaphragm approximately 4.5 cm higher than the left. Lungs and pleura: No pulmonary consolidation, pleural effusion or pneumothorax. Osseous structures: Lower thoracic and lumbar posterior spinal fixation hardware. Chest wall: Telemetry leads overlying the chest. Right lateral chest wall surgical clips. IMPRESSION: 1.  Removal of the endotracheal and nasogastric tubes and intra-esophageal temperature probe. 2.   Satisfactory position of the right internal jugular central venous catheter. 3.  Elevation of the right hemidiaphragm. 4.  No acute cardiopulmonary findings. 5.  Status post lower thoracic and lumbar posterior spinal fixation hardware. MACRO: None \   Assessment/Plan   E. Faecium Urinary tract infection-susceptibility pending  Recent thoracolumbar post op wound infection  -culture grew pseudomonas, Citrobacter, Proteus Mirabilis, Enterococcus faecalis-IV cefepime till 5/28/2024.  Acute kidney injury on chronic kidney disease stage III  Opioid use disorder on methadone      IV cefepime  IV daptomycin-cover E faecium-susceptibility pending  CK level  Monitor temperature and WBC  Follow up Urine culture  Blood cultures x 2  Monitor Renal function  Nephrology follow-up  General surgery consulted  Local care  Supportive care   Further recommendation based on work up    Consuelo Tuttle, ROSA MARIA-CNP

## 2024-05-14 NOTE — CONSULTS
Assessment/Plan     Inpatient consult to Acute Care Surgery  Consult performed by: ROSA MARIA Ferguson-CNP  Consult ordered by: Simon Delgado MD  Reason for consult: Wound dehiscence of spinal incision  Assessment/Recommendations: Based on history and image in chart, would recommend transfer back to [facility] for incisional care. For now, keep spinal incision clean and dry and covered with dressing. Can use calcium alginate over open areas of incision. Do not place anything into the incision or under the skin. Cover with Mepilex foam dressing. Would also recommend nutritional supplements to promote wound healing.         Subjective     This is a 73 year old female patient with a medical history significant for spinal fusion in March '24 at [facility] with a washout in April '24. She presented to [place] for a clogged PEG. We have been asked to see this patient for wound dehiscence of the spinal incision. Upon evaluation of the patient, she is in bilateral wrist restraints for attempting to hit nurse and grabbing at various items, therefore I was unable to view her incision directly; however, there is an updated wound image on chart media. I was also unable to obtain any information from the patient and therefore this information was obtained from her chart and bedside RN.         Review of Systems  Review of Systems   Reason unable to perform ROS: patient unable to answer questions at this time, disoriented.       Objective     Vital signs for last 24 hours:  Temp:  [36.6 °C (97.9 °F)-36.7 °C (98.1 °F)] 36.7 °C (98.1 °F)  Heart Rate:  [80-97] 82  Resp:  [18] 18  BP: (111-146)/(47-69) 139/53    Intake/Output this shift:  I/O this shift:  In: 874.2 [P.O.:125; I.V.:249.2; IV Piggyback:500]  Out: 300 [Urine:300]    Physical Exam  Physical Exam  Vitals and nursing note reviewed.   Constitutional:       Appearance: Normal appearance. She is ill-appearing.   HENT:      Head: Normocephalic and atraumatic.   Cardiovascular:       Rate and Rhythm: Normal rate.   Pulmonary:      Effort: Pulmonary effort is normal.   Abdominal:      General: Abdomen is flat. Bowel sounds are normal.      Palpations: Abdomen is soft.      Comments: PEG   Musculoskeletal:         General: No swelling or tenderness.   Skin:     General: Skin is warm and dry.      Comments: Spinal incision dehiscence per wound image   Neurological:      Mental Status: She is oriented to person, place, and time.         Labs  Lab Results   Component Value Date    GLUCOSE 175 (H) 05/14/2024    CALCIUM 8.7 05/14/2024     05/14/2024    K 3.4 05/14/2024    CO2 16 (L) 05/14/2024     (H) 05/14/2024    BUN 40 (H) 05/14/2024    CREATININE 2.00 (H) 05/14/2024     Lab Results   Component Value Date    WBC 3.6 (L) 05/14/2024    HGB 9.8 (L) 05/14/2024    HCT 31.2 (L) 05/14/2024    MCV 95 05/14/2024    PLT 67 (L) 05/14/2024

## 2024-05-14 NOTE — CONSULTS
Consults    Reason For Consult  Acute kidney injury    History Of Present Illness  Sara Santiago is a 73 y.o. female with a past medical history of renal cell carcinoma status post partial right nephrectomy, also with a history of breast cancer however not chemotherapy, chronic kidney disease stage III who follows with Dr. Smith as an outpatient  who presents to the hospital with a clogged feeding tube.  Notably the patient recently had a fall, L2 fracture, status post surgery in March, then required feeding tube afterwards and the patient is encephalopathic unable to provide any history but her daughter was in the room by the bedside helping to provide history.  The patient has had poor p.o. intake.  Her creatinine is 2.0.  Baseline creatinine 1.3-1.4.  We are consulted for management of acute kidney injury on chronic kidney disease stage III.     Past Medical History  She has a past medical history of Abnormal findings on diagnostic imaging of other abdominal regions, including retroperitoneum, Abnormal findings on diagnostic imaging of other specified body structures, Candidiasis of skin and nail (09/03/2019), Cellulitis of left lower limb (08/16/2021), Cellulitis of left toe (06/30/2021), Cellulitis of right lower limb (03/08/2021), Central retinal vein occlusion, left eye, stable (CMS-HCC) (03/21/2018), Cramp and spasm (08/16/2021), Encounter for immunization (03/21/2018), Encounter for immunization (03/21/2018), Other conditions influencing health status (03/21/2018), Other injury of unspecified body region, initial encounter (12/15/2017), Other injury of unspecified body region, initial encounter (01/10/2019), Other visual disturbances (03/21/2018), Personal history of diseases of the skin and subcutaneous tissue (11/24/2020), Personal history of other diseases of the respiratory system (07/05/2019), Personal history of other medical treatment, Personal history of other specified conditions (06/03/2020),  Personal history of other specified conditions (07/07/2020), Personal history of other specified conditions (09/06/2019), Personal history of other specified conditions (07/22/2020), Unspecified fall, initial encounter (06/04/2018), and Vitamin D deficiency, unspecified (06/04/2018).    Surgical History  She has a past surgical history that includes Other surgical history (05/10/2022); Other surgical history (01/27/2022); and CT angio neck (10/24/2023).     Social History  She reports that she has quit smoking. Her smoking use included cigarettes. She has never used smokeless tobacco. She reports that she does not currently use drugs after having used the following drugs: Marijuana. She reports that she does not drink alcohol.    Family History  Family History   Problem Relation Name Age of Onset    Diabetes Other multiple Family Members         Allergies  Other and Sulfa (sulfonamide antibiotics)    Review of Systems  Unable to obtain due to altered mental status     Physical Exam  Constitutional:       General: She is awake. She is not in acute distress.     Appearance: She is not toxic-appearing.   HENT:      Head: Normocephalic.      Mouth/Throat:      Mouth: Mucous membranes are dry.   Eyes:      General: No scleral icterus.     Comments: Conjunctiva clear   Neck:      Vascular: No JVD.      Comments: supple  Cardiovascular:      Rate and Rhythm: Regular rhythm.      Heart sounds:      No friction rub.   Pulmonary:      Effort: Pulmonary effort is normal.      Comments: Mildly coarse breath sounds  Abdominal:      General: Bowel sounds are normal.      Palpations: Abdomen is soft.      Tenderness: There is no guarding or rebound.   Musculoskeletal:      Cervical back: Neck supple.      Right lower leg: No edema.      Left lower leg: No edema.   Skin:     General: Skin is warm and dry.   Neurological:      Mental Status: She is alert. She is confused.      Comments: Cooperative with exam            Last Recorded  "Vitals  Blood pressure 139/53, pulse 82, temperature 36.7 °C (98.1 °F), temperature source Oral, resp. rate 18, height 1.575 m (5' 2\"), weight 68.8 kg (151 lb 10.8 oz), SpO2 99%.    Relevant Results  Results for orders placed or performed during the hospital encounter of 05/12/24 (from the past 24 hour(s))   POCT GLUCOSE   Result Value Ref Range    POCT Glucose 165 (H) 74 - 99 mg/dL   POCT GLUCOSE   Result Value Ref Range    POCT Glucose 174 (H) 74 - 99 mg/dL   POCT GLUCOSE   Result Value Ref Range    POCT Glucose 140 (H) 74 - 99 mg/dL   CBC   Result Value Ref Range    WBC 3.6 (L) 4.4 - 11.3 x10*3/uL    nRBC 0.0 0.0 - 0.0 /100 WBCs    RBC 3.29 (L) 4.00 - 5.20 x10*6/uL    Hemoglobin 9.8 (L) 12.0 - 16.0 g/dL    Hematocrit 31.2 (L) 36.0 - 46.0 %    MCV 95 80 - 100 fL    MCH 29.8 26.0 - 34.0 pg    MCHC 31.4 (L) 32.0 - 36.0 g/dL    RDW 16.3 (H) 11.5 - 14.5 %    Platelets 67 (L) 150 - 450 x10*3/uL   Comprehensive Metabolic Panel   Result Value Ref Range    Glucose 175 (H) 65 - 99 mg/dL    Sodium 139 133 - 145 mmol/L    Potassium 3.4 3.4 - 5.1 mmol/L    Chloride 110 (H) 97 - 107 mmol/L    Bicarbonate 16 (L) 24 - 31 mmol/L    Urea Nitrogen 40 (H) 8 - 25 mg/dL    Creatinine 2.00 (H) 0.40 - 1.60 mg/dL    eGFR 26 (L) >60 mL/min/1.73m*2    Calcium 8.7 8.5 - 10.4 mg/dL    Albumin 2.2 (L) 3.5 - 5.0 g/dL    Alkaline Phosphatase 136 (H) 35 - 125 U/L    Total Protein 7.3 5.9 - 7.9 g/dL    AST 20 5 - 40 U/L    Bilirubin, Total 0.3 0.1 - 1.2 mg/dL    ALT 10 5 - 40 U/L    Anion Gap 13 <=19 mmol/L   POCT GLUCOSE   Result Value Ref Range    POCT Glucose 181 (H) 74 - 99 mg/dL   Blood Gas Arterial Full Panel   Result Value Ref Range    POCT pH, Arterial 7.32 (L) 7.38 - 7.42 pH    POCT pCO2, Arterial 30 (L) 38 - 42 mm Hg    POCT pO2, Arterial 91 85 - 95 mm Hg    POCT SO2, Arterial 100 94 - 100 %    POCT Oxy Hemoglobin, Arterial 93.9 (L) 94.0 - 98.0 %    POCT Hematocrit Calculated, Arterial 31.0 (L) 36.0 - 46.0 %    POCT Sodium, Arterial 134 " (L) 136 - 145 mmol/L    POCT Potassium, Arterial 3.4 (L) 3.5 - 5.3 mmol/L    POCT Chloride, Arterial 111 (H) 98 - 107 mmol/L    POCT Ionized Calcium, Arterial 1.31 1.10 - 1.33 mmol/L    POCT Glucose, Arterial 322 (H) 74 - 99 mg/dL    POCT Lactate, Arterial 1.1 0.4 - 2.0 mmol/L    POCT Base Excess, Arterial -9.5 (L) -2.0 - 3.0 mmol/L    POCT HCO3 Calculated, Arterial 15.5 (L) 22.0 - 26.0 mmol/L    POCT Hemoglobin, Arterial 10.2 (L) 12.0 - 16.0 g/dL    POCT Anion Gap, Arterial 11 10 - 25 mmo/L    Patient Temperature 37.0 degrees Celsius    FiO2 21 %    Site of Arterial Puncture Radial Right     Eusebio's Test Positive           Assessment/Plan   1.  Acute kidney injury secondary to volume depletion from poor p.o. intake  2. Chronic kidney disease stage III  - baseline Cr 1.3-1.4  3. History of renal cell carcinoma status post partial nephrectomy  4. Pancytopenia  5. Diabetes mellitus    Plan: Continue IV fluids however switch to D5 half-normal saline with 75 mEq of sodium bicarb and 20 mEq of potassium.  Check a renal ultrasound.  Monitor urine culture results.  Renally dose medications for GFR.  Check an ABG.  Recommend hematology consultation for pancytopenia.  Thank you for your consultation.    Shruti Dobbs MD

## 2024-05-14 NOTE — PROGRESS NOTES
Speech-Language Pathology                 Therapy Communication Note    Patient Name: Sara Santiago  MRN: [id number]  Today's Date: 5/14/2024     Discipline: Speech Language Pathology    Missed Visit Reason: Pt asleep and in soft wrist restraints; session deferred at this time to avoid additional agitation from being awakened.    Missed Time: Attempt    Comment: SLP attempted to treat pt at bedside since NG tube removed to determine safety with solid foods s/p refusal of MBSS this morning.  Pt asleep and had been agitated earlier.  Will attempt MBSS or treatment at bedside on 5/15 as pt status and schedule allows.

## 2024-05-14 NOTE — CONSULTS
Wound Care Consult     Visit Date: 5/14/2024      Patient Name: Sara Santiago         MRN: [id number]           YOB: 1950     Reason for Consult: Sarcral, Thoracic, and Bilateral inner thigh wounds        Wound History: Present on admission. Patient from [facility]. Patient has 3 Stage 3/ MASD pressure injuries to Sacrum, Dehisced/MASD to Thora/Lumbar incision, Full Thickness wounds to bilateral inner thighs from briefs.        A 73 y.o. year old female admitted for Principal Problem:    Clogged feeding tube      Past Medical History:   Diagnosis Date    Abnormal findings on diagnostic imaging of other abdominal regions, including retroperitoneum     Abnormal CT of the abdomen    Abnormal findings on diagnostic imaging of other specified body structures     Abnormal computed tomography angiography (CTA)    Candidiasis of skin and nail 09/03/2019    Candidal intertrigo    Cellulitis of left lower limb 08/16/2021    Cellulitis of left lower leg    Cellulitis of left toe 06/30/2021    Acute paronychia of toe of left foot    Cellulitis of right lower limb 03/08/2021    Cellulitis of right lower extremity    Central retinal vein occlusion, left eye, stable (CMS-HCC) 03/21/2018    Central retinal vein occlusion of left eye    Cramp and spasm 08/16/2021    Cramps, extremity    Encounter for immunization 03/21/2018    Immunization due    Encounter for immunization 03/21/2018    Need for pneumococcal vaccination    Other conditions influencing health status 03/21/2018    Uncontrolled diabetes mellitus    Other injury of unspecified body region, initial encounter 12/15/2017    Bruising    Other injury of unspecified body region, initial encounter 01/10/2019    Surgical wound present    Other visual disturbances 03/21/2018    Blurry vision    Personal history of diseases of the skin and subcutaneous tissue 11/24/2020    History of skin pruritus    Personal history of other diseases of the respiratory system 07/05/2019     History of sore throat    Personal history of other medical treatment     H/O Doppler ultrasound    Personal history of other specified conditions 06/03/2020    History of headache    Personal history of other specified conditions 07/07/2020    History of flank pain    Personal history of other specified conditions 09/06/2019    History of fatigue    Personal history of other specified conditions 07/22/2020    History of wheezing    Unspecified fall, initial encounter 06/04/2018    Fall    Vitamin D deficiency, unspecified 06/04/2018    Mild vitamin D deficiency      Past Surgical History:   Procedure Laterality Date    CT ANGIO NECK  10/24/2023    CT NECK ANGIO W AND WO IV CONTRAST 10/24/2023    OTHER SURGICAL HISTORY  05/10/2022    Lumpectomy    OTHER SURGICAL HISTORY  01/27/2022    Nephrectomy partial       Scheduled medications  [START ON 5/15/2024] cefepime, 2 g, intravenous, q24h  heparin (porcine), 5,000 Units, subcutaneous, q8h  HYDROmorphone, 1 mg, intravenous, q6h  insulin lispro, 0-10 Units, subcutaneous, TID with meals      Continuous medications  dextrose 5%-0.45 % sodium chloride 1,000 mL with potassium chloride 20 mEq, sodium bicarbonate 75 mEq infusion, 80 mL/hr      PRN medications  PRN medications: dextrose, dextrose, glucagon, glucagon, ipratropium-albuteroL, morphine, ondansetron    Allergies   Allergen Reactions    Other Other and Unknown     Sulfa containing compounds    Sulfa (Sulfonamide Antibiotics) Unknown     Pertinent Labs:   Albumin   Date Value Ref Range Status   05/14/2024 2.2 (L) 3.5 - 5.0 g/dL Final       Wound Assessment:  Wound 05/13/24 Pressure Injury Sacrum (Active)   Wound Image   05/13/24 2102   Site Assessment Red;Painful 05/14/24 1000   Rosi-Wound Assessment Non-blanchable erythema;Painful 05/14/24 1000   Pressure Injury Stage 3 05/14/24 1000   Margins Well-defined edges 05/14/24 1000   Dressing Foam;Silicone border dressing 05/14/24 1000   Dressing Status Clean;Dry  05/14/24 1000       Wound 05/13/24 Incision Back Medial;Upper (Active)   Wound Image   05/13/24 2102   Site Assessment White;Yellow;Sloughing;Red;Painful;Dehisced 05/14/24 1000   Rosi-Wound Assessment Red 05/14/24 1000   Non-staged Wound Description Full thickness 05/14/24 1000   Margins Poorly defined 05/14/24 1000       Wound 05/13/24 Traumatic Leg Left;Upper (Active)   Wound Image   05/13/24 2109   Site Assessment Painful;Red;Yellow 05/14/24 1000   Rosi-Wound Assessment Red 05/14/24 1000   Non-staged Wound Description Full thickness 05/14/24 1000   Dressing Foam;Silicone border dressing 05/14/24 1000   Dressing Status Clean;Dry 05/14/24 1000       Wound 05/13/24 Traumatic Leg Right;Upper (Active)   Wound Image   05/13/24 2109   Site Assessment Yellow;Red;Painful 05/14/24 1000   Rosi-Wound Assessment Red 05/14/24 1000   Non-staged Wound Description Full thickness 05/14/24 1000   Margins Well-defined edges 05/14/24 1000   Dressing Foam;Silicone border dressing 05/14/24 1000   Dressing Status Clean;Dry 05/14/24 1000       Wound 05/13/24 Traumatic Elbow Dorsal;Right (Active)   Wound Image   05/13/24 2110   Site Assessment Dry 05/14/24 1000   Rosi-Wound Assessment Clean;Dry;Intact 05/14/24 1000   Drainage Description None 05/14/24 1000   Drainage Amount None 05/14/24 1000   Dressing Foam;Silicone border dressing 05/14/24 1000   Dressing Status Clean;Dry 05/14/24 1000       Wound Team Summary Assessment: Exam conducted on day 2 of stay with knowledge of Floor Nurse. Introductions made to patient and daughter, alert and oriented. On exam patient lying semifowlers in bed with legs elevated offloaded on left side with pillow. Patient complains of pain to buttock and back. Photos reviewed. Patient has 3 Stage 3 pressure injuries with MASD to Sacrum, moisture associated dehisce to Thor/Lumbar incision and Full thickness breakdown to bilateral inner thighs from breifs at facility. Patient does have a bacno catheter. Request  sent to Provider for General Surgery and Infectious Disease consult. Woundcare orders placed. Border dressings in place, heels clean, dry, intact. Patient is on an Advanta 2 bedframe with Accumax mattress with Waffle mattress overlay. Order placed for Envision [id number] low airloss mattress,  notified. Neida Vick RN updated, to continue pressure injury prevention interventions, Woundcare, and nursing to continue to follow providers orders. Reconsult Wound RN PRN. hSira Swanson BSN RN       Wound Team Plan: Continue to follow Provider orders. Sacrum- cleanse with soap and water, apply Medihoney to woundbed, Triad to periarea, cover with gauze and border dressing daily and prn. Continue to offload.  Johan inner thighs- cleanse with soap and water, apply Medihoney, gauze and DCD daily and prn. Thoracic- cleanse with soap and water, apply Melgisorb AG to woundbeds, Triad to periwound, cover with DCD daily and prn. Continue to offload.      Shira Swanson RN  5/14/2024  10:22 AM

## 2024-05-14 NOTE — PROGRESS NOTES
Sara Santiago is a 73 y.o. female on day 2 of admission presenting with Clogged feeding tube.      Subjective   HPI: This is a 73 y.o. female who was sent to the ER for a clogged NG tube.       72 y/o woman pmhx sig for T2DM last A1c 6.7, COPD, hx renal Ca s/p partial nephrectomy, hx breast Ca s/p b/l implants, recent metro admission 3/14-4/1/24 to [facility], [place], operated by [facility] after fall from chair w/ L2 b/l pedicle fracture and underwent T11-L4 reduction and fusion 3/22. Discharged to SNF 4/1,      Patient has had the NG tube the last few weeks as her swallowing was very poor following the surgery to her back.  In the last week patient has been eating better per daughter who was at bedside.  Speech therapy had seen the patient discussed with the speech therapist will discontinue the NG tube and wait for a MBS to be done tomorrow and determine the need for the NGT after that.  Patient is a very poor historian unable to offer any history     ED HPI : 73-year-old female with complicated past medical history including dysphagia requiring NG tube feedings, type 2 diabetes, COPD, CKD, hypothyroidism, depression, kidney cancer status post resection and advanced directive of DNR CC is brought to the emergency department from her nursing home at [facility] with a chief complaint of clogged feeding tube. The patient has a Dobbhoff feeding tube that has not been flushing or returning of fluid for a day. The patient is confused and does not verbalize if she has any pain or discomfort.    Objective     Last Recorded Vitals  /58 (BP Location: Left arm, Patient Position: Lying)   Pulse 84   Temp 36.6 °C (97.9 °F) (Oral)   Resp 18   Wt 68.8 kg (151 lb 10.8 oz)   SpO2 98%   Intake/Output last 3 Shifts:    Intake/Output Summary (Last 24 hours) at 5/16/2024 2008  Last data filed at 5/16/2024 1539  Gross per 24 hour   Intake 1881.99 ml   Output 2000 ml   Net -118.01 ml       Admission Weight  Weight: 83.5 kg (184 lb) (05/12/24  2235)    Daily Weight  05/13/24 : 68.8 kg (151 lb 10.8 oz)    Image Results  US renal complete  Narrative: Interpreted By:  Jamey Knox,   STUDY:  US RENAL COMPLETE; 5/15/2024 1:22 pm      INDICATION:  Signs/Symptoms:THAD.      COMPARISON:  None.      ACCESSION NUMBER(S):  [id number]      ORDERING CLINICIAN:  GAEL MCDONALD      TECHNIQUE:  Grayscale and color Doppler imaging of the kidneys.      FINDINGS:  The right kidney measures 8.1 x 5.0 cm no hydronephrosis or  nephrolithiasis demonstrated. Normal cortical thickness The left  kidney measures 10.1 x 4.3 cm. There is normal cortical thickness. No  hydronephrosis or nephrolithiasis.              .      Impression: 1. No hydronephrosis demonstrated      MACRO:  None.      Signed by: Jamey Knox 5/15/2024 2:00 PM  Dictation workstation:   [id number]      Physical Exam  GENERAL: awake,  Ox2, confused   SKIN: Skin turgor normal. No rashes  HEENT: no epistaxis, Moist mucosa.  NECK: supple  BACK: spine nontender to palpation, No CVAT.  LUNGS: Vesicular breath sounds, with no wheeze, no crepitations.   CARDIAC: REGULAR. S1 and S2; no rubs, no murmur  ABDOMEN: Abdomen soft, non-tender. BS+  EXTREMITIES: No edema, Good capillary refill.   NEURO: Insight GOOD. Motor and sensory exam wnl. No invol movements. No ataxia.  WOUND: Per wound care  MUSCULOSKELETAL: No acute inflammation    Relevant Results  Results for orders placed or performed during the hospital encounter of 05/12/24 (from the past 24 hour(s))   POCT GLUCOSE   Result Value Ref Range    POCT Glucose 109 (H) 74 - 99 mg/dL   CBC   Result Value Ref Range    WBC 3.9 (L) 4.4 - 11.3 x10*3/uL    nRBC 0.0 0.0 - 0.0 /100 WBCs    RBC 3.85 (L) 4.00 - 5.20 x10*6/uL    Hemoglobin 11.4 (L) 12.0 - 16.0 g/dL    Hematocrit 37.4 36.0 - 46.0 %    MCV 97 80 - 100 fL    MCH 29.6 26.0 - 34.0 pg    MCHC 30.5 (L) 32.0 - 36.0 g/dL    RDW 17.1 (H) 11.5 - 14.5 %    Platelets 79 (L) 150 - 450 x10*3/uL   Comprehensive Metabolic  Panel   Result Value Ref Range    Glucose 171 (H) 65 - 99 mg/dL    Sodium 141 133 - 145 mmol/L    Potassium 3.4 3.4 - 5.1 mmol/L    Chloride 112 (H) 97 - 107 mmol/L    Bicarbonate 19 (L) 24 - 31 mmol/L    Urea Nitrogen 38 (H) 8 - 25 mg/dL    Creatinine 2.20 (H) 0.40 - 1.60 mg/dL    eGFR 23 (L) >60 mL/min/1.73m*2    Calcium 8.8 8.5 - 10.4 mg/dL    Albumin 2.3 (L) 3.5 - 5.0 g/dL    Alkaline Phosphatase 150 (H) 35 - 125 U/L    Total Protein 7.6 5.9 - 7.9 g/dL    AST 22 5 - 40 U/L    Bilirubin, Total 0.3 0.1 - 1.2 mg/dL    ALT 9 5 - 40 U/L    Anion Gap 10 <=19 mmol/L   POCT GLUCOSE   Result Value Ref Range    POCT Glucose 176 (H) 74 - 99 mg/dL   POCT GLUCOSE   Result Value Ref Range    POCT Glucose 242 (H) 74 - 99 mg/dL   POCT GLUCOSE   Result Value Ref Range    POCT Glucose 196 (H) 74 - 99 mg/dL     Scheduled medications  cefepime, 2 g, intravenous, q24h  daptomycin, 500 mg, intravenous, q48h  fluticasone furoate-vilanteroL, 1 puff, inhalation, Daily  gabapentin, 300 mg, oral, BID  heparin (porcine), 5,000 Units, subcutaneous, q8h  insulin lispro, 0-10 Units, subcutaneous, TID  latanoprost, 1 drop, Both Eyes, Nightly  loratadine, 10 mg, oral, Daily  methadone, 40 mg, oral, q12h  methocarbamol, 750 mg, oral, q6h during day  mirtazapine, 7.5 mg, oral, Nightly  sennosides, 1 tablet, oral, Daily      Continuous medications  dextrose 5%-0.45 % sodium chloride 1,000 mL with potassium chloride 20 mEq, sodium bicarbonate 75 mEq infusion, 80 mL/hr, Last Rate: 80 mL/hr (05/16/24 0227)      PRN medications  PRN medications: dextrose, dextrose, glucagon, glucagon, ipratropium-albuteroL, morphine, ondansetron, simethicone            Assessment/Plan        # Dysphagia  -MBS tomorrow  -May need NG to be placed     # 1. S/p enterobacter surgical site infection 4/12/2024  _L2 pedicle fracture, s/p T11-L4 fusion  _ Lumbar osteomyelitis   -On IV antibiotics will continue the same     #DM2 / neuropathy  -Insulin sliding scale  -Patient is  n.p.o. and hence cannot get the gabapentin     # Opoid use disorder on methadone   -Patient has chronic pain also  -Has been on methadone for several years     # Hypertension  -IV hydralazine as needed     # CKD/THAD  -IV fluids       5/14-wound care input noted, patient has lumbar spine incision dehiscence, seen by surgery, recommend transfer to [facility], [place], operated by [facility] where the surgery was initially done.  Called daughter to obtain consent, unable to reach her and left a message will try again later.  Patient has been eating well, she has not been cooperative for MBS assessment by speech, but per RN and staff patient has been safe to eat.  May not need NG tube replaced, will plan to transfer to [facility] [place], operated by [facility] when I obtain consent for the same from the daughter.    5/15-discussed with [facility] [place] operated by [facility] physician yesterday, patient has been accepted for transfer to [facility] [place], operated by [facility], patient is able to eat well, continue with IV antibiotics as planned, transfer to [facility] [place], operated by [facility] when bed available.    5/16: Pt still confused, but appears to be at baseline, Lumbar osteomyelitis, Pt eating better, d/w Dtr on the phone, THAD& CKD, DM type 2 stable. Pending transfer to [facility].    Simon Delgado MD

## 2024-05-14 NOTE — CARE PLAN
The patient's goals for the shift include      The clinical goals for the shift include get some rest    Problem: Skin  Goal: Decreased wound size/increased tissue granulation at next dressing change  Recent Flowsheet Documentation  Taken 5/14/2024 0034 by Ana Lopez RN  Decreased wound size/increased tissue granulation at next dressing change: Promote sleep for wound healing     Problem: Skin  Goal: Decreased wound size/increased tissue granulation at next dressing change  Outcome: Progressing  Flowsheets (Taken 5/14/2024 0034)  Decreased wound size/increased tissue granulation at next dressing change: Promote sleep for wound healing

## 2024-05-14 NOTE — CARE PLAN
The patient's goals for the shift include      The clinical goals for the shift include safety      Problem: Pain  Goal: My pain/discomfort is manageable  Outcome: Progressing     Problem: Safety  Goal: Patient will be injury free during hospitalization  Outcome: Progressing  Goal: I will remain free of falls  Outcome: Progressing     Problem: Daily Care  Goal: Daily care needs are met  Outcome: Progressing     Problem: Psychosocial Needs  Goal: Demonstrates ability to cope with hospitalization/illness  Outcome: Progressing  Goal: Collaborate with me, my family, and caregiver to identify my specific goals  Outcome: Progressing     Problem: Discharge Barriers  Goal: My discharge needs are met  Outcome: Progressing     Problem: Pain  Goal: Takes deep breaths with improved pain control throughout the shift  Outcome: Progressing  Goal: Turns in bed with improved pain control throughout the shift  Outcome: Progressing  Goal: Walks with improved pain control throughout the shift  Outcome: Progressing  Goal: Performs ADL's with improved pain control throughout shift  Outcome: Progressing  Goal: Participates in PT with improved pain control throughout the shift  Outcome: Progressing  Goal: Free from opioid side effects throughout the shift  Outcome: Progressing  Goal: Free from acute confusion related to pain meds throughout the shift  Outcome: Progressing     Problem: Fall/Injury  Goal: Not fall by end of shift  Outcome: Progressing  Goal: Be free from injury by end of the shift  Outcome: Progressing  Goal: Verbalize understanding of personal risk factors for fall in the hospital  Outcome: Progressing  Goal: Verbalize understanding of risk factor reduction measures to prevent injury from fall in the home  Outcome: Progressing  Goal: Use assistive devices by end of the shift  Outcome: Progressing  Goal: Pace activities to prevent fatigue by end of the shift  Outcome: Progressing     Problem: Skin  Goal: Decreased wound  size/increased tissue granulation at next dressing change  Outcome: Progressing  Flowsheets (Taken 5/14/2024 1413)  Decreased wound size/increased tissue granulation at next dressing change:   Promote sleep for wound healing   Utilize specialty bed per algorithm   Protective dressings over bony prominences  Goal: Participates in plan/prevention/treatment measures  Outcome: Progressing  Flowsheets (Taken 5/14/2024 1413)  Participates in plan/prevention/treatment measures:   Discuss with provider PT/OT consult   Elevate heels  Goal: Prevent/manage excess moisture  Outcome: Progressing  Flowsheets (Taken 5/14/2024 1413)  Prevent/manage excess moisture:   Cleanse incontinence/protect with barrier cream   Follow provider orders for dressing changes  Goal: Prevent/minimize sheer/friction injuries  Outcome: Progressing  Flowsheets (Taken 5/14/2024 1413)  Prevent/minimize sheer/friction injuries:   Complete micro-shifts as needed if patient unable. Adjust patient position to relieve pressure points, not a full turn   Use pull sheet   Turn/reposition every 2 hours/use positioning/transfer devices  Goal: Promote/optimize nutrition  Outcome: Progressing  Flowsheets (Taken 5/14/2024 1413)  Promote/optimize nutrition:   Assist with feeding   Consume > 50% meals/supplements   Offer water/supplements/favorite foods   Monitor/record intake including meals  Goal: Promote skin healing  Outcome: Progressing  Flowsheets (Taken 5/14/2024 1413)  Promote skin healing:   Protective dressings over bony prominences   Turn/reposition every 2 hours/use positioning/transfer devices     Problem: Diabetes  Goal: I will have no complications due to diabetes  Outcome: Progressing

## 2024-05-14 NOTE — RESEARCH NOTES
Artificial Intelligence Monitoring in Nursing (AIMS Nursing) Study    Principle Investigator - Dr. Leonel Cherry  Research Coordinator - DANIA Warren     Patient Name - Sara Santiago  Date - 5/14/2024 3:48 PM  Location - Fredy Santiago was approached by DANIA Warren to talk about participating in the AIMS Nursing Study. The patient was approached, they are expecting to be discharged today. Study protocol was followed and patient was given study contact information.     DANIA Warren

## 2024-05-14 NOTE — PROGRESS NOTES
05/14/24 1827   Discharge Planning   Patient expects to be discharged to: transfer to [facility] [place] [place]     Per Dr. Delgado, surgery is recommending transfer to [facility] where original spine surgery was done. If patient does not transfer, plan will be to return to [facility] for skilled rehab. Patient is a bed hold. Currently has bilateral wrist restraints.

## 2024-05-14 NOTE — CONSULTS
Nutrition Assessement Note    Nutrition Assessment    Reason for Assessment: Provider consult order  Admitted from SNF with NG tube for nutrition that is clogged. Spoke with [facility]. Dietitian report patient receives Isosource 1.5 @50 mL/Hr for 6 hours daily. [facility] patient also takes food orally but po intake fluctuate. NG tube removed at this time. Patient has diet order of pureed (thick) with thin liquids. MBS rescheduled for tomorrow. Spoke with RN. States daughter was feeding patient this morning, unsure about intake. Will re-consult dietitian if NG tube is replaced and TF recs are requested. Will continue to follow.    Reason for Hospital Admission:  Sara Santiago is a 73 y.o. female who is admitted for clogged NG tube    Past Medical History:   Diagnosis Date    Abnormal findings on diagnostic imaging of other abdominal regions, including retroperitoneum     Abnormal CT of the abdomen    Abnormal findings on diagnostic imaging of other specified body structures     Abnormal computed tomography angiography (CTA)    Candidiasis of skin and nail 09/03/2019    Candidal intertrigo    Cellulitis of left lower limb 08/16/2021    Cellulitis of left lower leg    Cellulitis of left toe 06/30/2021    Acute paronychia of toe of left foot    Cellulitis of right lower limb 03/08/2021    Cellulitis of right lower extremity    Central retinal vein occlusion, left eye, stable (CMS-HCC) 03/21/2018    Central retinal vein occlusion of left eye    Cramp and spasm 08/16/2021    Cramps, extremity    Encounter for immunization 03/21/2018    Immunization due    Encounter for immunization 03/21/2018    Need for pneumococcal vaccination    Other conditions influencing health status 03/21/2018    Uncontrolled diabetes mellitus    Other injury of unspecified body region, initial encounter 12/15/2017    Bruising    Other injury of unspecified body region, initial encounter 01/10/2019    Surgical wound present    Other  "visual disturbances 03/21/2018    Blurry vision    Personal history of diseases of the skin and subcutaneous tissue 11/24/2020    History of skin pruritus    Personal history of other diseases of the respiratory system 07/05/2019    History of sore throat    Personal history of other medical treatment     H/O Doppler ultrasound    Personal history of other specified conditions 06/03/2020    History of headache    Personal history of other specified conditions 07/07/2020    History of flank pain    Personal history of other specified conditions 09/06/2019    History of fatigue    Personal history of other specified conditions 07/22/2020    History of wheezing    Unspecified fall, initial encounter 06/04/2018    Fall    Vitamin D deficiency, unspecified 06/04/2018    Mild vitamin D deficiency      Past Surgical History:   Procedure Laterality Date    CT ANGIO NECK  10/24/2023    CT NECK ANGIO W AND WO IV CONTRAST 10/24/2023    OTHER SURGICAL HISTORY  05/10/2022    Lumpectomy    OTHER SURGICAL HISTORY  01/27/2022    Nephrectomy partial       Nutrition History:  Food and Nutrient History: NA     Food Allergies/Intolerances:  None  GI Symptoms: None  Oral Problems: Swallowing difficulty    Anthropometrics:  Ht: 157.5 cm (5' 2\"), Wt: 68.8 kg (151 lb 10.8 oz), BMI: 27.73  IBW/kg (Dietitian Calculated): 50 kg  Percent of IBW: 137 %       Weight Change:  Daily Weight  05/13/24 : 68.8 kg (151 lb 10.8 oz)  02/29/24 : 83.5 kg (184 lb 1.6 oz)  02/23/24 : 90.7 kg (200 lb)  01/11/24 : 86.2 kg (190 lb)  11/30/23 : 92.5 kg (204 lb)  10/19/23 : 90 kg (198 lb 6.4 oz)  08/24/23 : 85.8 kg (189 lb 1.6 oz)  07/24/23 : 85.2 kg (187 lb 12.8 oz)  06/28/23 : 85.3 kg (188 lb)  05/16/23 : 83.5 kg (184 lb)     Weight History / % Weight Change: Records indicate 40-50# weight loss in past 5 months, bed scale not working-unable to verify recorded weight     Nutrition Focused Physical Exam Findings:      Nutrition Significant Labs:  Lab Results "   Component Value Date    WBC 3.6 (L) 05/14/2024    HGB 9.8 (L) 05/14/2024    HCT 31.2 (L) 05/14/2024    PLT 67 (L) 05/14/2024    CHOL 164 03/07/2023    TRIG 122 11/14/2017    HDL 38.6 (A) 03/07/2023    ALT 10 05/14/2024    AST 20 05/14/2024     05/14/2024    K 3.4 05/14/2024     (H) 05/14/2024    CREATININE 2.00 (H) 05/14/2024    BUN 40 (H) 05/14/2024    CO2 16 (L) 05/14/2024    TSH 3.17 05/12/2023    INR 1.1 10/24/2022    HGBA1C 6.2 (H) 04/30/2024     Nutrition Specific Medications:  [START ON 5/15/2024] cefepime, 2 g, intravenous, q24h  heparin (porcine), 5,000 Units, subcutaneous, q8h  insulin lispro, 0-10 Units, subcutaneous, TID  methadone, 40 mg, oral, q12h      dextrose 5%-0.45 % sodium chloride 1,000 mL with potassium chloride 20 mEq, sodium bicarbonate 75 mEq infusion, 80 mL/hr      Dietary Orders (From admission, onward)       Start     Ordered    05/13/24 1146  Adult diet Regular; Pureed 4; Thin 0  Diet effective now        Question Answer Comment   Diet type Regular    Texture Pureed 4    Fluid consistency Thin 0        05/13/24 1146                   Estimated Needs:   Estimated Energy Needs  Total Energy Estimated Needs (kCal):  (4855-1493)  Total Estimated Energy Need per Day (kCal/kg):  (30-35)  Method for Estimating Needs: Actual Wt    Estimated Protein Needs  Total Protein Estimated Needs (g):  ()  Total Protein Estimated Needs (g/kg):  (1.2-1.5)  Method for Estimating Needs: Actual Wt    Estimated Fluid Needs  Total Fluid Estimated Needs (mL): 2070 mL  Total Fluid Estimated Needs (mL/kg): 30 mL/kg  Method for Estimating Needs: Actual Wt      Nutrition Diagnosis   Nutrition Diagnosis:     Nutrition Diagnosis  Patient has Nutrition Diagnosis: Yes  Diagnosis Status (1): New  Nutrition Diagnosis 1: Inadequate energy intake  Related to (1): decreased ability to consume sufficient energy  As Evidenced by (1): NG tube clogged  Additional Nutrition Diagnosis: Diagnosis 2  Diagnosis  Status (2): New  Nutrition Diagnosis 2: Increased nutrient needs  Related to (2): increased demand for nutrients  As Evidenced by (2): multiple wounds/stage 3 pressure injury to sacrum     Nutrition Interventions/Recommendations   Nutrition Interventions and Recommendations:    Nutrition Prescription:  Individualized Nutrition Prescription Provided for : 1725 calories, 69 gm protein to be provided via diet and supplements    Nutrition Interventions:   Food and/or Nutrient Delivery Interventions  Interventions: Meals and snacks, Medical food supplement  Meals and Snacks: Texture-modified diet  Goal: provide per SLP recs  Medical Food Supplement: Commercial beverage  Goal: rajni BID to aid in wound healing    Education Documentation  No documentation found.         Nutrition Monitoring and Evaluation   Monitoring/Evaluation:   Food/Nutrient Related History Monitoring  Monitoring and Evaluation Plan: Energy intake  Energy Intake: Estimated energy intake  Criteria: pt to consume >/= 75% estimated needs    Nutrition Focused Physical Findings  Monitoring and Evaluation Plan: Skin  Skin: Impaired wound healing  Criteria: pt will show signs of improvement in skin integrity       Time Spent/Follow-up:   Follow Up  Time Spent (min): 30 minutes  Last Date of Nutrition Visit: 05/14/24  Nutrition Follow-Up Needed?: 3-5 days  Follow up Comment: 5/17/24

## 2024-05-14 NOTE — PROGRESS NOTES
Speech-Language Pathology                 Therapy Communication Note    Patient Name: Sara Santiago  MRN: [id number]  Today's Date: 5/14/2024     Discipline: Speech Language Pathology    Missed Visit Reason: Pt refusing MBSS per RN will attempt again tomorrow 5/15/24     Missed Time: Attempt    Comment:

## 2024-05-15 ENCOUNTER — APPOINTMENT (OUTPATIENT)
Dept: RADIOLOGY | Facility: HOSPITAL | Age: 74
End: 2024-05-15
Payer: COMMERCIAL

## 2024-05-15 LAB
ALBUMIN SERPL-MCNC: 2.4 G/DL (ref 3.5–5)
ALP BLD-CCNC: 150 U/L (ref 35–125)
ALT SERPL-CCNC: 10 U/L (ref 5–40)
ANION GAP SERPL CALC-SCNC: 14 MMOL/L
AST SERPL-CCNC: 22 U/L (ref 5–40)
BACTERIA UR CULT: ABNORMAL
BILIRUB SERPL-MCNC: 1.1 MG/DL (ref 0.1–1.2)
BUN SERPL-MCNC: 37 MG/DL (ref 8–25)
CALCIUM SERPL-MCNC: 9 MG/DL (ref 8.5–10.4)
CHLORIDE SERPL-SCNC: 109 MMOL/L (ref 97–107)
CO2 SERPL-SCNC: 18 MMOL/L (ref 24–31)
CREAT SERPL-MCNC: 2.1 MG/DL (ref 0.4–1.6)
EGFRCR SERPLBLD CKD-EPI 2021: 24 ML/MIN/1.73M*2
ERYTHROCYTE [DISTWIDTH] IN BLOOD BY AUTOMATED COUNT: 15.9 % (ref 11.5–14.5)
GLUCOSE BLD MANUAL STRIP-MCNC: 104 MG/DL (ref 74–99)
GLUCOSE BLD MANUAL STRIP-MCNC: 109 MG/DL (ref 74–99)
GLUCOSE BLD MANUAL STRIP-MCNC: 252 MG/DL (ref 74–99)
GLUCOSE SERPL-MCNC: 171 MG/DL (ref 65–99)
HCT VFR BLD AUTO: 30.6 % (ref 36–46)
HGB BLD-MCNC: 10.3 G/DL (ref 12–16)
MAGNESIUM SERPL-MCNC: 1.7 MG/DL (ref 1.6–3.1)
MCH RBC QN AUTO: 30 PG (ref 26–34)
MCHC RBC AUTO-ENTMCNC: 33.7 G/DL (ref 32–36)
MCV RBC AUTO: 89 FL (ref 80–100)
NRBC BLD-RTO: 0 /100 WBCS (ref 0–0)
PLATELET # BLD AUTO: 76 X10*3/UL (ref 150–450)
POTASSIUM SERPL-SCNC: 3.3 MMOL/L (ref 3.4–5.1)
PROT SERPL-MCNC: 7.9 G/DL (ref 5.9–7.9)
RBC # BLD AUTO: 3.43 X10*6/UL (ref 4–5.2)
SODIUM SERPL-SCNC: 141 MMOL/L (ref 133–145)
WBC # BLD AUTO: 4.1 X10*3/UL (ref 4.4–11.3)

## 2024-05-15 PROCEDURE — 76775 US EXAM ABDO BACK WALL LIM: CPT | Performed by: RADIOLOGY

## 2024-05-15 PROCEDURE — S0109 METHADONE ORAL 5MG: HCPCS | Performed by: INTERNAL MEDICINE

## 2024-05-15 PROCEDURE — 80053 COMPREHEN METABOLIC PANEL: CPT | Performed by: INTERNAL MEDICINE

## 2024-05-15 PROCEDURE — 92526 ORAL FUNCTION THERAPY: CPT | Mod: GN | Performed by: SPEECH-LANGUAGE PATHOLOGIST

## 2024-05-15 PROCEDURE — 2500000004 HC RX 250 GENERAL PHARMACY W/ HCPCS (ALT 636 FOR OP/ED): Performed by: INTERNAL MEDICINE

## 2024-05-15 PROCEDURE — 83735 ASSAY OF MAGNESIUM: CPT | Performed by: INTERNAL MEDICINE

## 2024-05-15 PROCEDURE — 85027 COMPLETE CBC AUTOMATED: CPT | Performed by: INTERNAL MEDICINE

## 2024-05-15 PROCEDURE — 2500000001 HC RX 250 WO HCPCS SELF ADMINISTERED DRUGS (ALT 637 FOR MEDICARE OP): Performed by: INTERNAL MEDICINE

## 2024-05-15 PROCEDURE — 1210000001 HC SEMI-PRIVATE ROOM DAILY

## 2024-05-15 PROCEDURE — 82947 ASSAY GLUCOSE BLOOD QUANT: CPT

## 2024-05-15 PROCEDURE — 2500000002 HC RX 250 W HCPCS SELF ADMINISTERED DRUGS (ALT 637 FOR MEDICARE OP, ALT 636 FOR OP/ED): Performed by: INTERNAL MEDICINE

## 2024-05-15 PROCEDURE — 84075 ASSAY ALKALINE PHOSPHATASE: CPT | Performed by: INTERNAL MEDICINE

## 2024-05-15 PROCEDURE — 76770 US EXAM ABDO BACK WALL COMP: CPT

## 2024-05-15 PROCEDURE — 99232 SBSQ HOSP IP/OBS MODERATE 35: CPT | Performed by: NURSE PRACTITIONER

## 2024-05-15 RX ORDER — DAPTOMYCIN IN SODIUM CHLORIDE 500 MG/50ML
500 INJECTION, SOLUTION INTRAVENOUS
Qty: 9000 ML | Refills: 0 | Status: DISCONTINUED | OUTPATIENT
Start: 2024-05-16 | End: 2024-05-16 | Stop reason: HOSPADM

## 2024-05-15 RX ORDER — GABAPENTIN 300 MG/1
300 CAPSULE ORAL 2 TIMES DAILY
Status: DISCONTINUED | OUTPATIENT
Start: 2024-05-15 | End: 2024-05-16 | Stop reason: HOSPADM

## 2024-05-15 RX ORDER — POTASSIUM CHLORIDE 1.5 G/1.58G
20 POWDER, FOR SOLUTION ORAL ONCE
Status: COMPLETED | OUTPATIENT
Start: 2024-05-15 | End: 2024-05-15

## 2024-05-15 RX ORDER — DAPTOMYCIN IN SODIUM CHLORIDE 500 MG/50ML
500 INJECTION, SOLUTION INTRAVENOUS
Qty: 9000 ML | Refills: 0 | Status: SHIPPED | OUTPATIENT
Start: 2024-05-16 | End: 2025-05-16

## 2024-05-15 RX ADMIN — SENNOSIDES 8.6 MG: 8.6 TABLET, FILM COATED ORAL at 10:14

## 2024-05-15 RX ADMIN — METHADONE HYDROCHLORIDE 40 MG: 10 TABLET ORAL at 12:59

## 2024-05-15 RX ADMIN — METHOCARBAMOL TABLETS 750 MG: 750 TABLET, COATED ORAL at 21:52

## 2024-05-15 RX ADMIN — GABAPENTIN 300 MG: 300 CAPSULE ORAL at 21:52

## 2024-05-15 RX ADMIN — CEFEPIME 2 G: 2 INJECTION, POWDER, FOR SOLUTION INTRAVENOUS at 09:19

## 2024-05-15 RX ADMIN — INSULIN LISPRO 6 UNITS: 100 INJECTION, SOLUTION INTRAVENOUS; SUBCUTANEOUS at 12:57

## 2024-05-15 RX ADMIN — METHADONE HYDROCHLORIDE 40 MG: 10 TABLET ORAL at 01:06

## 2024-05-15 RX ADMIN — LATANOPROST 1 DROP: 50 SOLUTION OPHTHALMIC at 21:52

## 2024-05-15 RX ADMIN — METHOCARBAMOL TABLETS 750 MG: 750 TABLET, COATED ORAL at 10:14

## 2024-05-15 RX ADMIN — LORATADINE 10 MG: 10 TABLET ORAL at 10:14

## 2024-05-15 RX ADMIN — MIRTAZAPINE 7.5 MG: 15 TABLET, FILM COATED ORAL at 21:52

## 2024-05-15 RX ADMIN — POTASSIUM CHLORIDE 20 MEQ: 1.5 FOR SOLUTION ORAL at 11:46

## 2024-05-15 RX ADMIN — SODIUM BICARBONATE 80 ML/HR: 84 INJECTION, SOLUTION INTRAVENOUS at 05:52

## 2024-05-15 RX ADMIN — HEPARIN SODIUM 5000 UNITS: 5000 INJECTION, SOLUTION INTRAVENOUS; SUBCUTANEOUS at 06:16

## 2024-05-15 RX ADMIN — GABAPENTIN 400 MG: 400 CAPSULE ORAL at 10:14

## 2024-05-15 ASSESSMENT — COGNITIVE AND FUNCTIONAL STATUS - GENERAL
TOILETING: TOTAL
STANDING UP FROM CHAIR USING ARMS: TOTAL
DRESSING REGULAR UPPER BODY CLOTHING: TOTAL
TURNING FROM BACK TO SIDE WHILE IN FLAT BAD: TOTAL
HELP NEEDED FOR BATHING: TOTAL
WALKING IN HOSPITAL ROOM: TOTAL
CLIMB 3 TO 5 STEPS WITH RAILING: TOTAL
DRESSING REGULAR LOWER BODY CLOTHING: TOTAL
EATING MEALS: TOTAL
MOVING TO AND FROM BED TO CHAIR: TOTAL
MOVING FROM LYING ON BACK TO SITTING ON SIDE OF FLAT BED WITH BEDRAILS: TOTAL
DAILY ACTIVITIY SCORE: 6
PERSONAL GROOMING: TOTAL
MOBILITY SCORE: 6

## 2024-05-15 ASSESSMENT — PAIN - FUNCTIONAL ASSESSMENT
PAIN_FUNCTIONAL_ASSESSMENT: 0-10
PAIN_FUNCTIONAL_ASSESSMENT: WONG-BAKER FACES
PAIN_FUNCTIONAL_ASSESSMENT: 0-10

## 2024-05-15 ASSESSMENT — PAIN SCALES - GENERAL
PAINLEVEL_OUTOF10: 0 - NO PAIN

## 2024-05-15 ASSESSMENT — PAIN SCALES - WONG BAKER: WONGBAKER_NUMERICALRESPONSE: NO HURT

## 2024-05-15 NOTE — PROGRESS NOTES
"Sara Santiago is a 73 y.o. female on day 1 of admission presenting with Clogged feeding tube.    Subjective   Per RN update.  Patient is tolerating diet.  She is in restraints, needs assistance with feeds.  Nausea vomiting.  No abdominal pain. + bowel function.       Objective     Physical Exam  Constitutional:       Appearance: Normal appearance.   HENT:      Head: Normocephalic and atraumatic.      Right Ear: Tympanic membrane normal.      Nose: Nose normal.      Mouth/Throat:      Mouth: Mucous membranes are moist.   Eyes:      Pupils: Pupils are equal, round, and reactive to light.   Cardiovascular:      Rate and Rhythm: Normal rate and regular rhythm.      Pulses: Normal pulses.   Pulmonary:      Effort: Pulmonary effort is normal.      Breath sounds: Normal breath sounds.   Abdominal:      General: Bowel sounds are normal. There is no distension.      Palpations: Abdomen is soft.      Tenderness: There is no abdominal tenderness. There is no guarding.      Hernia: No hernia is present.   Genitourinary:     Rectum: Normal.   Musculoskeletal:         General: Normal range of motion.   Skin:     General: Skin is warm and dry.      Comments:   Back wound dressing intact.   Neurological:      Mental Status: She is alert. She is disoriented.   Psychiatric:         Behavior: Behavior normal.         Last Recorded Vitals  Blood pressure 136/56, pulse 72, temperature 36.5 °C (97.7 °F), temperature source Oral, resp. rate 18, height 1.575 m (5' 2\"), weight 68.8 kg (151 lb 10.8 oz), SpO2 97%.  Intake/Output last 3 Shifts:  I/O last 3 completed shifts:  In: 4286.7 (62.3 mL/kg) [P.O.:325; I.V.:3461.7 (50.3 mL/kg); IV Piggyback:500]  Out: 2025 (29.4 mL/kg) [Urine:2025 (0.8 mL/kg/hr)]  Weight: 68.8 kg     Relevant Results  Lab Results   Component Value Date    GLUCOSE 171 (H) 05/15/2024    CALCIUM 9.0 05/15/2024     05/15/2024    K 3.3 (L) 05/15/2024    CO2 18 (L) 05/15/2024     (H) 05/15/2024    BUN 37 (H) " 05/15/2024    CREATININE 2.10 (H) 05/15/2024     Lab Results   Component Value Date    WBC 4.1 (L) 05/15/2024    HGB 10.3 (L) 05/15/2024    HCT 30.6 (L) 05/15/2024    MCV 89 05/15/2024    PLT 76 (L) 05/15/2024                       Assessment/Plan   Principal Problem:    Clogged feeding tube  Active Problems:    Obstruction of feeding tube, initial encounter    5/15/2024 tolerating puréed diet.  Feeding assist. Dressings to spine are intact.  Recommend spine surgeon/orhtopedic consult vs transfer. No general surgery plans for spine pt, as bony structures could be involved.        I spent 15 minutes in the professional and overall care of this patient.      Marni Dumont, APRN-CNP

## 2024-05-15 NOTE — PROGRESS NOTES
05/15/24 0810   Discharge Planning   Patient expects to be discharged to: [facility]     If pt does not transfer, pt will need to be without restraints for 24 hours to return to P.    TCC following for safe dc plan.

## 2024-05-15 NOTE — CARE PLAN
The patient's goals for the shift include  rest    The clinical goals for the shift include safety

## 2024-05-15 NOTE — PROGRESS NOTES
Sara Santiago is a 73 y.o. female on day 1 of admission presenting with Clogged feeding tube.    Subjective   Remains     Objective   Range of Vitals (last 24 hours)  Heart Rate:  [72-80]   Temp:  [36.5 °C (97.7 °F)-37 °C (98.6 °F)]   Resp:  [18]   BP: (110-136)/(50-56)   SpO2:  [96 %-98 %]   Daily Weight  05/13/24 : 68.8 kg (151 lb 10.8 oz)    Body mass index is 27.74 kg/m².    Physical Exam  Constitutional:       Appearance: Normal appearance.   HENT:      Head: Normocephalic.      Nose: Nose normal.      Mouth/Throat:      Pharynx: Oropharynx is clear.   Eyes:      Conjunctiva/sclera: Conjunctivae normal.   Cardiovascular:      Rate and Rhythm: Normal rate.   Pulmonary:      Effort: Pulmonary effort is normal.      Breath sounds: Normal breath sounds.   Abdominal:      General: Bowel sounds are normal.      Palpations: Abdomen is soft.   Musculoskeletal:         General: Normal range of motion.      Cervical back: Normal range of motion.   Skin:     General: Skin is warm and dry.   Neurological:      Mental Status: She is alert. She is disoriented.           Antibiotics  sodium chloride 0.9% infusion  morphine injection 4 mg  ondansetron (Zofran) injection 4 mg  glucagon (Glucagen) injection 1 mg  dextrose 50 % injection 25 g  glucagon (Glucagen) injection 1 mg  dextrose 50 % injection 12.5 g  insulin lispro (HumaLOG) injection 0-10 Units  ipratropium-albuteroL (Duo-Neb) 0.5-2.5 mg/3 mL nebulizer solution 3 mL  hydrALAZINE (Apresoline) injection 10 mg  heparin (porcine) injection 5,000 Units  HYDROmorphone (Dilaudid) injection 1 mg  cefepime (Maxipime) IV 2 g  ipratropium-albuteroL (Duo-Neb) 0.5-2.5 mg/3 mL nebulizer solution 3 mL        cetirizine (ZyrTEC) tablet  docusate sodium (Colace) capsule    fluconazole (Diflucan) tablet    gabapentin (Neurontin) capsule        methocarbamol (Robaxin) tablet  mirtazapine (Remeron) tablet  ondansetron (Zofran-ODT) disintegrating tablet    simethicone (Mylicon) chewable  tablet  dextrose 5 % in water (D5W) infusion  cefepime (Maxipime) IV 2 g  dextrose 5 % and sodium chloride 0.45 % with KCl 20 mEq/L infusion  dextrose 5%-0.45 % sodium chloride 1,000 mL with potassium chloride 20 mEq, sodium bicarbonate 75 mEq infusion  methadone (Dolophine) tablet 40 mg  DAPTOmycin (Cubicin) 8 mg in sodium chloride 0.9% 50 mL IV  gabapentin (Neurontin) capsule 400 mg  latanoprost (Xalatan) 0.005 % ophthalmic solution 1 drop  methocarbamol (Robaxin) tablet 750 mg  mirtazapine (Remeron) tablet 7.5 mg  sennosides (Senokot) tablet 8.6 mg  simethicone (Mylicon) chewable tablet 80 mg  loratadine (Claritin) tablet 10 mg  fluticasone furoate-vilanteroL (Breo Ellipta) 100-25 mcg/dose inhaler 1 puff  DAPTOmycin (Cubicin) 500 mg/50 mL  mg  gabapentin (Neurontin) capsule 300 mg  potassium chloride (Klor-Con) packet 20 mEq      Relevant Results  Labs  Results from last 72 hours   Lab Units 05/15/24  0610 05/14/24  0502 05/13/24  0549 05/13/24  0041   WBC AUTO x10*3/uL 4.1* 3.6* 5.4 7.2   HEMOGLOBIN g/dL 10.3* 9.8* 10.4* 12.0   HEMATOCRIT % 30.6* 31.2* 34.4* 38.9   PLATELETS AUTO x10*3/uL 76* 67* 82* 102*   NEUTROS PCT AUTO %  --   --   --  70.5   LYMPHS PCT AUTO %  --   --   --  19.2   MONOS PCT AUTO %  --   --   --  7.2   EOS PCT AUTO %  --   --   --  2.2     Results from last 72 hours   Lab Units 05/15/24  0610 05/14/24  0502 05/13/24  0549   SODIUM mmol/L 141 139 136   POTASSIUM mmol/L 3.3* 3.4 4.0   CHLORIDE mmol/L 109* 110* 106   CO2 mmol/L 18* 16* 16*   BUN mg/dL 37* 40* 42*   CREATININE mg/dL 2.10* 2.00* 1.80*   GLUCOSE mg/dL 171* 175* 186*   CALCIUM mg/dL 9.0 8.7 9.2   ANION GAP mmol/L 14 13 14   EGFR mL/min/1.73m*2 24* 26* 29*     Results from last 72 hours   Lab Units 05/15/24  0610 05/14/24  0502 05/13/24  0549   ALK PHOS U/L 150* 136* 158*   BILIRUBIN TOTAL mg/dL 1.1 0.3 0.3   PROTEIN TOTAL g/dL 7.9 7.3 8.1*   ALT U/L 10 10 11   AST U/L 22 20 21   ALBUMIN g/dL 2.4* 2.2* 2.4*     Estimated  Creatinine Clearance: 21.7 mL/min (A) (by C-G formula based on SCr of 2.1 mg/dL (H)).  CRP   Date Value Ref Range Status   06/24/2022 0.97 mg/dL Final     Comment:     REF VALUE  < 1.00     03/14/2022 1.04 (A) mg/dL Final     Comment:     REF VALUE  < 1.00     06/02/2020 1.58 (A) mg/dL Final     Comment:     REF VALUE  < 1.00       Microbiology  Susceptibility data from last 14 days.  Collected Specimen Info Organism   05/13/24 Urine from Indwelling ([place]) Catheter Enterococcus faecium       Imaging  Reviewed        Assessment/Plan   E. Faecium Urinary tract infection-susceptibility pending  Recent thoracolumbar post op wound infection  -culture grew pseudomonas, Citrobacter, Proteus Mirabilis, Enterococcus faecalis-IV cefepime till 5/28/2024.  Acute kidney injury on chronic kidney disease stage III  Opioid use disorder on methadone        IV cefepime  IV daptomycin-cover E faecium-susceptibility pending  Monitor temperature and WBC  Follow up pending cultures   Monitor renal function  Nephrology follow-up  General surgery following  Local care  Supportive care   Further recommendation based on work up      Stephany Villareal, APRN-CNP

## 2024-05-15 NOTE — PROGRESS NOTES
Speech-Language Pathology    Speech-Language Pathology Clinical Swallow Treatment    Patient Name: Sara Santiago  MRN: [id number]  : 1950  Today's Date: 05/15/24  Start time: Start Time: 950  Stop time: Stop Time:   Time calculation (min) : Time Calculation (min): 21 min    ASSESSMENT  SLP TX Intervention Outcome: Making Progress Towards Goals  Treatment Tolerance: Other (Comment)    Impressions: NGT has been removed, pt refusing MBS, pt confused, slightly agitated, in bilateral restraints and does not appear to comprehend this procedure and reason for exam, when SLP attempts to explain. accepting minimal PO. Tolerated trials thin, puree and soft solids safely without s/s aspiration. Will continue to monitor if pt appropriate for MBSS and if it is indicated    PLAN  Recommended solid consistency: Soft/bite-sized (IDDSI level 6)  Recommended liquid consistency: Thin (IDDSI 0)  Recommended medication administration: Crushed, in puree  Safe swallow strategies: Upright positioning for all PO intake, Slow rate of intake, Small bites, Small sips, Alternate bites and sips, and Total assist for feeding  Discharge recommendation: Unable to determine at this time; please see follow-up notes for DC recommendation.  Inpatient/Swing Bed or Outpatient: Inpatient  SLP TX Plan: Continue Plan of Care  SLP Plan: Skilled SLP  SLP Frequency: 3x per week     Next Treatment Priority:     SUBJECTIVE  Prior to Session Communication: Bedside nurse  RN cleared pt to participate in session and reported pt in restraints d/t confusion and agitation  Respiratory status: Room air  Positioning: Upright in bed  Pt was alert and inattentive for session.    Pain Assessment  Pain Assessment: 0-10  Pain Score: 0 - No pain  Ang-Baker FACES Pain Rating: Hurts even more  Pain Type: Chronic pain  Pain Location: Back  Pain Interventions: Medication (See MAR)       Orientation: Oriented to self  Ability to follow functional commands: Follows  simple commands    OBJECTIVE  Therapeutic Swallow  Therapeutic Swallow Intervention : PO Trials, Compensatory Strategies, Caregiver Education, Pharyngeal Strengthening Techniques    Dysphagia Goals: (Established 5/13/24, projected discontinuation 2 weeks)     Pt will safely swallow recommended diet without s/s aspiration for 90% of observed trials in order to maintain adequate nutrition and hydration.   Status: Progressing   Progress this date: pt tolerating puree and thin liquids without s/s aspiration, needed max encouragement for minimal trials, pt stating she felt too full    Pt will safely swallow advanced consistency solids without s/s oral dysphagia or aspiration for 90% of trials.   Status: Progressing   Progress this date: Pt accepted 2 small pieces of grham cracker coated with pudding, prolonged mastication d/t endentulous status, complete oral clearance, no overt s/s aspiration, rec upgrade to soft bite sized solids    Pt will utilize safe swallow strategies with 90% acc in order to reduce risk of aspiration and s/s dysphagia.  Pt will complete oral and/or pharyngeal ROM and strengthening exercises in order to improve swallowing skills with least restrictive diet   Status: No progress made   Progress this date: pt not cooperative    Pt to participate in assessment of oropharyngeal swallow function via MBSS for assessment of possible aspiration and to determine least restrictive diet to meet nutritional and hydration needs.    Status: No progress made   Progress this date: pt refusing and is not appropriate, cont to monitor  if indicated  and if pt appropriate     Treatment/Education:  Results and recommendations were relayed to: Patient and Bedside nurse  Education provided: Yes   Learner: Patient   Barriers to learning: Cognitive limitations barrier   Method of teaching: Verbal   Topic: Role of ST, results of assessment, recommended diet modifications, recommendation for MBSS, recommended safe swallow  strategies, and recommendation for dysphagia follow-up   Outcome of teaching: Unable to demonstrate understanding at this time

## 2024-05-15 NOTE — PROGRESS NOTES
Sara Santiago is a 73 y.o. female on day 1 of admission presenting with Clogged feeding tube.      Subjective   Creatinine fairly unchanged at 2.1, patient is unable to provide any history due to altered mental status.       Objective          Vitals 24HR  Heart Rate:  [72-80]   Temp:  [36.5 °C (97.7 °F)-37 °C (98.6 °F)]   Resp:  [18]   BP: (110-136)/(50-56)   SpO2:  [96 %-98 %]       Intake/Output last 3 Shifts:    Intake/Output Summary (Last 24 hours) at 5/15/2024 1153  Last data filed at 5/15/2024 0530  Gross per 24 hour   Intake 1855.84 ml   Output 1000 ml   Net 855.84 ml       Physical Exam  Constitutional:       General: She is awake. She is not in acute distress.     Comments: Confused   Cardiovascular:      Rate and Rhythm: Regular rhythm.      Heart sounds:      No friction rub.   Pulmonary:      Effort: No respiratory distress.   Abdominal:      General: Bowel sounds are normal.      Palpations: Abdomen is soft.      Tenderness: There is no guarding or rebound.   Musculoskeletal:      Right lower leg: No edema.      Left lower leg: No edema.         Relevant Results  Results for orders placed or performed during the hospital encounter of 05/12/24 (from the past 24 hour(s))   POCT GLUCOSE   Result Value Ref Range    POCT Glucose 191 (H) 74 - 99 mg/dL   POCT GLUCOSE   Result Value Ref Range    POCT Glucose 79 74 - 99 mg/dL   Blood Culture    Specimen: Peripheral Venipuncture; Blood culture   Result Value Ref Range    Blood Culture Loaded on Instrument - Culture in progress    Blood Culture    Specimen: Peripheral Venipuncture; Blood culture   Result Value Ref Range    Blood Culture Loaded on Instrument - Culture in progress    POCT GLUCOSE   Result Value Ref Range    POCT Glucose 143 (H) 74 - 99 mg/dL   CBC   Result Value Ref Range    WBC 4.1 (L) 4.4 - 11.3 x10*3/uL    nRBC 0.0 0.0 - 0.0 /100 WBCs    RBC 3.43 (L) 4.00 - 5.20 x10*6/uL    Hemoglobin 10.3 (L) 12.0 - 16.0 g/dL    Hematocrit 30.6 (L) 36.0 - 46.0  %    MCV 89 80 - 100 fL    MCH 30.0 26.0 - 34.0 pg    MCHC 33.7 32.0 - 36.0 g/dL    RDW 15.9 (H) 11.5 - 14.5 %    Platelets 76 (L) 150 - 450 x10*3/uL   Comprehensive Metabolic Panel   Result Value Ref Range    Glucose 171 (H) 65 - 99 mg/dL    Sodium 141 133 - 145 mmol/L    Potassium 3.3 (L) 3.4 - 5.1 mmol/L    Chloride 109 (H) 97 - 107 mmol/L    Bicarbonate 18 (L) 24 - 31 mmol/L    Urea Nitrogen 37 (H) 8 - 25 mg/dL    Creatinine 2.10 (H) 0.40 - 1.60 mg/dL    eGFR 24 (L) >60 mL/min/1.73m*2    Calcium 9.0 8.5 - 10.4 mg/dL    Albumin 2.4 (L) 3.5 - 5.0 g/dL    Alkaline Phosphatase 150 (H) 35 - 125 U/L    Total Protein 7.9 5.9 - 7.9 g/dL    AST 22 5 - 40 U/L    Bilirubin, Total 1.1 0.1 - 1.2 mg/dL    ALT 10 5 - 40 U/L    Anion Gap 14 <=19 mmol/L            Assessment/Plan   1.  Acute kidney injury secondary to volume depletion from poor p.o. intake  2. Chronic kidney disease stage III  - baseline Cr 1.3-1.4  3. History of renal cell carcinoma status post partial nephrectomy  4. Pancytopenia  5. Diabetes mellitus     Plan: Continue IV fluids D5 half-normal saline with 75 mEq of sodium bicarb and 20 mEq of potassium. Give 20 mill equivalents of p.o. potassium and check a magnesium level.  Pending renal ultrasound.  Renally dose medications for GFR.   Recommend hematology consultation for pancytopenia.  Recommend dosing gabapentin 300 mg twice daily for GFR.    Shruti Dobbs MD

## 2024-05-15 NOTE — CONSULTS
Reason For Consult  Wound dehiscence    History Of Present Illness  Sara Santiago is a 73 y.o. female with a medical history significant for spinal fusion in March '24 at [facility] with a washout in April '24. She presented to [place] for a clogged NGT. We have been asked to see this patient for wound dehiscence of the spinal incision. Patient currently does not have NGT or PEG. Confused and unable to participate in exam     Past Medical History  She has a past medical history of Abnormal findings on diagnostic imaging of other abdominal regions, including retroperitoneum, Abnormal findings on diagnostic imaging of other specified body structures, Candidiasis of skin and nail (09/03/2019), Cellulitis of left lower limb (08/16/2021), Cellulitis of left toe (06/30/2021), Cellulitis of right lower limb (03/08/2021), Central retinal vein occlusion, left eye, stable (CMS-HCC) (03/21/2018), Cramp and spasm (08/16/2021), Encounter for immunization (03/21/2018), Encounter for immunization (03/21/2018), Other conditions influencing health status (03/21/2018), Other injury of unspecified body region, initial encounter (12/15/2017), Other injury of unspecified body region, initial encounter (01/10/2019), Other visual disturbances (03/21/2018), Personal history of diseases of the skin and subcutaneous tissue (11/24/2020), Personal history of other diseases of the respiratory system (07/05/2019), Personal history of other medical treatment, Personal history of other specified conditions (06/03/2020), Personal history of other specified conditions (07/07/2020), Personal history of other specified conditions (09/06/2019), Personal history of other specified conditions (07/22/2020), Unspecified fall, initial encounter (06/04/2018), and Vitamin D deficiency, unspecified (06/04/2018).    Surgical History  She has a past surgical history that includes Other surgical history (05/10/2022); Other surgical history (01/27/2022); and CT angio neck  "(10/24/2023).     Social History  She reports that she has quit smoking. Her smoking use included cigarettes. She has never used smokeless tobacco. She reports that she does not currently use drugs after having used the following drugs: Marijuana. She reports that she does not drink alcohol.    Family History  Family History   Problem Relation Name Age of Onset    Diabetes Other multiple Family Members         Allergies  Other and Sulfa (sulfonamide antibiotics)    Review of Systems  RENETTA due to altered mental status     Physical Exam  Physical Exam  Vitals and nursing note reviewed.   Constitutional:       Appearance: Normal appearance.   HENT:      Head: Normocephalic and atraumatic.      Mouth/Throat:      Mouth: Mucous membranes are moist.      Pharynx: Oropharynx is clear.   Eyes:      Extraocular Movements: Extraocular movements intact.      Pupils: Pupils are equal, round, and reactive to light.   Cardiovascular:      Rate and Rhythm: Normal rate and regular rhythm.      Pulses: Normal pulses.   Pulmonary:      Effort: Pulmonary effort is normal.      Breath sounds: Normal breath sounds.   Abdominal:      General: There is no distension.      Palpations: Abdomen is soft.      Tenderness: There is no abdominal tenderness.   Musculoskeletal:      Cervical back: Normal range of motion and neck supple.   Skin:     General: Skin is warm and dry.      Comments: Patchy erythema on her back and gluteus. Spinal incision with <cm skin dehiscence. Sutures seen at multiple points. Overlying white slough. No purulence or fluctuance.   Neurological:      Mental Status: She is alert. She is disoriented.           Last Recorded Vitals  Blood pressure 110/54, pulse 80, temperature 37 °C (98.6 °F), temperature source Oral, resp. rate 18, height 1.575 m (5' 2\"), weight 68.8 kg (151 lb 10.8 oz), SpO2 98%.       Assessment/Plan     Patient reportedly admitted from snf for clogged feeding tube, no current NGT. [facility] planned for " tomorrow. Spinal incision with a small amount of skin separation. ABX per ID recs. No drainable collection. Transfer to operating hospital system already started. No acute intervention necessary. Cont local wound care.      Allison Quinn MD

## 2024-05-16 VITALS
BODY MASS INDEX: 27.91 KG/M2 | OXYGEN SATURATION: 98 % | SYSTOLIC BLOOD PRESSURE: 122 MMHG | HEIGHT: 62 IN | DIASTOLIC BLOOD PRESSURE: 58 MMHG | HEART RATE: 84 BPM | WEIGHT: 151.68 LBS | TEMPERATURE: 97.9 F | RESPIRATION RATE: 18 BRPM

## 2024-05-16 LAB
ALBUMIN SERPL-MCNC: 2.3 G/DL (ref 3.5–5)
ALP BLD-CCNC: 150 U/L (ref 35–125)
ALT SERPL-CCNC: 9 U/L (ref 5–40)
ANION GAP SERPL CALC-SCNC: 10 MMOL/L
AST SERPL-CCNC: 22 U/L (ref 5–40)
BILIRUB SERPL-MCNC: 0.3 MG/DL (ref 0.1–1.2)
BUN SERPL-MCNC: 38 MG/DL (ref 8–25)
CALCIUM SERPL-MCNC: 8.8 MG/DL (ref 8.5–10.4)
CHLORIDE SERPL-SCNC: 112 MMOL/L (ref 97–107)
CO2 SERPL-SCNC: 19 MMOL/L (ref 24–31)
CREAT SERPL-MCNC: 2.2 MG/DL (ref 0.4–1.6)
EGFRCR SERPLBLD CKD-EPI 2021: 23 ML/MIN/1.73M*2
ERYTHROCYTE [DISTWIDTH] IN BLOOD BY AUTOMATED COUNT: 17.1 % (ref 11.5–14.5)
GLUCOSE BLD MANUAL STRIP-MCNC: 176 MG/DL (ref 74–99)
GLUCOSE BLD MANUAL STRIP-MCNC: 196 MG/DL (ref 74–99)
GLUCOSE BLD MANUAL STRIP-MCNC: 242 MG/DL (ref 74–99)
GLUCOSE SERPL-MCNC: 171 MG/DL (ref 65–99)
HCT VFR BLD AUTO: 37.4 % (ref 36–46)
HGB BLD-MCNC: 11.4 G/DL (ref 12–16)
MCH RBC QN AUTO: 29.6 PG (ref 26–34)
MCHC RBC AUTO-ENTMCNC: 30.5 G/DL (ref 32–36)
MCV RBC AUTO: 97 FL (ref 80–100)
NRBC BLD-RTO: 0 /100 WBCS (ref 0–0)
PLATELET # BLD AUTO: 79 X10*3/UL (ref 150–450)
POTASSIUM SERPL-SCNC: 3.4 MMOL/L (ref 3.4–5.1)
PROT SERPL-MCNC: 7.6 G/DL (ref 5.9–7.9)
RBC # BLD AUTO: 3.85 X10*6/UL (ref 4–5.2)
SODIUM SERPL-SCNC: 141 MMOL/L (ref 133–145)
WBC # BLD AUTO: 3.9 X10*3/UL (ref 4.4–11.3)

## 2024-05-16 PROCEDURE — 84075 ASSAY ALKALINE PHOSPHATASE: CPT | Performed by: INTERNAL MEDICINE

## 2024-05-16 PROCEDURE — 2500000004 HC RX 250 GENERAL PHARMACY W/ HCPCS (ALT 636 FOR OP/ED): Performed by: INTERNAL MEDICINE

## 2024-05-16 PROCEDURE — 92526 ORAL FUNCTION THERAPY: CPT | Mod: GN | Performed by: SPEECH-LANGUAGE PATHOLOGIST

## 2024-05-16 PROCEDURE — 85027 COMPLETE CBC AUTOMATED: CPT | Performed by: INTERNAL MEDICINE

## 2024-05-16 PROCEDURE — 94664 DEMO&/EVAL PT USE INHALER: CPT

## 2024-05-16 PROCEDURE — 82947 ASSAY GLUCOSE BLOOD QUANT: CPT

## 2024-05-16 PROCEDURE — 94640 AIRWAY INHALATION TREATMENT: CPT

## 2024-05-16 PROCEDURE — 80053 COMPREHEN METABOLIC PANEL: CPT | Performed by: INTERNAL MEDICINE

## 2024-05-16 PROCEDURE — 2500000001 HC RX 250 WO HCPCS SELF ADMINISTERED DRUGS (ALT 637 FOR MEDICARE OP): Performed by: INTERNAL MEDICINE

## 2024-05-16 PROCEDURE — 9420000001 HC RT PATIENT EDUCATION 5 MIN

## 2024-05-16 RX ADMIN — DAPTOMYCIN IN SODIUM CHLORIDE 500 MG: 500 INJECTION, SOLUTION INTRAVENOUS at 17:12

## 2024-05-16 RX ADMIN — SODIUM CHLORIDE 500 ML: 900 INJECTION, SOLUTION INTRAVENOUS at 01:01

## 2024-05-16 RX ADMIN — INSULIN LISPRO 2 UNITS: 100 INJECTION, SOLUTION INTRAVENOUS; SUBCUTANEOUS at 17:12

## 2024-05-16 RX ADMIN — METHOCARBAMOL TABLETS 750 MG: 750 TABLET, COATED ORAL at 20:12

## 2024-05-16 RX ADMIN — HEPARIN SODIUM 5000 UNITS: 5000 INJECTION, SOLUTION INTRAVENOUS; SUBCUTANEOUS at 06:37

## 2024-05-16 RX ADMIN — HEPARIN SODIUM 5000 UNITS: 5000 INJECTION, SOLUTION INTRAVENOUS; SUBCUTANEOUS at 20:12

## 2024-05-16 RX ADMIN — CEFEPIME 2 G: 2 INJECTION, POWDER, FOR SOLUTION INTRAVENOUS at 09:29

## 2024-05-16 RX ADMIN — FLUTICASONE FUROATE AND VILANTEROL TRIFENATATE 1 PUFF: 100; 25 POWDER RESPIRATORY (INHALATION) at 08:06

## 2024-05-16 RX ADMIN — MIRTAZAPINE 7.5 MG: 15 TABLET, FILM COATED ORAL at 20:12

## 2024-05-16 RX ADMIN — INSULIN LISPRO 4 UNITS: 100 INJECTION, SOLUTION INTRAVENOUS; SUBCUTANEOUS at 12:26

## 2024-05-16 RX ADMIN — LATANOPROST 1 DROP: 50 SOLUTION OPHTHALMIC at 20:17

## 2024-05-16 RX ADMIN — GABAPENTIN 300 MG: 300 CAPSULE ORAL at 20:13

## 2024-05-16 RX ADMIN — METHOCARBAMOL TABLETS 750 MG: 750 TABLET, COATED ORAL at 14:05

## 2024-05-16 RX ADMIN — HEPARIN SODIUM 5000 UNITS: 5000 INJECTION, SOLUTION INTRAVENOUS; SUBCUTANEOUS at 14:05

## 2024-05-16 ASSESSMENT — COGNITIVE AND FUNCTIONAL STATUS - GENERAL
PERSONAL GROOMING: TOTAL
TURNING FROM BACK TO SIDE WHILE IN FLAT BAD: TOTAL
WALKING IN HOSPITAL ROOM: TOTAL
DRESSING REGULAR UPPER BODY CLOTHING: TOTAL
DAILY ACTIVITIY SCORE: 6
EATING MEALS: TOTAL
HELP NEEDED FOR BATHING: TOTAL
DRESSING REGULAR LOWER BODY CLOTHING: TOTAL
WALKING IN HOSPITAL ROOM: TOTAL
STANDING UP FROM CHAIR USING ARMS: TOTAL
HELP NEEDED FOR BATHING: TOTAL
STANDING UP FROM CHAIR USING ARMS: TOTAL
DAILY ACTIVITIY SCORE: 6
TURNING FROM BACK TO SIDE WHILE IN FLAT BAD: TOTAL
CLIMB 3 TO 5 STEPS WITH RAILING: TOTAL
MOBILITY SCORE: 6
TOILETING: TOTAL
TOILETING: TOTAL
MOVING FROM LYING ON BACK TO SITTING ON SIDE OF FLAT BED WITH BEDRAILS: TOTAL
CLIMB 3 TO 5 STEPS WITH RAILING: TOTAL
EATING MEALS: TOTAL
DRESSING REGULAR UPPER BODY CLOTHING: TOTAL
MOVING TO AND FROM BED TO CHAIR: TOTAL
MOVING TO AND FROM BED TO CHAIR: TOTAL
DRESSING REGULAR LOWER BODY CLOTHING: TOTAL
PERSONAL GROOMING: TOTAL

## 2024-05-16 ASSESSMENT — PAIN SCALES - PAIN ASSESSMENT IN ADVANCED DEMENTIA (PAINAD)
BREATHING: NORMAL
TOTALSCORE: 0
BODYLANGUAGE: RELAXED
FACIALEXPRESSION: SMILING OR INEXPRESSIVE
CONSOLABILITY: NO NEED TO CONSOLE

## 2024-05-16 ASSESSMENT — PAIN SCALES - GENERAL
PAINLEVEL_OUTOF10: 0 - NO PAIN
PAINLEVEL_OUTOF10: 0 - NO PAIN

## 2024-05-16 ASSESSMENT — PAIN - FUNCTIONAL ASSESSMENT: PAIN_FUNCTIONAL_ASSESSMENT: 0-10

## 2024-05-16 ASSESSMENT — PAIN SCALES - WONG BAKER: WONGBAKER_NUMERICALRESPONSE: NO HURT

## 2024-05-16 NOTE — DOCUMENTATION CLARIFICATION NOTE
"    PATIENT:               RAMA THOMAS  ACCT #:                  [id number]  MRN:                       [id number]  :                       1950  ADMIT DATE:       2024 10:29 PM  DISCH DATE:  RESPONDING PROVIDER #:        [id number]          PROVIDER RESPONSE TEXT:    Metabolic Encephalopathy 2/2 altered mental status    CDI QUERY TEXT:    Clarification    Instruction:    Based on your assessment of the patient and the clinical information, please provide the requested documentation by clicking on the appropriate radio button and enter any additional information if prompted.    Question: Please clarify if Encephalopathy is a valid diagnosis for this patient    When answering this query, please exercise your independent professional judgment. The fact that a question is being asked, does not imply that any particular answer is desired or expected.    The patient's clinical indicators include:  Clinical Information: 73y.o. F presents from nursing home w/clogged Dobhoff feeding tube. Per H&P, admitted with Dysphagia, Lumbar Osteomyelitis & THAD with CKD.  Pt has advanced directive of DNR CC.    VS: 36.8, 87, 16, 136/60 Map 85, 94% RA     (H&P): \" The patient is confused and does not verbalize if she has any pain or discomfort. \"     (H&P): \" Opioid use disorder on methadone. Patient has chronic pain also. Has been on methadone for several years \"     (Consult): \" She is awake, confused, cooperative, restrained. \"     (Consult): \" The patient is encephalopathic unable to provide any history but her daughter was in the room by the bedside helping to provide history. \"    5/15 (Progress Note): \" Creatinine fairly unchanged at 2.1, patient is unable to provide any history due to altered mental status. \"    5/15 (Speech Therapy): \" NGT has been removed, pt refusing MBS, pt confused, slightly agitated, in bilateral restraints and does not appear to comprehend this procedure and reason for exam " "\"    Clinical Indicators:  5/14 (ABG): pH 7.32, pCO2 30, pO2 91, Lactate 1.1, HCO3 15.5    5/13 Creatinine 1.70, GFR 32, BUN 40, Bicarb 17, K 5.0, Albumin 2.6, Alkaline 178  5/14 Creatinine 2.00, GFR 26, BUN 40, Bicarb 16, K 3.4, Albumin 2.2, Alkaline 136  5/15 Creatinine 2.10, GFR 24, BUN 37, Bicarb 18, K 3.3, Albumin 2.4, Alkaline 150    Treatment: (EPIC MAR Info)  5/13-5/15 NS x2L  5/13-5/14 D5W x1L  5/14-5/15 D5NS with KCl 20mEq & NaHCO3 75mEq x1L    Risk Factors: PMHx: Dysphagia requiring NG tube feedings, DM2 with Neuropathy, COPD, Chronic pain, HTN, CKD, Hypothyroidism, Depression, Kidney cancer s/p resection, Vit D deficiency, former smoker.  BMI: 27.74  Options provided:  -- Metabolic Encephalopathy 2/2 altered mental status  -- Toxic Encephalopathy 2/2 Opioid use disorder evident by prescribed Methadone usage  -- Encephalopathy not present, nor a valid diagnosis for this patient  -- Other comments, please provide details here  -- Other - I will add my own diagnosis  -- Refer to Clinical Documentation Reviewer    Query created by: Sofya Colon on 5/15/2024 4:46 PM      Electronically signed by:  MATTEO MONK MD 5/16/2024 10:03 AM          "

## 2024-05-16 NOTE — DOCUMENTATION CLARIFICATION NOTE
"    PATIENT:               RAMA THOMAS  ACCT #:                  [id number]  MRN:                       [id number]  :                       1950  ADMIT DATE:       2024 10:29 PM  DISCH DATE:  RESPONDING PROVIDER #:        [id number]          PROVIDER RESPONSE TEXT:    Sacral pressure ulcer, stage 1, present on admission    CDI QUERY TEXT:    Clarification    Instruction:    Based on your assessment of the patient and the clinical information, please provide the requested documentation by clicking on the appropriate radio button and enter any additional information if prompted.    Question: Please further clarify the pressure injury site, stage/depth    When answering this query, please exercise your independent professional judgment. The fact that a question is being asked, does not imply that any particular answer is desired or expected.    The patient's clinical indicators include:  Clinical Information: 73y.o. F presents from nursing home w/clogged Dobhoff feeding tube. Per H&P, admitted with Dysphagia, Lumbar Osteomyelitis & THAD with CKD.  Pt has advanced directive of DNR CC.   VS: 36.8, 87, 16, 136/60 Map 85, 94% RA     (H&P): \" The patient is confused and does not verbalize if she has any pain or discomfort. SKIN: No rashes, no ulcers \"     (H&P): \" Opioid use disorder on methadone. Patient has chronic pain also. Has been on methadone for several years \"     (ID Consult): \" She has sacral pressure injury with non-blanchable erythema. \"     (Dietician Consult): \" multiple wounds/stage 3 pressure injury to sacrum \"     (Nursing Flowsheet): Site assessment: Red, painful Sacrum. Rosi-Wound assessment: Non-blanchable erythema, painful. Stage 3. Dressing: Foam, Silicone border dressing.    Clinical Indicators:   Urine Culture: Enterococcus Faecium  -5/15 (David Scale as documented by nursing staff):  RBC 3.55, WBC 5.4, Plts 82   RBC 3.29, WBC 3.6, Plts 67  5/15 " RBC 3.43, WBC 4.1, Plts 76    5/13 Creatinine 1.70, GFR 32, BUN 40, Bicarb 17, K 5.0, Albumin 2.6, Alkaline 178  5/14 Creatinine 2.00, GFR 26, BUN 40, Bicarb 16, K 3.4, Albumin 2.2, Alkaline 136  5/15 Creatinine 2.10, GFR 24, BUN 37, Bicarb 18, K 3.3, Albumin 2.4, Alkaline 150    Treatment: (EPIC MAR Info)  5/13-5/15 NS x2L  5/13-5/14 D5W x1L  5/14-5/15 D5NS with KCl 20mEq & NaHCO3 75mEq x1L    Risk Factors: Dysphagia requiring NG tube feedings, DM2 with Neuropathy, COPD, Chronic pain, HTN, CKD, Hypothyroidism, Depression, Kidney cancer s/p resection, Vit D deficiency, former smoker.  BMI: 27.74  Options provided:  -- Sacral pressure ulcer, stage 1, present on admission  -- Sacral pressure ulcer, stage 2, present on admission  -- Sacral pressure ulcer, stage 3, present on admission  -- Sacral pressure ulcer, stage 4, present on admission  -- Other comments, please provide details here  -- Other - I will add my own diagnosis  -- Refer to Clinical Documentation Reviewer    Query created by: Sofya Colon on 5/15/2024 4:46 PM      Electronically signed by:  MATTEO MNOK MD 5/16/2024 10:03 AM

## 2024-05-16 NOTE — PROGRESS NOTES
Sara Santiago is a 73 y.o. female on day 1 of admission presenting with Clogged feeding tube.      Subjective   HPI: This is a 73 y.o. female who was sent to the ER for a clogged NG tube.       74 y/o woman pmhx sig for T2DM last A1c 6.7, COPD, hx renal Ca s/p partial nephrectomy, hx breast Ca s/p b/l implants, recent metro admission 3/14-4/1/24 to [facility] after fall from chair w/ L2 b/l pedicle fracture and underwent T11-L4 reduction and fusion 3/22. Discharged to SNF 4/1,      Patient has had the NG tube the last few weeks as her swallowing was very poor following the surgery to her back.  In the last week patient has been eating better per daughter who was at bedside.  Speech therapy had seen the patient discussed with the speech therapist will discontinue the NG tube and wait for a MBS to be done tomorrow and determine the need for the NGT after that.  Patient is a very poor historian unable to offer any history     ED HPI : 73-year-old female with complicated past medical history including dysphagia requiring NG tube feedings, type 2 diabetes, COPD, CKD, hypothyroidism, depression, kidney cancer status post resection and advanced directive of DNR CC is brought to the emergency department from her nursing home at [facility] with a chief complaint of clogged feeding tube. The patient has a Dobbhoff feeding tube that has not been flushing or returning of fluid for a day. The patient is confused and does not verbalize if she has any pain or discomfort.    Objective     Last Recorded Vitals  /50   Pulse 72   Temp 36.3 °C (97.3 °F) (Oral)   Resp 14   Wt 68.8 kg (151 lb 10.8 oz)   SpO2 97%   Intake/Output last 3 Shifts:    Intake/Output Summary (Last 24 hours) at 5/15/2024 2250  Last data filed at 5/15/2024 0530  Gross per 24 hour   Intake 906.67 ml   Output 1000 ml   Net -93.33 ml       Admission Weight  Weight: 83.5 kg (184 lb) (05/12/24 2235)    Daily Weight  05/13/24 : 68.8 kg (151 lb 10.8  oz)    Image Results  US renal complete  Narrative: Interpreted By:  Jamey Knox,   STUDY:  US RENAL COMPLETE; 5/15/2024 1:22 pm      INDICATION:  Signs/Symptoms:THAD.      COMPARISON:  None.      ACCESSION NUMBER(S):  [id number]      ORDERING CLINICIAN:  GAEL MCDONALD      TECHNIQUE:  Grayscale and color Doppler imaging of the kidneys.      FINDINGS:  The right kidney measures 8.1 x 5.0 cm no hydronephrosis or  nephrolithiasis demonstrated. Normal cortical thickness The left  kidney measures 10.1 x 4.3 cm. There is normal cortical thickness. No  hydronephrosis or nephrolithiasis.              .      Impression: 1. No hydronephrosis demonstrated      MACRO:  None.      Signed by: Jamey Knox 5/15/2024 2:00 PM  Dictation workstation:   [id number]      Physical Exam  GENERAL: awake,  Ox2, confused   SKIN: Skin turgor normal. No rashes  HEENT: no epistaxis, Moist mucosa.  NECK: supple  BACK: spine nontender to palpation, No CVAT.  LUNGS: Vesicular breath sounds, with no wheeze, no crepitations.   CARDIAC: REGULAR. S1 and S2; no rubs, no murmur  ABDOMEN: Abdomen soft, non-tender. BS+  EXTREMITIES: No edema, Good capillary refill.   NEURO: Insight GOOD. Motor and sensory exam wnl. No invol movements. No ataxia.  WOUND: Per wound care  MUSCULOSKELETAL: No acute inflammation    Relevant Results  Results for orders placed or performed during the hospital encounter of 05/12/24 (from the past 24 hour(s))   CBC   Result Value Ref Range    WBC 4.1 (L) 4.4 - 11.3 x10*3/uL    nRBC 0.0 0.0 - 0.0 /100 WBCs    RBC 3.43 (L) 4.00 - 5.20 x10*6/uL    Hemoglobin 10.3 (L) 12.0 - 16.0 g/dL    Hematocrit 30.6 (L) 36.0 - 46.0 %    MCV 89 80 - 100 fL    MCH 30.0 26.0 - 34.0 pg    MCHC 33.7 32.0 - 36.0 g/dL    RDW 15.9 (H) 11.5 - 14.5 %    Platelets 76 (L) 150 - 450 x10*3/uL   Comprehensive Metabolic Panel   Result Value Ref Range    Glucose 171 (H) 65 - 99 mg/dL    Sodium 141 133 - 145 mmol/L    Potassium 3.3 (L) 3.4 - 5.1 mmol/L     Chloride 109 (H) 97 - 107 mmol/L    Bicarbonate 18 (L) 24 - 31 mmol/L    Urea Nitrogen 37 (H) 8 - 25 mg/dL    Creatinine 2.10 (H) 0.40 - 1.60 mg/dL    eGFR 24 (L) >60 mL/min/1.73m*2    Calcium 9.0 8.5 - 10.4 mg/dL    Albumin 2.4 (L) 3.5 - 5.0 g/dL    Alkaline Phosphatase 150 (H) 35 - 125 U/L    Total Protein 7.9 5.9 - 7.9 g/dL    AST 22 5 - 40 U/L    Bilirubin, Total 1.1 0.1 - 1.2 mg/dL    ALT 10 5 - 40 U/L    Anion Gap 14 <=19 mmol/L   Magnesium   Result Value Ref Range    Magnesium 1.70 1.60 - 3.10 mg/dL   POCT GLUCOSE   Result Value Ref Range    POCT Glucose 252 (H) 74 - 99 mg/dL   POCT GLUCOSE   Result Value Ref Range    POCT Glucose 104 (H) 74 - 99 mg/dL   POCT GLUCOSE   Result Value Ref Range    POCT Glucose 109 (H) 74 - 99 mg/dL     Scheduled medications  cefepime, 2 g, intravenous, q24h  [START ON 5/16/2024] daptomycin, 500 mg, intravenous, q48h  fluticasone furoate-vilanteroL, 1 puff, inhalation, Daily  gabapentin, 300 mg, oral, BID  heparin (porcine), 5,000 Units, subcutaneous, q8h  insulin lispro, 0-10 Units, subcutaneous, TID  latanoprost, 1 drop, Both Eyes, Nightly  loratadine, 10 mg, oral, Daily  methadone, 40 mg, oral, q12h  methocarbamol, 750 mg, oral, q6h during day  mirtazapine, 7.5 mg, oral, Nightly  sennosides, 1 tablet, oral, Daily      Continuous medications  dextrose 5%-0.45 % sodium chloride 1,000 mL with potassium chloride 20 mEq, sodium bicarbonate 75 mEq infusion, 80 mL/hr, Last Rate: 80 mL/hr (05/15/24 0552)      PRN medications  PRN medications: dextrose, dextrose, glucagon, glucagon, ipratropium-albuteroL, morphine, ondansetron, simethicone            Assessment/Plan        # Dysphagia  -MBS tomorrow  -May need NG to be placed     # 1. S/p enterobacter surgical site infection 4/12/2024  _L2 pedicle fracture, s/p T11-L4 fusion  _ Lumbar osteomyelitis   -On IV antibiotics will continue the same     #DM2 / neuropathy  -Insulin sliding scale  -Patient is n.p.o. and hence cannot get the  gabapentin     # Opoid use disorder on methadone   -Patient has chronic pain also  -Has been on methadone for several years     # Hypertension  -IV hydralazine as needed     # CKD/THAD  -IV fluids       5/14-wound care input noted, patient has lumbar spine incision dehiscence, seen by surgery, recommend transfer to [facility] [place], operated by [facility] where the surgery was initially done.  Called daughter to obtain consent, unable to reach her and left a message will try again later.  Patient has been eating well, she has not been cooperative for MBS assessment by speech, but per RN and staff patient has been safe to eat.  May not need NG tube replaced, will plan to transfer to [facility] [place], operated by [facility] when I obtain consent for the same from the daughter.    5/15-discussed with [facility] [place] physician yesterday, patient has been accepted for transfer to [facility] [place], operated by [facility], patient is able to eat well, continue with IV antibiotics as planned, transfer to [facility] [place], operated by [facility] when bed available.    Simon Delgado MD

## 2024-05-16 NOTE — PROGRESS NOTES
Speech-Language Pathology    Speech-Language Pathology Clinical Swallow Treatment    Patient Name: Sara Santiago  MRN: [id number]  : 1950  Today's Date: 24  Start time: Start Time: 915  Stop time: Stop Time: 930  Time calculation (min) : Time Calculation (min): 15 min    ASSESSMENT  SLP TX Intervention Outcome: Making Progress Towards Goals  Treatment Tolerance: Patient tolerated treatment well    Impressions: Pt safely tolerating solids, puree and thin liquids without s/s aspiration or suspected pharyngeal stage dysphgia. Do not feel MBSS is clinically indicated, discontinued, RN and MD notified    PLAN  Recommended solid consistency: Easy to chew  Recommended liquid consistency: Thin (IDDSI 0)  Recommended medication administration: Whole, Crushed, in puree  Safe swallow strategies: Upright positioning for all PO intake, Slow rate of intake, Small bites, and Small sips  Discharge recommendation: Recommend MODERATE intensity ST upon DC in order to ensure safety with least restrictive diet.  Inpatient/Swing Bed or Outpatient: Inpatient  SLP TX Plan: Continue Plan of Care  SLP Plan: Skilled SLP  SLP Frequency: 3x per week     Next Treatment Priority: diet katarzyna, strat  Discussed POC: Patient, Caregiver/family, Nursing  Patient/Caregiver Agreeable: Yes  Next Treatment Priority: diet katarzyna, strat    SUBJECTIVE  Prior to Session Communication: Bedside nurse  RN cleared pt to participate in session and reported pt is awaiting transfer to [facility], when bed available. Pt is tolerating recommended diet without difficulty  Respiratory status: Room air  Positioning: Upright in bed  Pt was alert, cooperative, and pleasantly confused for session.    Pain Assessment  Pain Assessment: 0-10  Pain Score: 0 - No pain       Orientation: Oriented to self  Ability to follow functional commands: WFL    OBJECTIVE  Therapeutic Swallow  Therapeutic Swallow Intervention : PO Trials, Compensatory Strategies    Dysphagia  Goals: (Established 5/13/24, projected discontinuation 2 weeks)     Pt will safely swallow recommended diet without s/s aspiration for 90% of observed trials in order to maintain adequate nutrition and hydration.              Status: Progressing             Progress this date: pt safely consumed 4 oz thin liquids, 2 oz puree, and 1 dinora cracker,  and thin liquids without s/s aspiration, improved intake today,33   Pt will safely swallow advanced consistency solids without s/s oral dysphagia or aspiration for 90% of trials.              Status: Progressing              Progress this date: adequate mastication despite endentulous status, complete oral clearance, no overt s/s aspiration, rec upgrade to easy to chew solids    3.Pt will utilize safe swallow strategies with 90% acc in order to reduce risk of aspiration and s/s dysphagia.   Status: Progressing   Progress this date: Pt fed self, min v/c to utilize safe swallow strategies with 90% acc    4. Pt will complete oral and/or pharyngeal ROM and strengthening exercises in order to improve swallowing skills with least restrictive diet              Status: not progressing              Progress this date: pt declined    5. Pt to participate in assessment of oropharyngeal swallow function via MBSS for assessment of possible aspiration and to determine least restrictive diet to meet nutritional and hydration needs.               Status: MBSS discontinued, RN and MD notified              Progress this date: pt is safely tolerating solids, puree and thin liquids without s/s aspiration or pharyngeal stage dysphagia, this SLP does not feel MBSS is clinically indicated, RN and MD notified     Treatment/Education:  Results and recommendations were relayed to: Patient and Bedside nurse  Education provided: Yes   Learner: Patient   Barriers to learning: Cognitive limitations barrier   Method of teaching: Verbal   Topic: Role of ST, results of assessment, recommended diet  modifications, recommended safe swallow strategies, and recommendation for dysphagia follow-up   Outcome of teaching: Unable to demonstrate understanding at this time

## 2024-05-16 NOTE — CARE PLAN
The patient's goals for the shift include  rest    The clinical goals for the shift include pt will remain hemodynamically stable throughout the shift    Over the shift, the patient did  make progress toward the following goals.   Problem: Pain  Goal: My pain/discomfort is manageable  Outcome: Progressing     Problem: Safety  Goal: Patient will be injury free during hospitalization  Outcome: Progressing     Problem: Daily Care  Goal: Daily care needs are met  Outcome: Progressing     Problem: Safety  Goal: I will remain free of falls  Outcome: Progressing     Problem: Psychosocial Needs  Goal: Demonstrates ability to cope with hospitalization/illness  Outcome: Progressing     Problem: Pain  Goal: Takes deep breaths with improved pain control throughout the shift  Outcome: Progressing     Problem: Fall/Injury  Goal: Not fall by end of shift  Outcome: Progressing     Problem: Fall/Injury  Goal: Be free from injury by end of the shift  Outcome: Progressing

## 2024-05-16 NOTE — PROGRESS NOTES
05/16/24 1133   Discharge Planning   Patient expects to be discharged to: Transfer to [facility] when bed opens up

## 2024-05-16 NOTE — DOCUMENTATION CLARIFICATION NOTE
"    PATIENT:               RAMA THOMAS  ACCT #:                  [id number]  MRN:                       [id number]  :                       1950  ADMIT DATE:       2024 10:29 PM  DISCH DATE:  RESPONDING PROVIDER #:        [id number]          PROVIDER RESPONSE TEXT:    Acute kidney injury without acute tubular necrosis    CDI QUERY TEXT:    Clarification    Instruction:    Based on your assessment of the patient and the clinical information, please provide the requested documentation by clicking on the appropriate radio button and enter any additional information if prompted.    Question: Please further clarify the diagnosis of acute kidney injury as    When answering this query, please exercise your independent professional judgment. The fact that a question is being asked, does not imply that any particular answer is desired or expected.    The patient's clinical indicators include:  Clinical Information: 73y.o. F presents from nursing home w/clogged Dobhoff feeding tube. Per H&P, admitted with Dysphagia, Lumbar Osteomyelitis & THAD with CKD.  Pt has advanced directive of DNR CC.    VS: 36.8, 87, 16, 136/60 Map 85, 94% RA     (H&P): \" CKD/THAD, IV fluids \"     (Nephrology Consult): \" Her creatinine is 2.0.  Baseline creatinine 1.3-1.4.  We are consulted for management of acute kidney injury on chronic kidney disease stage III. Continue IV fluids however switch to D5 half-normal saline with 75 mEq of sodium bicarb and 20 mEq of potassium. \"    5/15 (Progress Note): \" Creatinine fairly unchanged at 2.1, patient is unable to provide any history due to altered mental status. \"    5/15 (Progress Note): \" Acute kidney injury secondary to volume depletion from poor p.o. intake. Chronic kidney disease stage III, baseline Cr 1.3-1.4. History of renal cell carcinoma status post partial nephrectomy \"    Clinical Indicators:   Urine Culture: Enterococcus Faecium     (Urinalysis): Urine pH 6.0, Urine " Protein +2, Specific Gravity 1.018, Urine Squamous Epithelial Cells 1-9 (sparse)    5/13 Creatinine 1.70, GFR 32, BUN 40, Bicarb 17, K 5.0, Albumin 2.6, Alkaline 178  5/14 Creatinine 2.00, GFR 26, BUN 40, Bicarb 16, K 3.4, Albumin 2.2, Alkaline 136  5/15 Creatinine 2.10, GFR 24, BUN 37, Bicarb 18, K 3.3, Albumin 2.4, Alkaline 150    Treatment: (EPIC MAR Info)  5/13-5/15 NS x2L  5/13-5/14 D5W x1L  5/14-5/15 D5NS with KCl 20mEq & NaHCO3 75mEq x1L    Risk Factors: PMHx: Dysphagia requiring NG tube feedings, DM2 with Neuropathy, COPD, Chronic pain, HTN, CKD, Hypothyroidism, Depression, Kidney cancer s/p resection, Vit D deficiency, former smoker.  BMI: 27.74  Options provided:  -- Acute kidney injury with acute tubular necrosis  -- Acute kidney injury without acute tubular necrosis  -- Other comments, please provide details here  -- Other - I will add my own diagnosis  -- Refer to Clinical Documentation Reviewer    Query created by: Sofya Colon on 5/15/2024 4:46 PM      Electronically signed by:  MATTEO MONK MD 5/16/2024 10:03 AM

## 2024-05-16 NOTE — PROGRESS NOTES
Sara Santiago is a 73 y.o. female on day 2 of admission presenting with Clogged feeding tube.    Subjective   Interval History:   Afebrile  Awake, confused    Review of Systems   Unable to perform ROS: Mental status change       Objective   Range of Vitals (last 24 hours)  Heart Rate:  [72-82]   Temp:  [36.3 °C (97.3 °F)-36.6 °C (97.9 °F)]   Resp:  [14-18]   BP: (103-138)/(37-60)   SpO2:  [95 %-97 %]   Daily Weight  05/13/24 : 68.8 kg (151 lb 10.8 oz)    Body mass index is 27.74 kg/m².    Physical Exam  Constitutional:       Appearance: Normal appearance.   HENT:      Head: Normocephalic.      Nose: Nose normal.      Mouth/Throat:      Pharynx: Oropharynx is clear.   Eyes:      Conjunctiva/sclera: Conjunctivae normal.   Cardiovascular:      Rate and Rhythm: Normal rate.   Pulmonary:      Effort: Pulmonary effort is normal.      Breath sounds: Normal breath sounds.   Abdominal:      General: Bowel sounds are normal.      Palpations: Abdomen is soft.   Musculoskeletal:         General: Normal range of motion.      Cervical back: Normal range of motion.   Skin:     General: Skin is warm and dry.   Neurological:      Mental Status: She is alert. She is disoriented.           Antibiotics  sodium chloride 0.9% infusion  morphine injection 4 mg  ondansetron (Zofran) injection 4 mg  glucagon (Glucagen) injection 1 mg  dextrose 50 % injection 25 g  glucagon (Glucagen) injection 1 mg  dextrose 50 % injection 12.5 g  insulin lispro (HumaLOG) injection 0-10 Units  ipratropium-albuteroL (Duo-Neb) 0.5-2.5 mg/3 mL nebulizer solution 3 mL  hydrALAZINE (Apresoline) injection 10 mg  heparin (porcine) injection 5,000 Units  HYDROmorphone (Dilaudid) injection 1 mg  cefepime (Maxipime) IV 2 g  ipratropium-albuteroL (Duo-Neb) 0.5-2.5 mg/3 mL nebulizer solution 3 mL        cetirizine (ZyrTEC) tablet  docusate sodium (Colace) capsule    fluconazole (Diflucan) tablet    gabapentin (Neurontin) capsule        methocarbamol (Robaxin)  tablet  mirtazapine (Remeron) tablet  ondansetron (Zofran-ODT) disintegrating tablet    simethicone (Mylicon) chewable tablet  dextrose 5 % in water (D5W) infusion  cefepime (Maxipime) IV 2 g  dextrose 5 % and sodium chloride 0.45 % with KCl 20 mEq/L infusion  dextrose 5%-0.45 % sodium chloride 1,000 mL with potassium chloride 20 mEq, sodium bicarbonate 75 mEq infusion  methadone (Dolophine) tablet 40 mg  DAPTOmycin (Cubicin) 8 mg in sodium chloride 0.9% 50 mL IV  gabapentin (Neurontin) capsule 400 mg  latanoprost (Xalatan) 0.005 % ophthalmic solution 1 drop  methocarbamol (Robaxin) tablet 750 mg  mirtazapine (Remeron) tablet 7.5 mg  sennosides (Senokot) tablet 8.6 mg  simethicone (Mylicon) chewable tablet 80 mg  loratadine (Claritin) tablet 10 mg  fluticasone furoate-vilanteroL (Breo Ellipta) 100-25 mcg/dose inhaler 1 puff  DAPTOmycin (Cubicin) 500 mg/50 mL  mg  gabapentin (Neurontin) capsule 300 mg  potassium chloride (Klor-Con) packet 20 mEq  DAPTOmycin (Cubicin) 500 mg/50 mL  mg    sodium chloride 0.9 % bolus 500 mL      Relevant Results  Labs  Results from last 72 hours   Lab Units 05/16/24  0641 05/15/24  0610 05/14/24  0502   WBC AUTO x10*3/uL 3.9* 4.1* 3.6*   HEMOGLOBIN g/dL 11.4* 10.3* 9.8*   HEMATOCRIT % 37.4 30.6* 31.2*   PLATELETS AUTO x10*3/uL 79* 76* 67*     Results from last 72 hours   Lab Units 05/16/24  0643 05/15/24  0610 05/14/24  0502   SODIUM mmol/L 141 141 139   POTASSIUM mmol/L 3.4 3.3* 3.4   CHLORIDE mmol/L 112* 109* 110*   CO2 mmol/L 19* 18* 16*   BUN mg/dL 38* 37* 40*   CREATININE mg/dL 2.20* 2.10* 2.00*   GLUCOSE mg/dL 171* 171* 175*   CALCIUM mg/dL 8.8 9.0 8.7   ANION GAP mmol/L 10 14 13   EGFR mL/min/1.73m*2 23* 24* 26*     Results from last 72 hours   Lab Units 05/16/24  0643 05/15/24  0610 05/14/24  0502   ALK PHOS U/L 150* 150* 136*   BILIRUBIN TOTAL mg/dL 0.3 1.1 0.3   PROTEIN TOTAL g/dL 7.6 7.9 7.3   ALT U/L 9 10 10   AST U/L 22 22 20   ALBUMIN g/dL 2.3* 2.4* 2.2*      Estimated Creatinine Clearance: 20.7 mL/min (A) (by C-G formula based on SCr of 2.2 mg/dL (H)).  CRP   Date Value Ref Range Status   06/24/2022 0.97 mg/dL Final     Comment:     REF VALUE  < 1.00     03/14/2022 1.04 (A) mg/dL Final     Comment:     REF VALUE  < 1.00     06/02/2020 1.58 (A) mg/dL Final     Comment:     REF VALUE  < 1.00       Microbiology  Susceptibility data from last 14 days.  Collected Specimen Info Organism Ampicillin Ciprofloxacin Daptomycin Levofloxacin Linezolid Nitrofurantoin Penicillin Trimethoprim/Sulfamethoxazole Vancomycin   05/13/24 Urine from Indwelling ([place]) Catheter Enterococcus faecium  R  R  SDD  R  S  S  R  R  R     Imaging  US renal complete    Result Date: 5/15/2024  Interpreted By:  Jamey Knox, STUDY: US RENAL COMPLETE; 5/15/2024 1:22 pm   INDICATION: Signs/Symptoms:THAD.   COMPARISON: None.   ACCESSION NUMBER(S): [id number]   ORDERING CLINICIAN: GAEL MCDONALD   TECHNIQUE: Grayscale and color Doppler imaging of the kidneys.   FINDINGS: The right kidney measures 8.1 x 5.0 cm no hydronephrosis or nephrolithiasis demonstrated. Normal cortical thickness The left kidney measures 10.1 x 4.3 cm. There is normal cortical thickness. No hydronephrosis or nephrolithiasis.       .       1. No hydronephrosis demonstrated   MACRO: None.   Signed by: Jamey Knox 5/15/2024 2:00 PM Dictation workstation:   [id number]    XA PICC W/O PORT INC IMG >6Y/O    Result Date: 4/24/2024  EXAMINATION: XA PICC W/O PORT INC IMG >6Y/O 04/19/2024 05:42 PM CLINICAL HISTORY: Rad Procedure required: = PICC placement,long term abx ASSOCIATED DIAGNOSIS: ORDERING PROVIDER: JONNY BLACKBURN TECHNOLOGISTS NOTE:  Right arm 29cm single picc FLUOROSCOPIST:  VÍCTOR PETER TIME: .2 Minutes ATTENDING PHYSICIAN: Leonel Sifuentes RESIDENT/FELLOW PHYSICIAN: Víctor Peter INFORMED CONSENT: Written informed consent was obtained. The procedure, risks, benefits, and alternatives were discussed. All questions were answered.  TIMEOUT: Physician led timeout was conducted documenting correct patient, procedure, site, fire risk, antibiotics and allergies. COMPLICATIONS: None ESTIMATED BLOOD LOSS: Less than 10 mL TECHNIQUE: The patient was positioned supine on the angiography table. The right arm was prepped and draped in usual aseptic fashion. Lidocaine was used for local anesthesia. A 21 ga. micro puncture needle was used to access the right basilic vein, using realtime ultrasound guidance. An ultrasound spot image was obtained. A 0.018 inch access wire was passed through the needle and the needle removed. A peel-away introducer sheath was placed over the wire over its matched dilator and the dilator was removed.  A chlorhexidine gluconate-coated catheter was trimmed to an appropriate length and passed through the peel-away sheath. The peel-away was removed. A fluoroscopic spot image was obtained. The catheter was aspirated and flushed with normal saline. The catheter was sutured in place. Sterile dressings were placed. The patient tolerated the procedure well without immediate complication and was transferred from the angiography suite in stable condition. Catheter type: Single lumen 4.5 Azeri Final catheter length: 29 cm FINDINGS: Ultrasound spot image demonstrates patency of the target vein. Fluoroscopic spot image show the tip of the catheter projecting over the region of the atriocaval junction. IMPRESSION: Technically successful uncomplicated PICC line placement. MACRO: None    XR ABDOMEN AP 1 VIEW    Result Date: 4/23/2024  EXAMINATION: XR ABDOMEN AP 1 VIEW 04/23/2024 03:40 AM CLINICAL HISTORY: Step 2 of Corpak 2 Step process ASSOCIATED DIAGNOSIS: ORDERING PROVIDER: SHYANN WORRELL TECHNOLOGISTS NOTE: COMPARISON: XR ABDOMEN AP 1 VIEW 4/17/2024, 10:25 AM FINDINGS: IMPRESSION: 1.  Corpak tube projects over the left upper quadrant with metallic weighted tip directed inferiorly, suitable for use. 2.  Nonspecific bowel gas pattern. 3.   Partial visualization of thoracolumbar posterior fusion construct without evidence for complication. MACRO: None    XR chest 1 view    Result Date: 4/23/2024  EXAMINATION: XR CHEST AP OR PA 1 VIEW 04/23/2024 03:13 AM CLINICAL HISTORY: Feeding tube assessment ASSOCIATED DIAGNOSIS: Feeding tube assessment ORDERING PROVIDER: SHYANN WORRELL TECHNOLOGISTS NOTE: COMPARISON: XR CHEST AP OR PA 1 VIEW 4/16/2024, 1:25 PM FINDINGS: Position and projection: AP erect. Limitations: None. Clinical considerations: Obtained from EMR. Lines, tubes, and devices: Enteral feeding tube terminates overlying the region of the mid to distal thoracic esophagus, below the level of the baljeet. Right internal jugular central venous catheter terminates overlying superior right atrial region. Right upper extremity PICC line terminates overlying the proximal superior vena cava region. Cardiomediastinal silhouette: Normal heart size. Aorta: Atherosclerotic calcification. Diaphragm: Elevation of the right hemidiaphragm with the right hemidiaphragm approximately 4 cm higher than the left. Lungs and pleura: Medial basilar left lower lobe atelectasis/consolidation. No sizable pleural effusion or pneumothorax. Osseous structures: Lower thoracic and lumbar posterior spinal fixation hardware. IMPRESSION: 1.  Enteral feeding tube terminates overlying the region of the mid to distal thoracic esophagus, below the level of the baljeet. This should be advanced into the duodenum for optimal position. 2.  Right internal jugular central venous catheter terminates overlying superior right atrial region. This can be retracted 3 cm for more optimal position. 3.  Right upper extremity PICC line terminates overlying the proximal superior vena cava region. This can be advanced 5 cm for more optimal position. 4.  Medial basilar left lower lobe atelectasis/consolidation. 5.  Elevation of the right hemidiaphragm. 6.  Status post lower thoracic and lumbar posterior spinal  fixation hardware. MACRO: None    CT angio chest w and wo IV contrast    Result Date: 4/19/2024  EXAMINATION: CTA CHEST PULMONARY EMBOLISM W/ 04/19/2024 03:29 PM CLINICAL HISTORY: Bedridden at least 3 days or major surgery within 4 weeks; Hypoxia; Pulmonary embolism suspected; S/p surgery (w/ general anesthesia) or trauma within 4 weeks; Tachycardia (HR more than 100 bpm) ASSOCIATED DIAGNOSIS: Bedridden at least 3 days or major surgery within 4 weeks Hypoxia Pulmonary embolism suspected S/p surgery (w/ general anesthesia) or trauma within 4 weeks Tachycardia (HR more than 100 bpm) [facility]ING [facility]: COMPARISON: CT CHEST/ABD/PELVIS W/ CONTRAST 3/14/2024, 7:49 PM CT CHEST/ABD/PELVIS W/ CONTRAST 10/24/2023, 10:58 AM TECHNIQUE: Axial images of the chest were obtained from above the lung apices through the level of the adrenal glands during the bolus administration of intravenous contrast. Multiplanar and 3D maximum intensity projection reformulation's were created from the raw CT data which were interpreted in conjunction with the axial images to render the findings listed below. Before infusion of intravenous contrast, radiology personnel investigated the possibility of an allergic history and of any history of reaction to iodinated contrast material. Contrast Protocol: Omnipaque 350 [>or =100lb] 100 ml [<100 lb] 1 ml per 1 lb. INTRA-PROCEDURE MEDS: iohexol (OMNIPAQUE) 350 MG/ML injection 100 mL Route: Intravenous Push FINDINGS: Exam Quality: Overall exam quality is satisfactory. Pulmonary arterial enhancement is adequate, the breath hold is adequate, and there are no significant artifacts impacting image quality. Pulmonary Arteries: There are no filling defects within the pulmonary arterial system to suggest pulmonary embolus. Cardiovasculature: The heart is enlarged in size. Atherosclerotic calcifications are present within the coronary arteries. Mediastinum/Pericardium:  Unremarkable. Thyroid: Stable hypodense nodules. Pleura: Small left pleural effusion. Trace right pleural effusion. Lungs: Debris within the lower trachea extending to the left mainstem bronchus and occluding the right lower lobe bronchial tree, with associated post obstructive collapse of the left lower lobe. There are new patchy groundglass opacities, primarily in the upper and mid lungs, and right greater than left. Nodules: No nodules are present that require follow up. Lymph Nodes: No thoracic lymphadenopathy is evident. Visualized musculoskeletal structures: No acute fracture or destructive osseous lesion is identified. Included images of the upper abdomen: Please see the separately dictated CT of the abdomen. IMPRESSION: 1.  Negative for acute pulmonary embolus.   2.  Postobstructive collapse of the left lower lobe secondary to occlusive debris in the bronchial tree. Superimposed infectious process cannot be excluded. Small left pleural effusion. 3.  New patchy opacities in the bilateral lungs, right greater than left, and predominantly involving the upper and mid lungs, nonspecific but suggestive of atypical infectious process.   MACRO: None    CT thoracic spine wo IV contrast    Result Date: 4/19/2024  EXAMINATION: CT T-SPINE W/O CONTRASTPRO/PRO   04/19/2024 03:29 PM CLINICAL HISTORY: Tachycardia (HR more than 100 bpm) ASSOCIATED DIAGNOSIS: Tachycardia (HR more than 100 bpm) ORDERING PROVIDER: LORENZO JI TECHNOLOGISTS NOTE: COMPARISON: None TECHNIQUE: Thin axial images were obtained through the thoracic region without intravenous contrast. 2D sagittal and coronal reconstructions were obtained from the axial data. FINDINGS: Fixation hardware is partially visible from T11 extending inferiorly. The visible portion of the hardware is intact without signs of lucency. Please see the separately dictated CT of the lumbar spine. Thoracic vertebral body height is intact. No fracture or aggressive osseous lesion.  Moderate degenerative changes are visible lower cervical spine. Please see the separately dictated CT of the chest for additional findings. IMPRESSION: No acute fracture or malalignment of the thoracic spine. MACRO: None    CT abdomen pelvis w IV contrast    Result Date: 4/19/2024  EXAMINATION: CT ABDOMEN/PELVIS W/ CONTRAST 04/19/2024 03:29 PM CLINICAL HISTORY: Tachycardia (HR more than 100 bpm) ASSOCIATED DIAGNOSIS: Tachycardia (HR more than 100 bpm) ORDERING PROVIDER: LORENZO JI TECHNOLOGISTS NOTE: COMPARISON: CT CHEST/ABD/PELVIS W/ CONTRAST 3/14/2024, 7:49 PM CT ABD/PELVIS ED I/V LUIS W/ 10/16/2018, 12:01 AM XR T-SPINE/L-SPINE JNCT ONLY 2 VIEWS 3/27/2024, 2:23 PM TECHNIQUE: Contiguous axial images were obtained through the abdomen and pelvis from the level of the diaphragmatic domes through the pubic symphysis following bolus administration of intravenous contrast. MPR sagittal and coronal reconstructions were obtained from the axial data. Before infusion of intravenous contrast, radiology personnel investigated the possibility of an allergic history and of any history of reaction to iodinated contrast material. Contrast Protocol: Omnipaque 350 [>or =100lb] 100 ml [<100 lb] 1 ml per 1 lb. INTRA-PROCEDURE MEDS: iohexol (OMNIPAQUE) 350 MG/ML injection 100 mL Route: Intravenous Push FINDINGS: Suboptimal study due to streak artifact from spinal hardware and enteric tube distal tip. Included images of the lower thorax: Please see the separately dictated CT of the chest. Hepatobiliary: Mild intrahepatic and extrahepatic biliary dilatation with the common bile duct measures up to 8 mm in diameter, grossly unchanged dating back to 2018. Pancreas: Unremarkable Spleen: The spleen is enlarged measuring 14 cm. Stable hypodensity at the inferior aspect, likely a cyst or hemangioma. Adrenal Glands: Stable left adrenal nodule, incompletely characterized on this study but previously described as an adenoma. Kidneys,  ureters, and bladder: No calculi or hydroureteronephrosis. Stable left renal hypodensities, too small to characterize but likely reflective of cysts. Tiny air bubble within the urinary bladder may relate to recent instrumentation. Abdominal and pelvic vasculature: Atherosclerotic wall calcifications are present without abdominal aortic aneurysm. GI tract: No evidence of obstruction. Peritoneum and retroperitoneum: No free fluid or free air. Lymph Nodes: No abdominal or pelvic lymphadenopathy. Uterus and adnexa: Not well evaluated by CT. Visualized musculoskeletal structures: No acute fracture or destructive osseous lesion is identified. Posterior fusion T11 L4 without evidence of hardware failure. Stable fat-containing umbilical hernia and fat-containing left inguinal hernia. Stable sheetlike dystrophic calcifications in the superficial subcutaneous tissues of the bilateral gluteal regions and the lower abdominal wall. IMPRESSION: No acute process. Stable chronic ancillary findings as above. MACRO: None    CT lumbar spine wo IV contrast    Result Date: 4/19/2024  EXAMINATION: CT L-SPINE W/O CONTRASTPRO/PRO   04/19/2024 03:29 PM CLINICAL HISTORY: Tachycardia (HR more than 100 bpm) ASSOCIATED DIAGNOSIS: Tachycardia (HR more than 100 bpm) ORDERING PROVIDER: LORENZO JI TECHNOLOGISTS NOTE: COMPARISON: CT T-SPINE/L-SPINE W/O CONTRAST 3/14/2024, 7:49 PM TECHNIQUE: Thin axial images were obtained through the lumbar region without intravenous contrast. 2D sagittal and coronal reconstructions were obtained from the axial data. FINDINGS: There is posterior fusion hardware extending from T11 through L4. From at least T12-L3 there is patchy demineralization at lucency and erosion of the vertebral bodies, with relatively prominent involvement of the endplate inferiorly at L2.. No perihardware lucency. Hardware is intact. Grade 3 spondylolytic L5-S1 spondylolisthesis is again noted. There is solid osseous fusion across the  L5-S1 disc space. The bowel L2 pedicle fractures are noted. There is left lower lobe collapse. Trace bilateral pleural effusions. IMPRESSION: No new fracture. Patchy demineralization of the upper lumbar spine with prominent endplate involvement, raise the possibility of osteomyelitis/discitis. Further evaluation with MRI should be considered although imaging be limited by artifact from hardware. Nuclear medicine bone scan could also be considered. MACRO: None    FL modified barium swallow study    Result Date: 4/18/2024  EXAMINATION: FL MODIFIED BARIUM SWALLOW 04/18/2024 02:02 PM CLINICAL HISTORY: dysphagia ASSOCIATED DIAGNOSIS: ORDERING PROVIDER: VIMAL LEWIS TECHNOLOGISTS NOTE: COMPARISON: None FLUOROSCOPIST:  MELANIE HUGHES    FLUORO TIME: 3.3 Minutes TECHNIQUE: The examination was performed in conjunction with the Dysphagia/Speech Pathology Team. Various consistencies of contrast (thin barium, nectar thickened barium and pureed consistency of barium) were administered and deglutition was evaluated in the lateral projection utilizing video fluoroscopy. INTRA-PROCEDURE MEDS: barium Sulfate 40 % 5 oz Route: Oral barium Sulfate 40 % 5 oz Route: Oral Barium Sulfate 60 % 5 g Route: Oral FINDINGS: Oral phase: Normal. Pharyngeal phase: Delayed initiation of the pharyngeal phase of swallowing with the head of the contrast column at the level of the valleculae. Elevation of the larynx and epiglottic inversion: Demonstrated. Laryngeal penetration, tracheal aspiration or nasopharyngeal reflux: None. Significant residual pooling of contrast within the valleculae or pyriform: Trace residuals of contrast within the valleculae and piriform sinuses. Ancillary findings: Enteric feeding tube. Partially visualized of a right internal jugular central venous catheter. A complete report will also be performed by the Dysphagia/Speech Pathology Team. IMPRESSION: 1.  No laryngeal penetration or tracheal aspiration. 2.  Delayed  initiation of the pharyngeal phase of swallowing with the head of the contrast column at the level of the valleculae. 3.  Enteric feeding tube. MACRO: None    XR ABDOMEN AP 1 VIEW    Result Date: 4/17/2024  EXAMINATION: XR ABDOMEN AP 1 VIEW 04/17/2024 10:23 AM CLINICAL HISTORY: bilious vomiting. Tube feeds ASSOCIATED DIAGNOSIS: ORDERING PROVIDER: VIMAL LEWIS TECHNOLOGISTS NOTE: COMPARISON: XR ABDOMEN AP 1 VIEW 4/16/2024, 1:25 PM IMPRESSION: Corpak distal tip overlies the expected position of the distal stomach. Nonspecific, nonobstructive bowel gas pattern. MACRO: None     Assessment/Plan   E. Faecium Urinary tract infection-susceptibility pending  Recent thoracolumbar post op wound infection  -culture grew pseudomonas, Citrobacter, Proteus Mirabilis, Enterococcus faecalis-IV cefepime till 5/28/2024.  Acute kidney injury on chronic kidney disease stage III  Opioid use disorder on methadone        IV cefepime-coverage for Enterobacter  IV daptomycin-cover E faecium  Monitor temperature and WBC  Follow up pending cultures   Monitor renal function  General surgery following  Local care  Supportive care         Jeffrey Gomez MD

## 2024-05-16 NOTE — PROGRESS NOTES
----- Message from Arlette Quezada, [address] sent at 11/21/2022  5:05 PM EST -----   Ppa and HPV Result is negative, please call patient to inform     Thanks Sara Santiago is a 73 y.o. female on day 2 of admission presenting with Clogged feeding tube.      Subjective   Creatinine fairly unchanged, remains above baseline, patient encephalopathic unable to provide any history.  She is nonoliguric.       Objective          Vitals 24HR  Heart Rate:  [72-88]   Temp:  [36.3 °C (97.3 °F)-37 °C (98.6 °F)]   Resp:  [14-18]   BP: (103-138)/(37-68)   SpO2:  [95 %-99 %]       Intake/Output last 3 Shifts:    Intake/Output Summary (Last 24 hours) at 5/16/2024 1245  Last data filed at 5/16/2024 0606  Gross per 24 hour   Intake 1611.99 ml   Output 1200 ml   Net 411.99 ml       Physical Exam  Constitutional:       General: She is awake. She is not in acute distress.     Comments: Confused   Cardiovascular:      Rate and Rhythm: Regular rhythm.      Heart sounds:      No friction rub.   Pulmonary:      Effort: No respiratory distress.   Abdominal:      General: Bowel sounds are normal.      Palpations: Abdomen is soft.      Tenderness: There is no guarding or rebound.   Musculoskeletal:      Right lower leg: No edema.      Left lower leg: No edema.     Relevant Results  Results for orders placed or performed during the hospital encounter of 05/12/24 (from the past 24 hour(s))   POCT GLUCOSE   Result Value Ref Range    POCT Glucose 104 (H) 74 - 99 mg/dL   POCT GLUCOSE   Result Value Ref Range    POCT Glucose 109 (H) 74 - 99 mg/dL   CBC   Result Value Ref Range    WBC 3.9 (L) 4.4 - 11.3 x10*3/uL    nRBC 0.0 0.0 - 0.0 /100 WBCs    RBC 3.85 (L) 4.00 - 5.20 x10*6/uL    Hemoglobin 11.4 (L) 12.0 - 16.0 g/dL    Hematocrit 37.4 36.0 - 46.0 %    MCV 97 80 - 100 fL    MCH 29.6 26.0 - 34.0 pg    MCHC 30.5 (L) 32.0 - 36.0 g/dL    RDW 17.1 (H) 11.5 - 14.5 %    Platelets 79 (L) 150 - 450 x10*3/uL   Comprehensive Metabolic Panel   Result Value Ref Range    Glucose 171 (H) 65 - 99 mg/dL    Sodium 141 133 - 145 mmol/L    Potassium 3.4 3.4 - 5.1 mmol/L    Chloride 112 (H) 97 - 107 mmol/L    Bicarbonate 19  (L) 24 - 31 mmol/L    Urea Nitrogen 38 (H) 8 - 25 mg/dL    Creatinine 2.20 (H) 0.40 - 1.60 mg/dL    eGFR 23 (L) >60 mL/min/1.73m*2    Calcium 8.8 8.5 - 10.4 mg/dL    Albumin 2.3 (L) 3.5 - 5.0 g/dL    Alkaline Phosphatase 150 (H) 35 - 125 U/L    Total Protein 7.6 5.9 - 7.9 g/dL    AST 22 5 - 40 U/L    Bilirubin, Total 0.3 0.1 - 1.2 mg/dL    ALT 9 5 - 40 U/L    Anion Gap 10 <=19 mmol/L   POCT GLUCOSE   Result Value Ref Range    POCT Glucose 176 (H) 74 - 99 mg/dL   POCT GLUCOSE   Result Value Ref Range    POCT Glucose 242 (H) 74 - 99 mg/dL            Assessment/Plan   1.  Acute kidney injury secondary to volume depletion from poor p.o. intake, likely has acute tubular necrosis  2. Chronic kidney disease stage III  - baseline Cr 1.3-1.4  3. History of renal cell carcinoma status post partial nephrectomy  4. Pancytopenia  5. Diabetes mellitus     Plan: Continue IV fluids D5 half-normal saline with 75 mEq of sodium bicarb and 20 mEq of potassium. Renally dose medications for GFR.   Recommend hematology consultation for pancytopenia.  Recommend dosing gabapentin 300 mg twice daily for GFR.    Shruti Dobbs MD

## 2024-05-16 NOTE — NURSING NOTE
Pt remains lethargic and drowsy.  Pt awakens with touch and verbal stimulation.  Pt's bp= 100/30-40's.  Dr. Delgado notified ns bolus ordered

## 2024-05-17 LAB — GLUCOSE BLD MANUAL STRIP-MCNC: 174 MG/DL (ref 74–99)

## 2024-05-17 NOTE — CARE PLAN
Problem: Pain  Goal: My pain/discomfort is manageable  Outcome: Progressing     Problem: Safety  Goal: Patient will be injury free during hospitalization  Outcome: Progressing  Goal: I will remain free of falls  Outcome: Progressing     Problem: Daily Care  Goal: Daily care needs are met  Outcome: Progressing     Problem: Psychosocial Needs  Goal: Demonstrates ability to cope with hospitalization/illness  Outcome: Progressing  Goal: Collaborate with me, my family, and caregiver to identify my specific goals  Outcome: Progressing     Problem: Discharge Barriers  Goal: My discharge needs are met  Outcome: Progressing     Problem: Pain  Goal: Takes deep breaths with improved pain control throughout the shift  Outcome: Progressing  Goal: Turns in bed with improved pain control throughout the shift  Outcome: Progressing  Goal: Walks with improved pain control throughout the shift  Outcome: Progressing  Goal: Performs ADL's with improved pain control throughout shift  Outcome: Progressing  Goal: Participates in PT with improved pain control throughout the shift  Outcome: Progressing  Goal: Free from opioid side effects throughout the shift  Outcome: Progressing  Goal: Free from acute confusion related to pain meds throughout the shift  Outcome: Progressing     Problem: Skin  Goal: Participates in plan/prevention/treatment measures  Recent Flowsheet Documentation  Taken 5/16/2024 2124 by Marion Leroy RN  Participates in plan/prevention/treatment measures:   Discuss with provider PT/OT consult   Elevate heels     Problem: Skin  Goal: Decreased wound size/increased tissue granulation at next dressing change  Outcome: Progressing  Flowsheets (Taken 5/15/2024 0150 by Radha Vasquez RN)  Decreased wound size/increased tissue granulation at next dressing change: Promote sleep for wound healing  Goal: Participates in plan/prevention/treatment measures  Outcome: Progressing  Flowsheets (Taken 5/16/2024 2124)  Participates in  plan/prevention/treatment measures:   Discuss with provider PT/OT consult   Elevate heels  Goal: Prevent/manage excess moisture  Outcome: Progressing  Goal: Prevent/minimize sheer/friction injuries  Outcome: Progressing  Goal: Promote/optimize nutrition  Outcome: Progressing  Goal: Promote skin healing  Outcome: Progressing     Problem: Diabetes  Goal: I will have no complications due to diabetes  Outcome: Progressing     Problem: Respiratory  Goal: No signs of respiratory distress (eg. Use of accessory muscles. Peds grunting)  Outcome: Progressing   The patient's goals for the shift include      The clinical goals for the shift include Safety, VSS

## 2024-05-18 LAB
BACTERIA BLD CULT: NORMAL
BACTERIA BLD CULT: NORMAL

## 2024-06-06 PROCEDURE — 99223 1ST HOSP IP/OBS HIGH 75: CPT | Performed by: EMERGENCY MEDICINE

## 2024-06-07 PROCEDURE — 99233 SBSQ HOSP IP/OBS HIGH 50: CPT | Performed by: EMERGENCY MEDICINE

## 2024-06-10 PROCEDURE — 99233 SBSQ HOSP IP/OBS HIGH 50: CPT | Performed by: EMERGENCY MEDICINE

## 2024-06-11 PROCEDURE — 99233 SBSQ HOSP IP/OBS HIGH 50: CPT | Performed by: EMERGENCY MEDICINE

## 2024-06-12 PROCEDURE — 99233 SBSQ HOSP IP/OBS HIGH 50: CPT | Performed by: EMERGENCY MEDICINE

## 2024-06-13 PROCEDURE — 99233 SBSQ HOSP IP/OBS HIGH 50: CPT | Performed by: EMERGENCY MEDICINE

## 2024-06-14 PROCEDURE — 99233 SBSQ HOSP IP/OBS HIGH 50: CPT | Performed by: EMERGENCY MEDICINE

## 2024-06-15 PROCEDURE — 99233 SBSQ HOSP IP/OBS HIGH 50: CPT | Performed by: EMERGENCY MEDICINE

## 2024-06-16 PROCEDURE — 99233 SBSQ HOSP IP/OBS HIGH 50: CPT | Performed by: EMERGENCY MEDICINE

## 2024-06-17 PROCEDURE — 99233 SBSQ HOSP IP/OBS HIGH 50: CPT | Performed by: EMERGENCY MEDICINE

## 2024-06-18 PROCEDURE — 99233 SBSQ HOSP IP/OBS HIGH 50: CPT | Performed by: EMERGENCY MEDICINE

## 2024-06-19 PROCEDURE — 99232 SBSQ HOSP IP/OBS MODERATE 35: CPT | Performed by: EMERGENCY MEDICINE

## 2024-06-20 PROCEDURE — 99232 SBSQ HOSP IP/OBS MODERATE 35: CPT | Performed by: EMERGENCY MEDICINE

## 2024-06-21 PROCEDURE — 99232 SBSQ HOSP IP/OBS MODERATE 35: CPT | Performed by: EMERGENCY MEDICINE

## 2024-06-24 PROCEDURE — 99232 SBSQ HOSP IP/OBS MODERATE 35: CPT | Performed by: EMERGENCY MEDICINE

## 2024-06-25 PROCEDURE — 99232 SBSQ HOSP IP/OBS MODERATE 35: CPT | Performed by: EMERGENCY MEDICINE

## 2024-06-26 PROCEDURE — 99232 SBSQ HOSP IP/OBS MODERATE 35: CPT | Performed by: EMERGENCY MEDICINE

## 2024-06-27 PROCEDURE — 99232 SBSQ HOSP IP/OBS MODERATE 35: CPT | Performed by: EMERGENCY MEDICINE

## 2024-06-27 NOTE — DISCHARGE SUMMARY
Discharge Diagnosis  Clogged feeding tube    Issues Requiring Follow-Up  Dysphagia    Discharge Meds     Your medication list        START taking these medications        Instructions Last Dose Given Next Dose Due   DAPTOmycin 500 mg/50 mL IV  Commonly known as: Cubicin      Infuse 50 mL (500 mg) at 100 mL/hr over 30 minutes into a venous catheter every other day for 183 doses. Do not fill before May 16, 2024.       gabapentin 400 mg capsule  Commonly known as: Neurontin           gabapentin 800 mg tablet  Commonly known as: Neurontin      Take 1 tablet (800 mg) by mouth 2 times a day.       Heartburn Relief (famotidine) 10 mg tablet  Generic drug: famotidine      TAKE 1 TABLET BY MOUTH EVERYDAY AT BEDTIME              CHANGE how you take these medications        Instructions Last Dose Given Next Dose Due   sertraline 100 mg tablet  Commonly known as: Zoloft  What changed: how much to take      Take 2 tablets (200 mg) by mouth once daily.              CONTINUE taking these medications        Instructions Last Dose Given Next Dose Due   acetaminophen 500 mg tablet  Commonly known as: Tylenol      Take 2 tablets (1,000 mg) by mouth every 6 hours.       aspirin 81 mg EC tablet      Take 1 tablet (81 mg) by mouth once daily.       atorvastatin 40 mg tablet  Commonly known as: Lipitor           Basaglar KwikPen U-100 Insulin 100 unit/mL (3 mL) pen  Generic drug: insulin glargine           bisacodyl 10 mg suppository  Commonly known as: Dulcolax           calcitriol 0.25 mcg capsule  Commonly known as: Rocaltrol      TAKE 1 CAPSULE BY MOUTH EVERY DAY       cefepime IV  Commonly known as: Maxipime           cetirizine 10 mg tablet  Commonly known as: ZyrTEC           cholecalciferol 25 MCG (1000 UT) tablet  Commonly known as: Vitamin D-3      Take 1 tablet (1,000 Units) by mouth once daily.       diaper,brief,adult,disposable misc      USE AS NEEDED       diclofenac sodium 1 % kit      apply to affected areas 2-3 times  "daily       Diff-Stat 471 mg capsule  Generic drug: Sacch boulardi-Bacill coag-FOS           docusate sodium 100 mg capsule  Commonly known as: Colace           eucerin cream  Commonly known as: Minerin Creme      APPLY TO BOTH LEGS TWICE A DAY       fluconazole 100 mg tablet  Commonly known as: Diflucan           fluticasone 110 mcg/actuation inhaler  Commonly known as: Flovent      Inhale 2 puffs 2 times a day. RINSE MOUTH AFTER USE.       fluticasone propion-salmeteroL 500-50 mcg/dose diskus inhaler  Commonly known as: Advair Diskus           insulin lispro 100 unit/mL injection  Commonly known as: HumaLOG           lancets 30 gauge misc           latanoprost 0.005 % ophthalmic solution  Commonly known as: Xalatan           levothyroxine 25 mcg tablet  Commonly known as: Synthroid, Levoxyl      Take 1 tablet (25 mcg) by mouth once every day.       lidocaine 4 % patch           lidocaine 4 % cream  Commonly known as: LMX      Apply topically 2 times a day as needed for mild pain (1 - 3).       loratadine 10 mg tablet  Commonly known as: Claritin      TAKE 1 TABLET BY MOUTH EVERY DAY AS NEEDED       methadone 10 mg/5 mL solution  Commonly known as: Dolophine           methocarbamol 750 mg tablet  Commonly known as: Robaxin           mirtazapine 7.5 mg tablet  Commonly known as: Remeron           multivitamin tablet      Take 1 tablet by mouth once daily.       ondansetron ODT 4 mg disintegrating tablet  Commonly known as: Zofran-ODT           pen needle 1/2\" 29G X 12mm needle      USE AS DIRECTED.       polyethylene glycol 17 gram/dose powder  Commonly known as: Miralax      Take 17 g by mouth once daily as needed (constipation).       sennosides 8.6 mg tablet  Commonly known as: Senokot           simethicone 80 mg chewable tablet  Commonly known as: Mylicon           traZODone 50 mg tablet  Commonly known as: Desyrel           True Metrix Glucose Test Strip strip  Generic drug: blood sugar diagnostic         "   white petrolatum 41 % ointment ointment  Commonly known as: Aquaphor      Apply 1 Application topically 3 times a day.                 Where to Get Your Medications        These [facility]  [id number] [facility] [id number]      Hours: 9 AM to 6 PM Mon-Fri, 9 AM to 1 PM Sat Phone: [phone]   DAPTOmycin 500 mg/50 mL IV         Test Results Pending At Discharge  Pending Labs       No current pending labs.            Hospital Course  HPI: This is a 73 y.o. female who was sent to the ER for a clogged NG tube.       74 y/o woman pmhx sig for T2DM last A1c 6.7, COPD, hx renal Ca s/p partial nephrectomy, hx breast Ca s/p b/l implants, recent admission 3/14-4/1/24 to [facility], [place], operated by [facility] after fall from chair w/ L2 b/l pedicle fracture and underwent T11-L4 reduction and fusion 3/22. Discharged to SNF 4/1,      Patient has had the NG tube the last few weeks as her swallowing was very poor following the surgery to her back.  In the last week patient has been eating better per daughter who was at bedside.  Speech therapy had seen the patient discussed with the speech therapist will discontinue the NG tube and wait for a MBS to be done tomorrow and determine the need for the NGT after that.  Patient is a very poor historian unable to offer any history     ED HPI : 73-year-old female with complicated past medical history including dysphagia requiring NG tube feedings, type 2 diabetes, COPD, CKD, hypothyroidism, depression, kidney cancer status post resection and advanced directive of DNR CC is brought to the emergency department from her nursing home at [facility] with a chief complaint of clogged feeding tube. The patient has a Dobbhoff feeding tube that has not been flushing or returning of fluid for a day. The patient is confused and does not verbalize if she has any pain or discomfort.       # Dysphagia  -MBS tomorrow  -May need NG to be placed     # 1. S/p enterobacter  surgical site infection 4/12/2024  _L2 pedicle fracture, s/p T11-L4 fusion  _ Lumbar osteomyelitis   -On IV antibiotics will continue the same     #DM2 / neuropathy  -Insulin sliding scale  -Patient is n.p.o. and hence cannot get the gabapentin     # Opoid use disorder on methadone   -Patient has chronic pain also  -Has been on methadone for several years     # Hypertension  -IV hydralazine as needed     # CKD/THAD  -IV fluids           5/14-wound care input noted, patient has lumbar spine incision dehiscence, seen by surgery, recommend transfer to [facility] where the surgery was initially done.  Called daughter to obtain consent, unable to reach her and left a message will try again later.  Patient has been eating well, she has not been cooperative for MBS assessment by speech, but per RN and staff patient has been safe to eat.  May not need NG tube replaced, will plan to transfer to [facility] when I obtain consent for the same from the daughter.     5/15-discussed with [facility] physician yesterday, patient has been accepted for transfer to [facility], patient is able to eat well, continue with IV antibiotics as planned, transfer to [facility] when bed available.       Simon Delgado MD

## 2024-06-28 PROCEDURE — 99232 SBSQ HOSP IP/OBS MODERATE 35: CPT | Performed by: EMERGENCY MEDICINE

## 2024-07-01 PROCEDURE — 99232 SBSQ HOSP IP/OBS MODERATE 35: CPT | Performed by: EMERGENCY MEDICINE

## 2024-07-02 PROCEDURE — 99239 HOSP IP/OBS DSCHRG MGMT >30: CPT | Performed by: EMERGENCY MEDICINE
